# Patient Record
Sex: FEMALE | Race: WHITE | Employment: OTHER | URBAN - METROPOLITAN AREA
[De-identification: names, ages, dates, MRNs, and addresses within clinical notes are randomized per-mention and may not be internally consistent; named-entity substitution may affect disease eponyms.]

---

## 2022-10-04 ENCOUNTER — APPOINTMENT (OUTPATIENT)
Dept: ULTRASOUND IMAGING | Age: 87
DRG: 418 | End: 2022-10-04
Payer: MEDICARE

## 2022-10-04 ENCOUNTER — HOSPITAL ENCOUNTER (INPATIENT)
Age: 87
LOS: 8 days | Discharge: NURSING, REHAB OR LONG-TERM CARE | DRG: 418 | End: 2022-10-12
Attending: INTERNAL MEDICINE | Admitting: INTERNAL MEDICINE
Payer: MEDICARE

## 2022-10-04 DIAGNOSIS — R74.01 TRANSAMINITIS: ICD-10-CM

## 2022-10-04 DIAGNOSIS — K81.0 ACUTE CHOLECYSTITIS: Primary | ICD-10-CM

## 2022-10-04 LAB
A/G RATIO: 1.3 (ref 1.1–2.2)
ALBUMIN SERPL-MCNC: 4 G/DL (ref 3.4–5)
ALP BLD-CCNC: 369 U/L (ref 40–129)
ALT SERPL-CCNC: 892 U/L (ref 10–40)
ANION GAP SERPL CALCULATED.3IONS-SCNC: 11 MMOL/L (ref 3–16)
APTT: 35.3 SEC (ref 23–34.3)
AST SERPL-CCNC: 462 U/L (ref 15–37)
BASOPHILS ABSOLUTE: 0 K/UL (ref 0–0.2)
BASOPHILS RELATIVE PERCENT: 0.4 %
BILIRUB SERPL-MCNC: 5.8 MG/DL (ref 0–1)
BUN BLDV-MCNC: 12 MG/DL (ref 7–20)
CALCIUM SERPL-MCNC: 10 MG/DL (ref 8.3–10.6)
CHLORIDE BLD-SCNC: 97 MMOL/L (ref 99–110)
CO2: 25 MMOL/L (ref 21–32)
CREAT SERPL-MCNC: <0.5 MG/DL (ref 0.6–1.2)
EOSINOPHILS ABSOLUTE: 0 K/UL (ref 0–0.6)
EOSINOPHILS RELATIVE PERCENT: 0.1 %
GFR AFRICAN AMERICAN: >60
GFR NON-AFRICAN AMERICAN: >60
GLUCOSE BLD-MCNC: 161 MG/DL (ref 70–99)
HCT VFR BLD CALC: 47.2 % (ref 36–48)
HEMOGLOBIN: 15.4 G/DL (ref 12–16)
INR BLD: 1.59 (ref 0.87–1.14)
LIPASE: 38 U/L (ref 13–60)
LYMPHOCYTES ABSOLUTE: 0.7 K/UL (ref 1–5.1)
LYMPHOCYTES RELATIVE PERCENT: 6.3 %
MCH RBC QN AUTO: 28.3 PG (ref 26–34)
MCHC RBC AUTO-ENTMCNC: 32.6 G/DL (ref 31–36)
MCV RBC AUTO: 86.8 FL (ref 80–100)
MONOCYTES ABSOLUTE: 0.8 K/UL (ref 0–1.3)
MONOCYTES RELATIVE PERCENT: 7.4 %
NEUTROPHILS ABSOLUTE: 9.8 K/UL (ref 1.7–7.7)
NEUTROPHILS RELATIVE PERCENT: 85.8 %
PDW BLD-RTO: 14.4 % (ref 12.4–15.4)
PLATELET # BLD: 224 K/UL (ref 135–450)
PMV BLD AUTO: 8.5 FL (ref 5–10.5)
POTASSIUM SERPL-SCNC: 4.5 MMOL/L (ref 3.5–5.1)
PROTHROMBIN TIME: 18.9 SEC (ref 11.7–14.5)
RBC # BLD: 5.44 M/UL (ref 4–5.2)
SODIUM BLD-SCNC: 133 MMOL/L (ref 136–145)
TOTAL PROTEIN: 7.2 G/DL (ref 6.4–8.2)
WBC # BLD: 11.4 K/UL (ref 4–11)

## 2022-10-04 PROCEDURE — 76705 ECHO EXAM OF ABDOMEN: CPT

## 2022-10-04 PROCEDURE — 93005 ELECTROCARDIOGRAM TRACING: CPT | Performed by: PHYSICIAN ASSISTANT

## 2022-10-04 PROCEDURE — 1200000000 HC SEMI PRIVATE

## 2022-10-04 PROCEDURE — 85730 THROMBOPLASTIN TIME PARTIAL: CPT

## 2022-10-04 PROCEDURE — 85610 PROTHROMBIN TIME: CPT

## 2022-10-04 PROCEDURE — 99285 EMERGENCY DEPT VISIT HI MDM: CPT

## 2022-10-04 PROCEDURE — 83690 ASSAY OF LIPASE: CPT

## 2022-10-04 PROCEDURE — 80053 COMPREHEN METABOLIC PANEL: CPT

## 2022-10-04 PROCEDURE — 85025 COMPLETE CBC W/AUTO DIFF WBC: CPT

## 2022-10-04 RX ORDER — 0.9 % SODIUM CHLORIDE 0.9 %
1000 INTRAVENOUS SOLUTION INTRAVENOUS ONCE
Status: COMPLETED | OUTPATIENT
Start: 2022-10-04 | End: 2022-10-05

## 2022-10-04 RX ORDER — AMLODIPINE BESYLATE 5 MG/1
10 TABLET ORAL DAILY
Status: CANCELLED | OUTPATIENT
Start: 2022-10-05

## 2022-10-04 RX ORDER — LISINOPRIL 5 MG/1
5 TABLET ORAL DAILY
COMMUNITY
End: 2022-10-05 | Stop reason: ALTCHOICE

## 2022-10-04 RX ORDER — LISINOPRIL 10 MG/1
5 TABLET ORAL DAILY
Status: CANCELLED | OUTPATIENT
Start: 2022-10-05

## 2022-10-04 RX ORDER — MORPHINE SULFATE 4 MG/ML
4 INJECTION, SOLUTION INTRAMUSCULAR; INTRAVENOUS EVERY 30 MIN PRN
Status: DISCONTINUED | OUTPATIENT
Start: 2022-10-04 | End: 2022-10-05

## 2022-10-04 RX ORDER — ONDANSETRON 2 MG/ML
4 INJECTION INTRAMUSCULAR; INTRAVENOUS EVERY 6 HOURS PRN
Status: DISCONTINUED | OUTPATIENT
Start: 2022-10-04 | End: 2022-10-05

## 2022-10-04 RX ORDER — AMLODIPINE BESYLATE 10 MG/1
10 TABLET ORAL DAILY
COMMUNITY
End: 2022-10-05 | Stop reason: ALTCHOICE

## 2022-10-04 ASSESSMENT — PAIN SCALES - GENERAL: PAINLEVEL_OUTOF10: 8

## 2022-10-04 ASSESSMENT — ENCOUNTER SYMPTOMS
ABDOMINAL DISTENTION: 1
SHORTNESS OF BREATH: 0
VOMITING: 0
COUGH: 0
NAUSEA: 1
DIARRHEA: 0
BACK PAIN: 1
RHINORRHEA: 0
ABDOMINAL PAIN: 0

## 2022-10-04 ASSESSMENT — PAIN - FUNCTIONAL ASSESSMENT: PAIN_FUNCTIONAL_ASSESSMENT: 0-10

## 2022-10-05 ENCOUNTER — APPOINTMENT (OUTPATIENT)
Dept: CT IMAGING | Age: 87
DRG: 418 | End: 2022-10-05
Payer: MEDICARE

## 2022-10-05 ENCOUNTER — APPOINTMENT (OUTPATIENT)
Dept: MRI IMAGING | Age: 87
DRG: 418 | End: 2022-10-05
Payer: MEDICARE

## 2022-10-05 LAB
ALBUMIN SERPL-MCNC: 3.2 G/DL (ref 3.4–5)
ALP BLD-CCNC: 315 U/L (ref 40–129)
ALT SERPL-CCNC: 642 U/L (ref 10–40)
ANION GAP SERPL CALCULATED.3IONS-SCNC: 12 MMOL/L (ref 3–16)
APTT: 34.8 SEC (ref 23–34.3)
AST SERPL-CCNC: 299 U/L (ref 15–37)
BACTERIA: ABNORMAL /HPF
BASOPHILS ABSOLUTE: 0 K/UL (ref 0–0.2)
BASOPHILS RELATIVE PERCENT: 0.4 %
BILIRUB SERPL-MCNC: 4.8 MG/DL (ref 0–1)
BILIRUBIN DIRECT: 4.2 MG/DL (ref 0–0.3)
BILIRUBIN URINE: NEGATIVE
BILIRUBIN, INDIRECT: 0.6 MG/DL (ref 0–1)
BLOOD, URINE: NEGATIVE
BUN BLDV-MCNC: 9 MG/DL (ref 7–20)
CALCIUM SERPL-MCNC: 8.9 MG/DL (ref 8.3–10.6)
CHLORIDE BLD-SCNC: 107 MMOL/L (ref 99–110)
CLARITY: CLEAR
CO2: 22 MMOL/L (ref 21–32)
COLOR: YELLOW
CREAT SERPL-MCNC: <0.5 MG/DL (ref 0.6–1.2)
EOSINOPHILS ABSOLUTE: 0.1 K/UL (ref 0–0.6)
EOSINOPHILS RELATIVE PERCENT: 0.9 %
EPITHELIAL CELLS, UA: ABNORMAL /HPF (ref 0–5)
GFR AFRICAN AMERICAN: >60
GFR NON-AFRICAN AMERICAN: >60
GLUCOSE BLD-MCNC: 102 MG/DL (ref 70–99)
GLUCOSE URINE: NEGATIVE MG/DL
HCT VFR BLD CALC: 40.8 % (ref 36–48)
HEMOGLOBIN: 13.6 G/DL (ref 12–16)
INR BLD: 1.57 (ref 0.87–1.14)
KETONES, URINE: NEGATIVE MG/DL
LACTIC ACID: 1.1 MMOL/L (ref 0.4–2)
LEUKOCYTE ESTERASE, URINE: ABNORMAL
LIPASE: 51 U/L (ref 13–60)
LYMPHOCYTES ABSOLUTE: 1.2 K/UL (ref 1–5.1)
LYMPHOCYTES RELATIVE PERCENT: 16.4 %
MAGNESIUM: 1.8 MG/DL (ref 1.8–2.4)
MCH RBC QN AUTO: 29.1 PG (ref 26–34)
MCHC RBC AUTO-ENTMCNC: 33.4 G/DL (ref 31–36)
MCV RBC AUTO: 86.9 FL (ref 80–100)
MICROSCOPIC EXAMINATION: YES
MONOCYTES ABSOLUTE: 0.8 K/UL (ref 0–1.3)
MONOCYTES RELATIVE PERCENT: 11 %
NEUTROPHILS ABSOLUTE: 5.2 K/UL (ref 1.7–7.7)
NEUTROPHILS RELATIVE PERCENT: 71.3 %
NITRITE, URINE: NEGATIVE
PDW BLD-RTO: 14.2 % (ref 12.4–15.4)
PH UA: 6.5 (ref 5–8)
PHOSPHORUS: 2.9 MG/DL (ref 2.5–4.9)
PLATELET # BLD: 191 K/UL (ref 135–450)
PMV BLD AUTO: 8.9 FL (ref 5–10.5)
POTASSIUM SERPL-SCNC: 3.7 MMOL/L (ref 3.5–5.1)
PREALBUMIN: 12.5 MG/DL (ref 20–40)
PRO-BNP: 933 PG/ML (ref 0–449)
PROTEIN UA: NEGATIVE MG/DL
PROTHROMBIN TIME: 18.7 SEC (ref 11.7–14.5)
RBC # BLD: 4.69 M/UL (ref 4–5.2)
RBC UA: ABNORMAL /HPF (ref 0–4)
SODIUM BLD-SCNC: 141 MMOL/L (ref 136–145)
SPECIFIC GRAVITY UA: >=1.03 (ref 1–1.03)
TOTAL PROTEIN: 6 G/DL (ref 6.4–8.2)
URINE REFLEX TO CULTURE: ABNORMAL
URINE TYPE: ABNORMAL
UROBILINOGEN, URINE: 1 E.U./DL
WBC # BLD: 7.2 K/UL (ref 4–11)
WBC UA: ABNORMAL /HPF (ref 0–5)

## 2022-10-05 PROCEDURE — 6370000000 HC RX 637 (ALT 250 FOR IP): Performed by: PHYSICIAN ASSISTANT

## 2022-10-05 PROCEDURE — 85025 COMPLETE CBC W/AUTO DIFF WBC: CPT

## 2022-10-05 PROCEDURE — 83690 ASSAY OF LIPASE: CPT

## 2022-10-05 PROCEDURE — 2580000003 HC RX 258: Performed by: PHYSICIAN ASSISTANT

## 2022-10-05 PROCEDURE — 80048 BASIC METABOLIC PNL TOTAL CA: CPT

## 2022-10-05 PROCEDURE — 6360000004 HC RX CONTRAST MEDICATION: Performed by: PHYSICIAN ASSISTANT

## 2022-10-05 PROCEDURE — 80076 HEPATIC FUNCTION PANEL: CPT

## 2022-10-05 PROCEDURE — A9577 INJ MULTIHANCE: HCPCS | Performed by: SURGERY

## 2022-10-05 PROCEDURE — 84466 ASSAY OF TRANSFERRIN: CPT

## 2022-10-05 PROCEDURE — 2580000003 HC RX 258: Performed by: SURGERY

## 2022-10-05 PROCEDURE — APPNB30 APP NON BILLABLE TIME 0-30 MINS: Performed by: NURSE PRACTITIONER

## 2022-10-05 PROCEDURE — 85610 PROTHROMBIN TIME: CPT

## 2022-10-05 PROCEDURE — 81001 URINALYSIS AUTO W/SCOPE: CPT

## 2022-10-05 PROCEDURE — 85730 THROMBOPLASTIN TIME PARTIAL: CPT

## 2022-10-05 PROCEDURE — 36415 COLL VENOUS BLD VENIPUNCTURE: CPT

## 2022-10-05 PROCEDURE — 83605 ASSAY OF LACTIC ACID: CPT

## 2022-10-05 PROCEDURE — 83735 ASSAY OF MAGNESIUM: CPT

## 2022-10-05 PROCEDURE — 74183 MRI ABD W/O CNTR FLWD CNTR: CPT

## 2022-10-05 PROCEDURE — 99222 1ST HOSP IP/OBS MODERATE 55: CPT | Performed by: SURGERY

## 2022-10-05 PROCEDURE — 6360000002 HC RX W HCPCS: Performed by: PHYSICIAN ASSISTANT

## 2022-10-05 PROCEDURE — 84134 ASSAY OF PREALBUMIN: CPT

## 2022-10-05 PROCEDURE — 1200000000 HC SEMI PRIVATE

## 2022-10-05 PROCEDURE — 6360000002 HC RX W HCPCS: Performed by: SURGERY

## 2022-10-05 PROCEDURE — 74177 CT ABD & PELVIS W/CONTRAST: CPT

## 2022-10-05 PROCEDURE — 84100 ASSAY OF PHOSPHORUS: CPT

## 2022-10-05 PROCEDURE — 6360000004 HC RX CONTRAST MEDICATION: Performed by: SURGERY

## 2022-10-05 PROCEDURE — 83880 ASSAY OF NATRIURETIC PEPTIDE: CPT

## 2022-10-05 RX ORDER — METOPROLOL SUCCINATE 25 MG/1
1 TABLET, EXTENDED RELEASE ORAL DAILY
COMMUNITY
Start: 2022-07-16

## 2022-10-05 RX ORDER — SODIUM CHLORIDE 9 MG/ML
INJECTION, SOLUTION INTRAVENOUS CONTINUOUS
Status: DISCONTINUED | OUTPATIENT
Start: 2022-10-05 | End: 2022-10-08

## 2022-10-05 RX ORDER — SENNA PLUS 8.6 MG/1
1 TABLET ORAL DAILY PRN
Status: DISCONTINUED | OUTPATIENT
Start: 2022-10-05 | End: 2022-10-12 | Stop reason: HOSPADM

## 2022-10-05 RX ORDER — ONDANSETRON 2 MG/ML
4 INJECTION INTRAMUSCULAR; INTRAVENOUS EVERY 6 HOURS PRN
Status: DISCONTINUED | OUTPATIENT
Start: 2022-10-05 | End: 2022-10-12 | Stop reason: HOSPADM

## 2022-10-05 RX ORDER — SODIUM CHLORIDE 0.9 % (FLUSH) 0.9 %
10 SYRINGE (ML) INJECTION EVERY 12 HOURS SCHEDULED
Status: DISCONTINUED | OUTPATIENT
Start: 2022-10-05 | End: 2022-10-12 | Stop reason: HOSPADM

## 2022-10-05 RX ORDER — METOPROLOL SUCCINATE 25 MG/1
25 TABLET, EXTENDED RELEASE ORAL DAILY
Status: DISCONTINUED | OUTPATIENT
Start: 2022-10-05 | End: 2022-10-12 | Stop reason: HOSPADM

## 2022-10-05 RX ORDER — SODIUM CHLORIDE 9 MG/ML
INJECTION, SOLUTION INTRAVENOUS PRN
Status: DISCONTINUED | OUTPATIENT
Start: 2022-10-05 | End: 2022-10-12 | Stop reason: HOSPADM

## 2022-10-05 RX ORDER — LISINOPRIL 20 MG/1
20 TABLET ORAL DAILY
Status: DISCONTINUED | OUTPATIENT
Start: 2022-10-05 | End: 2022-10-09

## 2022-10-05 RX ORDER — MORPHINE SULFATE 4 MG/ML
4 INJECTION, SOLUTION INTRAMUSCULAR; INTRAVENOUS
Status: DISCONTINUED | OUTPATIENT
Start: 2022-10-05 | End: 2022-10-12 | Stop reason: HOSPADM

## 2022-10-05 RX ORDER — HYDRALAZINE HYDROCHLORIDE 20 MG/ML
10 INJECTION INTRAMUSCULAR; INTRAVENOUS ONCE
Status: COMPLETED | OUTPATIENT
Start: 2022-10-05 | End: 2022-10-05

## 2022-10-05 RX ORDER — ATORVASTATIN CALCIUM 10 MG/1
10 TABLET, FILM COATED ORAL DAILY
Status: DISCONTINUED | OUTPATIENT
Start: 2022-10-05 | End: 2022-10-12 | Stop reason: HOSPADM

## 2022-10-05 RX ORDER — AMLODIPINE BESYLATE 5 MG/1
1 TABLET ORAL DAILY
Status: ON HOLD | COMMUNITY
Start: 2022-08-18 | End: 2022-10-12 | Stop reason: HOSPADM

## 2022-10-05 RX ORDER — AMLODIPINE BESYLATE 5 MG/1
5 TABLET ORAL DAILY
Status: DISCONTINUED | OUTPATIENT
Start: 2022-10-05 | End: 2022-10-06

## 2022-10-05 RX ORDER — ATORVASTATIN CALCIUM 10 MG/1
1 TABLET, FILM COATED ORAL NIGHTLY
COMMUNITY
Start: 2022-08-12

## 2022-10-05 RX ORDER — SODIUM CHLORIDE 0.9 % (FLUSH) 0.9 %
10 SYRINGE (ML) INJECTION PRN
Status: DISCONTINUED | OUTPATIENT
Start: 2022-10-05 | End: 2022-10-12 | Stop reason: HOSPADM

## 2022-10-05 RX ORDER — ACETAMINOPHEN 650 MG/1
650 SUPPOSITORY RECTAL EVERY 6 HOURS PRN
Status: DISCONTINUED | OUTPATIENT
Start: 2022-10-05 | End: 2022-10-12 | Stop reason: HOSPADM

## 2022-10-05 RX ORDER — MORPHINE SULFATE 2 MG/ML
2 INJECTION, SOLUTION INTRAMUSCULAR; INTRAVENOUS
Status: DISCONTINUED | OUTPATIENT
Start: 2022-10-05 | End: 2022-10-12 | Stop reason: HOSPADM

## 2022-10-05 RX ORDER — ACETAMINOPHEN 325 MG/1
650 TABLET ORAL EVERY 6 HOURS PRN
Status: DISCONTINUED | OUTPATIENT
Start: 2022-10-05 | End: 2022-10-12 | Stop reason: HOSPADM

## 2022-10-05 RX ORDER — LACTOBACILLUS RHAMNOSUS GG 10B CELL
1 CAPSULE ORAL 2 TIMES DAILY WITH MEALS
Status: DISCONTINUED | OUTPATIENT
Start: 2022-10-05 | End: 2022-10-12 | Stop reason: HOSPADM

## 2022-10-05 RX ORDER — LISINOPRIL 20 MG/1
1 TABLET ORAL DAILY
Status: ON HOLD | COMMUNITY
Start: 2022-07-31 | End: 2022-10-12 | Stop reason: HOSPADM

## 2022-10-05 RX ADMIN — Medication 10 ML: at 09:44

## 2022-10-05 RX ADMIN — GADOBENATE DIMEGLUMINE 15 ML: 529 INJECTION, SOLUTION INTRAVENOUS at 12:25

## 2022-10-05 RX ADMIN — PIPERACILLIN AND TAZOBACTAM 3375 MG: 3; .375 INJECTION, POWDER, FOR SOLUTION INTRAVENOUS at 05:53

## 2022-10-05 RX ADMIN — Medication 1 CAPSULE: at 17:33

## 2022-10-05 RX ADMIN — SODIUM CHLORIDE 1000 ML: 9 INJECTION, SOLUTION INTRAVENOUS at 00:21

## 2022-10-05 RX ADMIN — PIPERACILLIN AND TAZOBACTAM 3375 MG: 3; .375 INJECTION, POWDER, FOR SOLUTION INTRAVENOUS at 00:22

## 2022-10-05 RX ADMIN — SODIUM CHLORIDE, PRESERVATIVE FREE 10 ML: 5 INJECTION INTRAVENOUS at 01:45

## 2022-10-05 RX ADMIN — IOPAMIDOL 75 ML: 755 INJECTION, SOLUTION INTRAVENOUS at 00:12

## 2022-10-05 RX ADMIN — HYDRALAZINE HYDROCHLORIDE 10 MG: 20 INJECTION INTRAMUSCULAR; INTRAVENOUS at 01:44

## 2022-10-05 RX ADMIN — LISINOPRIL 20 MG: 20 TABLET ORAL at 09:40

## 2022-10-05 RX ADMIN — SODIUM CHLORIDE: 9 INJECTION, SOLUTION INTRAVENOUS at 01:41

## 2022-10-05 RX ADMIN — PIPERACILLIN AND TAZOBACTAM 3375 MG: 3; .375 INJECTION, POWDER, FOR SOLUTION INTRAVENOUS at 23:42

## 2022-10-05 RX ADMIN — METOPROLOL SUCCINATE 25 MG: 25 TABLET, EXTENDED RELEASE ORAL at 09:40

## 2022-10-05 RX ADMIN — SODIUM CHLORIDE: 9 INJECTION, SOLUTION INTRAVENOUS at 16:17

## 2022-10-05 RX ADMIN — MORPHINE SULFATE 2 MG: 2 INJECTION, SOLUTION INTRAMUSCULAR; INTRAVENOUS at 01:45

## 2022-10-05 RX ADMIN — ATORVASTATIN CALCIUM 10 MG: 10 TABLET, FILM COATED ORAL at 09:41

## 2022-10-05 RX ADMIN — AMLODIPINE BESYLATE 5 MG: 5 TABLET ORAL at 09:41

## 2022-10-05 RX ADMIN — PIPERACILLIN AND TAZOBACTAM 3375 MG: 3; .375 INJECTION, POWDER, FOR SOLUTION INTRAVENOUS at 16:19

## 2022-10-05 ASSESSMENT — PAIN SCALES - GENERAL
PAINLEVEL_OUTOF10: 0
PAINLEVEL_OUTOF10: 0
PAINLEVEL_OUTOF10: 5
PAINLEVEL_OUTOF10: 0

## 2022-10-05 ASSESSMENT — PAIN - FUNCTIONAL ASSESSMENT
PAIN_FUNCTIONAL_ASSESSMENT_SITE2: ACTIVITIES ARE NOT PREVENTED
PAIN_FUNCTIONAL_ASSESSMENT: PREVENTS OR INTERFERES SOME ACTIVE ACTIVITIES AND ADLS

## 2022-10-05 ASSESSMENT — PAIN DESCRIPTION - LOCATION
LOCATION: BACK
LOCATION_2: HEAD

## 2022-10-05 ASSESSMENT — PAIN DESCRIPTION - FREQUENCY: FREQUENCY: CONTINUOUS

## 2022-10-05 ASSESSMENT — PAIN DESCRIPTION - INTENSITY: RATING_2: 3

## 2022-10-05 ASSESSMENT — PAIN DESCRIPTION - DESCRIPTORS
DESCRIPTORS_2: ACHING
DESCRIPTORS: ACHING

## 2022-10-05 ASSESSMENT — PAIN DESCRIPTION - ORIENTATION: ORIENTATION: UPPER

## 2022-10-05 ASSESSMENT — PAIN DESCRIPTION - PAIN TYPE: TYPE: ACUTE PAIN

## 2022-10-05 ASSESSMENT — PAIN DESCRIPTION - ONSET: ONSET: ON-GOING

## 2022-10-05 NOTE — PLAN OF CARE
Problem: Discharge Planning  Goal: Discharge to home or other facility with appropriate resources  Outcome: Progressing  Flowsheets (Taken 10/5/2022 0153)  Discharge to home or other facility with appropriate resources: Identify barriers to discharge with patient and caregiver     Problem: Pain  Goal: Verbalizes/displays adequate comfort level or baseline comfort level  Outcome: Progressing     Problem: Safety - Adult  Goal: Free from fall injury  Outcome: Progressing     Problem: ABCDS Injury Assessment  Goal: Absence of physical injury  Outcome: Progressing

## 2022-10-05 NOTE — PROGRESS NOTES
Pt admitted to room 3328 from ED. VSS. Pt A&Ox3, disoriented to time but easily reoriented. POC updated with pt, all questions answered. Oriented pt to room and call light. Call light and bedside table within reach. Instructed to call out with any needs, v/u. Will monitor.

## 2022-10-05 NOTE — PROGRESS NOTES
Hospitalist Progress Note      PCP: No primary care provider on file. Date of Admission: 10/4/2022    Chief Complaint: Abdominal pain    Hospital Course: Presented with abdominal pain. Concerns noted for cholelithiasis and cholecystitis. GI and general surgery consulted. MRCP planned. Patient's abdominal pain is controlled today. Subjective: No chest pain, no shortness of breath, no nausea, no vomiting. Abdominal pain was severe on arrival, currently does not have any abdominal pain for      Medications:  Reviewed    Infusion Medications    sodium chloride      sodium chloride 75 mL/hr at 10/05/22 0141     Scheduled Medications    [Held by provider] apixaban  5 mg Oral BID    sodium chloride flush  10 mL IntraVENous 2 times per day    lactobacillus  1 capsule Oral BID WC    metoprolol succinate  25 mg Oral Daily    lisinopril  20 mg Oral Daily    atorvastatin  10 mg Oral Daily    amLODIPine  5 mg Oral Daily    piperacillin-tazobactam  3,375 mg IntraVENous Q8H     PRN Meds: sodium chloride flush, sodium chloride, acetaminophen **OR** acetaminophen, senna, ondansetron, morphine **OR** morphine      Intake/Output Summary (Last 24 hours) at 10/5/2022 1148  Last data filed at 10/5/2022 0144  Gross per 24 hour   Intake 10 ml   Output --   Net 10 ml       Physical Exam Performed:    BP (!) 179/74   Pulse 86   Temp 97.5 °F (36.4 °C) (Oral)   Resp 16   Ht 5' 7\" (1.702 m)   Wt 148 lb 2.4 oz (67.2 kg)   SpO2 92%   BMI 23.20 kg/m²     General appearance: No apparent distress, appears stated age and cooperative. HEENT: Pupils equal, round, and reactive to light. Conjunctivae/corneas clear. Neck: Supple, with full range of motion. No jugular venous distention. Trachea midline. Respiratory:  Normal respiratory effort. Clear to auscultation, bilaterally without Rales/Wheezes/Rhonchi. Cardiovascular: Regular rate and rhythm with normal S1/S2 without murmurs, rubs or gallops.   Abdomen: Soft, non-tender, non-distended with normal bowel sounds. Musculoskeletal: No clubbing, cyanosis or edema bilaterally. Full range of motion without deformity. Skin: Skin color, texture, turgor normal.  No rashes or lesions. Neurologic:  Neurovascularly intact without any focal sensory/motor deficits. Cranial nerves: II-XII intact, grossly non-focal.  Psychiatric: Alert and oriented, thought content appropriate, normal insight  Capillary Refill: Brisk, 3 seconds, normal   Peripheral Pulses: +2 palpable, equal bilaterally       Labs:   Recent Labs     10/04/22  2009 10/05/22  0607   WBC 11.4* 7.2   HGB 15.4 13.6   HCT 47.2 40.8    191     Recent Labs     10/04/22  2009 10/05/22  0607   * 141   K 4.5 3.7   CL 97* 107   CO2 25 22   BUN 12 9   CREATININE <0.5* <0.5*   CALCIUM 10.0 8.9   PHOS  --  2.9     Recent Labs     10/04/22  2009 10/05/22  0607   * 299*   * 642*   BILIDIR  --  4.2*   BILITOT 5.8* 4.8*   ALKPHOS 369* 315*     Recent Labs     10/04/22  2009 10/05/22  0607   INR 1.59* 1.57*     No results for input(s): Melinda Redmond in the last 72 hours. Urinalysis:      Lab Results   Component Value Date/Time    NITRU Negative 10/05/2022 02:00 AM    WBCUA 6-9 10/05/2022 02:00 AM    BACTERIA 1+ 10/05/2022 02:00 AM    RBCUA 0-2 10/05/2022 02:00 AM    BLOODU Negative 10/05/2022 02:00 AM    SPECGRAV >=1.030 10/05/2022 02:00 AM    GLUCOSEU Negative 10/05/2022 02:00 AM       Radiology:  CT ABDOMEN PELVIS W IV CONTRAST Additional Contrast? None   Final Result   Cholelithiasis. Gallbladder wall thickening, pericholecystic fat stranding   and trace fluid suggest acute cholecystitis. Mild to moderate common bile duct dilatation extending to the ampulla. Choledocholithiasis must be considered. Follow-up MRCP would be helpful in   further evaluation. Sigmoid colon diverticulosis. No evidence of diverticulitis. 2.5 cm left ovarian cyst.  Follow-up recommendation as below. RECOMMENDATIONS:   Managing Incidental Adnexal Cystic Mass by CT or MR      Late postmenopausal      < or equal to 3 cm: no follow up      >3 cm: US promptly      Reference:      [place] al. Managing Incidental Findings on Abdominal CT: White Paper of   the ACR Incidental Findings Committee. J Am Lo Radiol 2010;7:754-773         US GALLBLADDER RUQ   Final Result   1. Cholelithiasis is identified, with minimal pericholecystic fluid. There   is also a focal area of masslike thickening and increased echogenicity as   well as posterior q.6 shadowing in a non dependent location involving the   fundus of the gallbladder. A gallbladder mass cannot be excluded. Recommend   further evaluation with dedicated CT imaging of the abdomen and pelvis with   contrast.   2. Dilation of the common bile duct. This can be assessed on CT imaging as   well. 3. There are cysts associated with the right kidney, as well as questionable   hydronephrosis. This can be assessed on CT imaging. MRI ABDOMEN W WO CONTRAST MRCP    (Results Pending)           Assessment/Plan:    Active Hospital Problems    Diagnosis     Acute cholecystitis [K81.0]      Priority: Medium     PLAN:    Acute cholecystitis  CT scan of the abdomen showed CBD dilatation. Elevated transaminases  GI and general surgery were consulted  MRCP ordered    Paroxysmal atrial fibrillation  Heart rate is controlled. Continue metoprolol. Eliquis on hold in anticipation of the procedure    Chronic diastolic congestive heart failure  Clinically euvolemic patient's with no acute symptoms. Continue same management. Hypertension  Controlled blood pressure with current management. No need to make any changes. Discussed with patient and family. Questions answered.     DVT Prophylaxis: Eliquis on hold  Diet: Diet NPO Exceptions are: Sips of Water with Meds  Code Status: Full Code  PT/OT Eval Status: Consider when plans are clarified    Dispo -inpatient stay pending GI and general surgery recommendation      Margareth Mortensen MD

## 2022-10-05 NOTE — CARE COORDINATION
SW reviewed pt's chart for discharge needs. Patient is a low risk for readmission at 11%. Patient is active with [facility] for PCP follow up and has active insurance. No needs have been identified at this time. SW will continue to follow.     Electronically signed by ROSE Cooper LSW on 10/5/2022 at 5:04 PM

## 2022-10-05 NOTE — ED NOTES
Patient transported to 3A by ED tech in stable condition with NS infusing at this time.       Jorje Holstein, RN  10/05/22 5256

## 2022-10-05 NOTE — H&P
[facility] Medicine History & Physical      Patient Name: Mellisa Lu    : 3/16/1931    PCP: No primary care provider on file. Date of Service:  Patient seen and examined on 10/4/2022     Chief Complaint:  Back pain, abdominal pain    History Of Present Illness:    Mellisa Lu is a 80 y.o. female who presented to ED for evaluation of intermittent, sharp, upper abdominal pain radiating to her back which has been ongoing for the last 3 days and has waxed and waned in severity. Patient was seen by PCP today. Labs, CT chest, and EKG performed. Chest CT concerning for edema around gallbladder, and patient was sent to ED for further evaluation and management. She reports assocated nausea and decreased appetite. She denies vomiting, diarrhea, constipation. She denies fever, chest pain, shortness of breath, cough, urinary symptoms. Past Medical History:    Patient has a past medical history of Atrial fibrillation ([address] and Hypertension. Past Surgical History:    Patient denies any significant past surgical history    Medications Prior to Admission:      Prior to Admission medications    Medication Sig Start Date End Date Taking? Authorizing Provider   Apixaban (ELIQUIS PO) Take 5 mg by mouth in the morning and at bedtime   Yes Historical Provider, MD   lisinopril (PRINIVIL;ZESTRIL) 5 MG tablet Take 5 mg by mouth daily   Yes Historical Provider, MD   amLODIPine (NORVASC) 10 MG tablet Take 10 mg by mouth daily   Yes Historical Provider, MD       Allergies:  Patient has no known allergies. Social History:      TOBACCO:   reports that she has never smoked. She does not have any smokeless tobacco history on file. ETOH:   reports that she does not currently use alcohol. DRUGS:  reports no history of drug use. Family History:      Reviewed in detail positive as follows:    History reviewed. No pertinent family history. REVIEW OF SYSTEMS:   Pertinent positives as noted in the HPI.  All other systems reviewed and negative. PHYSICAL EXAM PERFORMED:    BP (!) 183/70   Pulse 60   Temp 98.4 °F (36.9 °C) (Oral)   Resp 18   SpO2 96%     General appearance:  Awake, alert, no apparent distress  HEENT:  Normocephalic, atraumatic without obvious deformity. PERRL. EOM intact. Conjunctivae/corneas clear. Neck: Supple, with full range of motion. No JVD. Trachea midline. Respiratory:  Clear to auscultation bilaterally without rales, wheezes, or rhonchi. Normal respiratory effort. Cardiovascular:  Irregularly irregular rhythm. Normal rate. No murmurs, rubs, or gallops  Abdomen: RUQ, epigastric tenderness. Soft, ND, without rebound or guarding. Normal bowel sounds. Back: No reproducible back tenderness  Extremities:  No clubbing, cyanosis, or edema bilaterally. Full range of motion without deformity. +2 palpable pulses, equal bilaterally. Capillary refill brisk,< 3 seconds   Skin: No rashes or lesions. Warm/dry. Neurologic:  Neurovascularly intact without any focal sensory/motor deficits. Cranial nerves: II-XII intact, grossly non-focal. Alert and oriented x 3. Normal speech. Psychiatric:  Thought content appropriate, normal insight. Labs:   CBC   Recent Labs     10/04/22  2009   WBC 11.4*   HGB 15.4   HCT 47.2           RENAL  Recent Labs     10/04/22  2009   *   K 4.5   CL 97*   CO2 25   BUN 12   CREATININE <0.5*       LFTS  Recent Labs     10/04/22  2009   *   *   BILITOT 5.8*   ALKPHOS 369*       COAG  Recent Labs     10/04/22  2009   INR 1.59*       CARDIAC ENZYMES  No results for input(s): TROPONINI in the last 72 hours. LIPIDS  No results found for: CHOL, TRIG, HDL, LDLCALC      Radiology:     CT ABDOMEN PELVIS W IV CONTRAST Additional Contrast? None   Final Result   Cholelithiasis. Gallbladder wall thickening, pericholecystic fat stranding   and trace fluid suggest acute cholecystitis.       Mild to moderate common bile duct dilatation extending to the ampulla. Choledocholithiasis must be considered. Follow-up MRCP would be helpful in   further evaluation. Sigmoid colon diverticulosis. No evidence of diverticulitis. 2.5 cm left ovarian cyst.  Follow-up recommendation as below. RECOMMENDATIONS:   Managing Incidental Adnexal Cystic Mass by CT or MR      Late postmenopausal      < or equal to 3 cm: no follow up      >3 cm: US promptly      Reference:      Zeus persaud. Managing Incidental Findings on Abdominal CT: White Paper of   the ACR Incidental Findings Committee. J Am Lo Radiol 2010;7:754-773         US GALLBLADDER RUQ   Final Result   1. Cholelithiasis is identified, with minimal pericholecystic fluid. There   is also a focal area of masslike thickening and increased echogenicity as   well as posterior q.6 shadowing in a non dependent location involving the   fundus of the gallbladder. A gallbladder mass cannot be excluded. Recommend   further evaluation with dedicated CT imaging of the abdomen and pelvis with   contrast.   2. Dilation of the common bile duct. This can be assessed on CT imaging as   well. 3. There are cysts associated with the right kidney, as well as questionable   hydronephrosis. This can be assessed on CT imaging. ASSESSMENT/PLAN:    Acute cholecystitis/Dilation of CBD/Transaminitis  Concern for choledocholithiasis  Zosyn initiated in ED.  Will continue  GI and general surgery consulted in ED    Paroxysmal A fib  Continue home metoprolol and eliquis     HTN  Continue home norvasc, metoprolol and lisinopril     Chronic diastolic CHF  Appears compensated  Last ECHO on 8/80/88 noted \"systolic function was normal\"  Check ProBNP   Continue home BB, ACEI  Daily weights, I/Os     Atheroma of aorta, large  Continue home statin      DVT prophylaxis: Eliquis  Probiotic if on abx: Yes    Diet: Diet NPO Exceptions are: Sips of Water with Meds  Code Status: Full Code    Consults:  IP CONSULT TO GENERAL SURGERY  IP CONSULT TO GI    Disposition: Admit to Inpatient   ELOS: Greater than two midnights due to medical therapy     Jarad Dow PA-C    Thank you for the opportunity to be involved in this patient's care. If you have any questions or concerns please feel free to contact me at [phone].

## 2022-10-05 NOTE — CONSULTS
Gastroenterology Consult Note        Patient: Cori Bonilla  : 3/16/1931  Acct#:      Date:  10/5/2022    Subjective:       History of Present Illness  Patient is a 80 y.o.  female admitted with Acute cholecystitis [K81.0]  Transaminitis [R74.01] who is seen in consult for elevated liver enzymes, cholecystitis. History of A. fib on Eliquis. Last took this Tuesday morning. History of CHF. History of upper abdominal pain attributed to gallstones several months ago and was managed nonoperatively. Patient began having pain in the mid upper back on . It would wax and wane. Maybe had some abdominal discomfort but mainly felt pain in the back. Some mild nausea but no vomiting. Came to the ER. Noted to have elevated liver enzymes. Imaging consistent with gallstones, cholecystitis, biliary dilation. She was given a dose of pain medicine in the ER and pain has since resolved. Past Medical History:   Diagnosis Date    Atrial fibrillation ([facility] [address]     Diastolic CHF ([facility] [address]     Hypertension       History reviewed. No pertinent surgical history. Past Endoscopic History    Admission Meds  No current facility-administered medications on file prior to encounter. Current Outpatient Medications on File Prior to Encounter   Medication Sig Dispense Refill    Apixaban (ELIQUIS PO) Take 5 mg by mouth in the morning and at bedtime      amLODIPine (NORVASC) 5 MG tablet Take 1 tablet by mouth daily      atorvastatin (LIPITOR) 10 MG tablet Take 1 tablet by mouth at bedtime      lisinopril (PRINIVIL;ZESTRIL) 20 MG tablet Take 1 tablet by mouth daily      metoprolol succinate (TOPROL XL) 25 MG extended release tablet Take 1 tablet by mouth daily              Allergies  No Known Allergies   Social   Social History     Tobacco Use    Smoking status: Never    Smokeless tobacco: Not on file   Substance Use Topics    Alcohol use: Not Currently        History reviewed. No pertinent family history. input(s): OCCULTBLD in the last 72 hours. Imaging Studies:                            Ultrasound 10/4/22     Impression   1. Cholelithiasis is identified, with minimal pericholecystic fluid. There   is also a focal area of masslike thickening and increased echogenicity as   well as posterior q.6 shadowing in a non dependent location involving the   fundus of the gallbladder. A gallbladder mass cannot be excluded. Recommend   further evaluation with dedicated CT imaging of the abdomen and pelvis with   contrast.   2. Dilation of the common bile duct. This can be assessed on CT imaging as   well. 3. There are cysts associated with the right kidney, as well as questionable   hydronephrosis. This can be assessed on CT imaging. CT-scan of abdomen and pelvis w iv contrast 10/5/22:  Impression   Cholelithiasis. Gallbladder wall thickening, pericholecystic fat stranding   and trace fluid suggest acute cholecystitis. Mild to moderate common bile duct dilatation extending to the ampulla. Choledocholithiasis must be considered. Follow-up MRCP would be helpful in   further evaluation. Sigmoid colon diverticulosis. No evidence of diverticulitis. 2.5 cm left ovarian cyst.  Follow-up recommendation as below. MRI/MRCP 10/5/22  Impression:     Findings suggestive of acute cholecystitis. No suspicious gallbladder mass   seen as suggested on prior ultrasound. Multiple gallstones are seen. There is a small filling defect seen in the distal common duct, either small   amount of sludge or tiny stone. Assessment:     Acute cholecystitis with choledocholithiasis  -sudden onset of mid upper back pain. Liver enzymes elevated. Initial ultrasound with gallstones and masslike thickening of the gallbladder with biliary dilation. Follow-up CT was more suggestive of acute cholecystitis. Was noted to have mild to moderate CBD dilation. Pain now resolved. Transaminases, bili and alk phos all improved today. MRCP with small stone in distal CBD. Recommendations:   -NPO midnight  -On Zosyn  -Hold Eliquis  -plan ERCP tomorrow.    -Surgery following for cholecystectomy. Discussed with ROSALINO Alcaraz      I have personally performed a face to face diagnostic evaluation on this patient. I have interviewed and examined the patient and I agree with the findings and recommended plan of care. In summary, my findings and plan are the followin-year-old  female with acute onset mid upper back pain. Work-up in the ER revealed elevated liver enzymes. She had a CT, right upper quadrant ultrasound and MRI. MRI confirmed acute cholecystitis with small CBD stone. At the time I saw the patient in the afternoon, she is symptom-free. Vital signs are stable. Abdominal exam is benign  Impressions:  1. Elevated liver enzymes from choledocholithiasis  2. Acute cholecystitis  3. A. fib on Eliquis, Eliquis has been held  Plans:   Keep n.p.o. after midnight, ERCP with Dr. Aydee Mendes tomorrow, continue Zosyn, laparoscopic cholecystectomy (per general surgery). I discussed plans with patient and her daughter.       Merced Peña MD, MSc  [facility]  10/05/22

## 2022-10-05 NOTE — CONSULTS
[facility]     Consult                                                     Patient Name: Mellisa Lu  MRN: [id number]  YOB: 1931  Admission date: 10/4/2022  7:36 PM   Date of evaluation: 10/5/2022  Primary Care Physician: No primary care provider on file. Reason for consult: Abdominal pain  History of Present Illness:    Ms. Vasiliy Hoyos is a 80 y.o. female who presents with several day history of primarily upper abdominal and back pain between the shoulder blades at night. Lasted for less than a day and then resolved until recurred yesterday prompting emergency department evaluation. Admits to jaundice dark urine but denies fevers chills chest pain dyspnea dysuria change in appetite weight bowel movements or other complaints otherwise. Similar pain several months ago and evaluated at [facility] but deferred surgery as symptoms improved rapidly. Never had abdominal surgery. Medical conditions include atrial fibrillation for which she takes Eliquis with last dose yesterday. Additional medical conditions include hypertension and CHF. Patient feels significantly better since admission. On work-up was found to have jaundice cholecystitis and MRCP showing choledocholithiasis    Past Medical History:        Diagnosis Date    Atrial fibrillation ([facility] [address]     Diastolic CHF ([facility] [address]     Hypertension        Past Surgical History:    History reviewed. No pertinent surgical history.     Scheduled Meds:   [Held by provider] apixaban  5 mg Oral BID    sodium chloride flush  10 mL IntraVENous 2 times per day    lactobacillus  1 capsule Oral BID WC    metoprolol succinate  25 mg Oral Daily    lisinopril  20 mg Oral Daily    atorvastatin  10 mg Oral Daily    amLODIPine  5 mg Oral Daily    piperacillin-tazobactam  3,375 mg IntraVENous Q8H     Continuous Infusions:   sodium chloride      sodium chloride 75 mL/hr at 10/05/22 1617     PRN Meds:.sodium chloride flush, sodium chloride, acetaminophen **OR** acetaminophen, senna, ondansetron, morphine **OR** morphine    Prior to Admission medications    Medication Sig Start Date End Date Taking? Authorizing Provider   Apixaban (ELIQUIS PO) Take 5 mg by mouth in the morning and at bedtime   Yes Historical Provider, MD   amLODIPine (NORVASC) 5 MG tablet Take 1 tablet by mouth daily 8/18/22   Historical Provider, MD   atorvastatin (LIPITOR) 10 MG tablet Take 1 tablet by mouth at bedtime 8/12/22   Historical Provider, MD   lisinopril (PRINIVIL;ZESTRIL) 20 MG tablet Take 1 tablet by mouth daily 7/31/22   Historical Provider, MD   metoprolol succinate (TOPROL XL) 25 MG extended release tablet Take 1 tablet by mouth daily 7/16/22   Historical Provider, MD        Allergies:  Patient has no known allergies. Social History:   Social History     Socioeconomic History    Marital status: Unknown     Spouse name: None    Number of children: None    Years of education: None    Highest education level: None   Tobacco Use    Smoking status: Never   Substance and Sexual Activity    Alcohol use: Not Currently    Drug use: Never       Family History:    History reviewed. No pertinent family history.     Review of Systems:  CONSTITUTIONAL:  Negative except as above  HEENT:  negative  RESPIRATORY:  negative  CARDIOVASCULAR:  negative  GASTROINTESTINAL:  negative except as above   GENITOURINARY:  negative  HEMATOLOGIC/LYMPHATIC:  negative  NEUROLOGICAL:  Negative      Vital Signs:  Patient Vitals for the past 24 hrs:   BP Temp Temp src Pulse Resp SpO2 Height Weight   10/05/22 1222 -- -- -- 72 -- -- -- --   10/05/22 1205 (!) 184/75 97.6 °F (36.4 °C) Oral 70 16 93 % -- --   10/05/22 0733 -- -- -- 86 -- -- -- --   10/05/22 0729 (!) 179/74 97.5 °F (36.4 °C) Oral 85 16 92 % -- --   10/05/22 0414 (!) 155/66 97.8 °F (36.6 °C) Oral 69 14 92 % -- --   10/05/22 0233 (!) 145/53 -- -- 75 14 -- -- --   10/05/22 0140 -- -- -- -- -- -- 5' 7\" (1.702 m) --   10/05/22 0133 (!) 212/79 97.6 °F (36.4 °C) Oral 66 14 93 % -- 148 lb 2.4 oz (67.2 kg)   10/05/22 0023 -- -- -- 60 18 96 % -- --   10/05/22 0021 -- -- -- 75 17 97 % -- --   10/05/22 0020 (!) 183/70 -- -- -- 24 -- -- --   10/04/22 2300 (!) 186/77 -- -- -- -- -- -- --   10/04/22 2215 (!) 192/68 -- -- -- -- -- -- --   10/04/22 2207 -- -- -- -- -- 97 % -- --   10/04/22 2206 -- -- -- -- -- 95 % -- --   10/04/22 2205 (!) 181/64 -- -- -- -- -- -- --   10/04/22 2002 -- 98.4 °F (36.9 °C) Oral -- -- -- -- --   10/04/22 1928 (!) 148/70 (!) 96.2 °F (35.7 °C) Infrared 69 18 94 % -- --      TEMPERATURE HISTORY 24H: Temp (24hrs), Av.5 °F (36.4 °C), Min:96.2 °F (35.7 °C), Max:98.4 °F (36.9 °C)    BLOOD PRESSURE HISTORY: Systolic (50XAX), VZX:943 , Min:145 , ZIK:536    Diastolic (79KZK), PHH:16, Min:53, Max:79    Admission Weight: 148 lb 2.4 oz (67.2 kg)       Intake/Output Summary (Last 24 hours) at 10/5/2022 1638  Last data filed at 10/5/2022 0144  Gross per 24 hour   Intake 10 ml   Output --   Net 10 ml     Drain/tube Output:         Physical Exam:  Constitutional:  well-developed, well-nourished,   Neurologic: awake, Orientation:  Oriented to person, place, time, follows commands, clear speech, cranial nerves grossly intact  Psychiatric: normal affect, no hallucinations  Eyes:  sclera clear, no visible discharge  Head, ears, nose, mouth, throat: normocephalic, without obvious abnormality, supple, symmetrical, trachea midline, no JVD, mucous membranes moist  Cardiovascular: regular rate and rhythm   Respiratory: clear to auscultation  GI: soft, non-distended, non-tender  Lymph: no palpable lymphadenopathy  Skin: no bruising or bleeding, no redness, warmth, or swelling, and jaundice noted    Labs:    CBC:    Recent Labs     10/04/22  2009 10/05/22  0607   WBC 11.4* 7.2   HGB 15.4 13.6   HCT 47.2 40.8    191     BMP:    Recent Labs     10/04/22  2009 10/05/22  0607   * 141   K 4.5 3.7   CL 97* 107   CO2 25 22   BUN 12 9   CREATININE <0.5* <0.5*   GLUCOSE 161* 102*     Hepatic:   Recent Labs     10/04/22  2009 10/05/22  0607   * 299*   * 642*   BILITOT 5.8* 4.8*   ALKPHOS 369* 315*     Amylase: No results for input(s): AMYLASE in the last 72 hours. Lipase:   Recent Labs     10/04/22  2009 10/05/22  0607   LIPASE 38.0 51.0     Mag:      Recent Labs     10/05/22  0607   MG 1.80      Phos:     Recent Labs     10/05/22  0607   PHOS 2.9      Coags:   Recent Labs     10/04/22  2009 10/05/22  0607   INR 1.59* 1.57*   APTT 35.3* 34.8*       Imaging:  I have personally reviewed the following films:  CT ABDOMEN PELVIS W IV CONTRAST Additional Contrast? None    Result Date: 10/5/2022  EXAMINATION: CT OF THE ABDOMEN AND PELVIS WITH CONTRAST 10/5/2022 12:02 am TECHNIQUE: CT of the abdomen and pelvis was performed with the administration of intravenous contrast. Multiplanar reformatted images are provided for review. Automated exposure control, iterative reconstruction, and/or weight based adjustment of the mA/kV was utilized to reduce the radiation dose to as low as reasonably achievable. COMPARISON: Right upper quadrant ultrasound 10/04/2022 HISTORY: ORDERING SYSTEM PROVIDED HISTORY: abdomina pain, abnormal ultrasound TECHNOLOGIST PROVIDED HISTORY: Reason for exam:->abdomina pain, abnormal ultrasound Additional Contrast?->None Decision Support Exception - unselect if not a suspected or confirmed emergency medical condition->Emergency Medical Condition (MA) Reason for Exam: abdomina pain, abnormal ultrasound Relevant Medical/Surgical History: Abnormal CT (Abd pain and had a ct scan today showed gallbladder issues. Had a ekg, blood work, chest x ray) FINDINGS: Lower Chest: Mild chronic atelectasis in the middle. Lung bases are otherwise clear. There is a lower lobe calcified granuloma. The heart size is normal.  There is three-vessel calcific coronary artery disease. Organs: There are faint gallstones.   The gallbladder wall is diffusely thickened and there is pericholecystic fat stranding and trace fluid. There is mild intrahepatic bile dilatation and mild to moderate common bile duct dilatation extending to the ampulla. No definite evidence of choledocholithiasis although the gallstones noted are only faintly radiopaque. The liver, spleen, pancreas, adrenal glands and kidneys are normal. Bilateral renal calcifications likely represent renal artery calcifications. There are a few bilateral renal cortical and parapelvic cysts. No hydronephrosis. GI/Bowel: The appendix is normal.  Sigmoid colon diverticulosis with no evidence of diverticulitis. No bowel obstruction. Pelvis: Urinary bladder was not well distended but appeared grossly normal. There are calcified uterine fibroids. There is a 2.5 cm left ovarian cyst. Peritoneum/Retroperitoneum: There is no adenopathy, free air or free fluid. Calcific aorto iliac atherosclerotic disease with no abdominal aortic aneurysm. Bones/Soft Tissues: No acute bone finding. Cholelithiasis. Gallbladder wall thickening, pericholecystic fat stranding and trace fluid suggest acute cholecystitis. Mild to moderate common bile duct dilatation extending to the ampulla. Choledocholithiasis must be considered. Follow-up MRCP would be helpful in further evaluation. Sigmoid colon diverticulosis. No evidence of diverticulitis. 2.5 cm left ovarian cyst.  Follow-up recommendation as below. RECOMMENDATIONS: Managing Incidental Adnexal Cystic Mass by CT or MR Late postmenopausal < or equal to 3 cm: no follow up >3 cm: US promptly Reference: Chris Montelongo al. Managing Incidental Findings on Abdominal CT: White Paper of the ACR Incidental Findings Committee. J Am Lo Radiol 2010;7:754-773     US GALLBLADDER RUQ    Result Date: 10/4/2022  EXAMINATION: RIGHT UPPER QUADRANT ULTRASOUND 10/4/2022 9:36 pm COMPARISON: None.  HISTORY: ORDERING SYSTEM PROVIDED HISTORY: RUQ PAIN TECHNOLOGIST PROVIDED HISTORY: Reason for exam:->RUQ PAIN FINDINGS: LIVER:  The liver demonstrates normal echogenicity without evidence of intrahepatic biliary ductal dilatation. BILIARY SYSTEM:  Stones are noted within the gallbladder. Negative sonographic Portillo's sign per the ultrasound technologist.  Gallbladder wall thickening is seen. In the region of the gallbladder fundus, there is non dependent echogenic masslike structure seen with posterior acoustic shadowing. Common bile duct is within normal limits measuring 11.2 mm. RIGHT KIDNEY: There are 2 separate cystic structures associated with the right kidney though no definite solid mass. There is also questionable hydronephrosis. Renal length as measured is 8.9 cm, though this may be technique related. PANCREAS:  Visualized portions of the pancreas are unremarkable. OTHER: No evidence of right upper quadrant ascites. 1. Cholelithiasis is identified, with minimal pericholecystic fluid. There is also a focal area of masslike thickening and increased echogenicity as well as posterior q.6 shadowing in a non dependent location involving the fundus of the gallbladder. A gallbladder mass cannot be excluded. Recommend further evaluation with dedicated CT imaging of the abdomen and pelvis with contrast. 2. Dilation of the common bile duct. This can be assessed on CT imaging as well. 3. There are cysts associated with the right kidney, as well as questionable hydronephrosis. This can be assessed on CT imaging. MRI ABDOMEN W WO CONTRAST MRCP    Result Date: 10/5/2022  EXAMINATION: MRI OF THE ABDOMEN WITH AND WITHOUT CONTRAST AND MRCP 10/5/2022 11:54 am TECHNIQUE: Multiplanar multisequence MRI of the abdomen was performed with and without the administration of intravenous contrast.  After initial T2 axial and coronal images, thick slab, thin slab and 3D coronal MRCP sequences were obtained without the administration of intravenous contrast.  MIP images are provided for review. COMPARISON: Recent CT. Recent ultrasound HISTORY: ORDERING SYSTEM PROVIDED HISTORY: obstructive jaundice. possible GB cancer per prior imaging. TECHNOLOGIST PROVIDED HISTORY: Reason for exam:->obstructive jaundice. possible GB cancer per prior imaging. Reason for Exam: possible GB caner per prior imaging FINDINGS: Motion artifact degrades the study. This decreases sensitivity and specificity trace pleural fluid is seen. Gallbladder: Gallbladder is contracted. Multiple gallstones are seen. . There is trace pericholecystic fluid. There is gallbladder wall thickening. A definite gallbladder mass is not seen as suggested on prior ultrasound images Bile Ducts: Extrahepatic common duct measures 10 mm. Small filling defect is seen in distal common duct measuring 3 mm., On coronal T2 weighted images. This is however not seen on MIP images. Pancreatic Duct: No pancreatic ductal dilatation Right adrenal gland is normal.  Left adrenal gland is normal. No hydronephrosis on right. No hydronephrosis on left T2 hyperintense foci seen in the right and left kidney, measuring 1.8 cm in size or less, likely cyst. No retroperitoneal adenopathy. Atherosclerotic change is seen in aorta No significant small or large bowel distention noted. Scattered colonic diverticula are seen Other:     Findings suggestive of acute cholecystitis. No suspicious gallbladder mass seen as suggested on prior ultrasound. Multiple gallstones are seen. There is a small filling defect seen in the distal common duct, either small amount of sludge or tiny stone. Cultures:  Relevant cultures documented under results section     Assessment:  Patient Active Problem List   Diagnosis    Acute cholecystitis     Cholecystitis  Choledocholithiasis  Atrial fibrillation  CHF  Medical coagulopathy, Eliquis    Plan:  1. Discussed with gastroenterology, anticipate ERCP tomorrow for clearance of choledocholithiasis  2.   Additionally the patient has a history, exam, and imaging consistent with cholecystitis. We discussed the risks, benefits and alternatives of a laparoscopic cholecystectomy with cholangiogram. Specific risks discussed include bleeding, infection, injury to surrounding structures, possible requirement of additional procedures, and conversion to open, as well as the perioperative risks associated with general anesthesia. Benefits include resolution of symptoms and prevention of future complications related to cholelithiasis. Alternatives include observation with medical management. Details of the procedure were discussed and all questions answered. The patient understands, agrees, and wishes to proceed. Will continue to daily evaluate but anticipate cholecystectomy after ERCP recovery later this admission if stable  2. NPO after midnight  3. IVF  4. Antibiotics  5. Pain medication and antiemetics as needed  6. Activity as able  7. Hold PO anticoagulation  8. Defer management of remainder of medical comorbidities to primary and consulting teams    This plan was discussed at length with the patient. She was understanding and in agreement with the plan  Thank you for the consult and allowing me to participate in the care of this patient. I look forward to following her this admission    Ernesto Pulido MD, FACS  10/5/2022  4:38 PM

## 2022-10-05 NOTE — ED PROVIDER NOTES
[address] [facility] PRIMARY CARE  [address] [facility] [address]  Dept: [phone]  Dept Fax: [phone]  Loc: [phone]     2020     Cristiano Balderrama (:  1985) is a 29 y.o. female, here for evaluation of the following medical concerns:    Chief Complaint   Patient presents with    Annual Exam       HPI   Routine annual exam.  She and her partner are hoping to have another baby. They are working with a fertility specialist.  Her last baby was quite large. She states she is working on Kegel exercises to help her pelvic floor. She is not currently breast-feeding. She is having to lose some weight before getting pregnant. They are currently giving her medications as if she had PCOS. Denies problems with blood pressure or glucose control while pregnant. The pregnancy was otherwise uneventful. Unfortunately her mother passed away before the baby was born. Review of Systems   Constitutional: Negative for fatigue and fever. HENT: Negative for rhinorrhea, sore throat and trouble swallowing. Eyes: Negative for visual disturbance. Respiratory: Negative for cough and shortness of breath. Cardiovascular: Negative for chest pain and palpitations. Gastrointestinal: Negative for abdominal pain, diarrhea and nausea. Genitourinary: Negative for decreased urine volume, dysuria and frequency. Stress incontinence   Musculoskeletal: Negative for arthralgias and myalgias. Skin: Negative for rash. Allergic/Immunologic: Negative for immunocompromised state. Neurological: Negative for dizziness, numbness and headaches. Hematological: Does not bruise/bleed easily. Psychiatric/Behavioral: Negative for dysphoric mood and sleep disturbance. The patient is not nervous/anxious. Prior to Visit Medications    Medication Sig Taking?  Authorizing Provider   Prenatal Vit-Fe Fumarate-FA (PRENATAL VITAMIN) 27-0.8 MG TABS Takes \"nature [address] [facility]        Pt Name: Mauro Pal  MRN: [id number]  [place] 3/16/1931  Date of evaluation: 10/4/2022  Provider: Sofia Dye PA-C  PCP: No primary care provider on file. Note Started: 9:05 PM EDT       NICKY. I have evaluated this patient. My supervising physician was available for consultation. CHIEF COMPLAINT       Chief Complaint   Patient presents with    Abnormal CT     Abd pain and had a ct scan today showed gallbladder issues. Had a ekg, blood work, chest x ray       HISTORY OF PRESENT ILLNESS   (Location, Timing/Onset, Context/Setting, Quality, Duration, Modifying Factors, Severity, Associated Signs and Symptoms)  Note limiting factors. Chief Complaint: Back pain, abdominal pain    Mauro Pal is a 80 y.o. female who presents to the emergency department today for evaluation for back pain, and abdominal pain. Patient states that she has been experiencing intermittent pain across her back, as well as occasionally to her upper abdomen, and she states that this is actually been ongoing since Sunday. The patient states that the pain seems to wax and wane on its own. Patient states that she went to her primary care physician's office today, she did have lab work, EKG performed. She did have a CT of her chest obtained as there was concern for possible chest or back etiology, which did show some edema around the gallbladder, and the patient was sent to the ED for further care and evaluation. Patient is complaining of pain throughout her upper abdomen and into her back, she is rating her pain as an 8/10. The patient states that her pain is sharp. She states that her pain is \"just on the inside\". As she does not feel that it is reproducible to her back. Patient has noted some occasional nausea and decreased appetite but is not any vomiting. No diarrhea. She has no chest pain. She is no shortness of breath.   She has no fever or chills. She has no cough or congestion. She has no urinary symptoms. She has a history of atrial fibrillation and is anticoagulated on Eliquis. Patient denies any past surgical history to her abdomen. She denies any fall or injury, no other complaints. Nursing Notes were all reviewed and agreed with or any disagreements were addressed in the HPI. REVIEW OF SYSTEMS    (2-9 systems for level 4, 10 or more for level 5)     Review of Systems   Constitutional:  Positive for appetite change. Negative for activity change, chills and fever. HENT:  Negative for congestion and rhinorrhea. Respiratory:  Negative for cough and shortness of breath. Cardiovascular:  Negative for chest pain. Gastrointestinal:  Positive for abdominal distention and nausea. Negative for abdominal pain, diarrhea and vomiting. Genitourinary:  Negative for difficulty urinating, dysuria and hematuria. Musculoskeletal:  Positive for back pain. Positives and Pertinent negatives as per HPI. Except as noted above in the ROS, all other systems were reviewed and negative. PAST MEDICAL HISTORY     Past Medical History:   Diagnosis Date    Atrial fibrillation ([facility] [address]     Hypertension          SURGICAL HISTORY   History reviewed. No pertinent surgical history. CURRENTMEDICATIONS       Previous Medications    AMLODIPINE (NORVASC) 10 MG TABLET    Take 10 mg by mouth daily    APIXABAN (ELIQUIS PO)    Take 5 mg by mouth in the morning and at bedtime    LISINOPRIL (PRINIVIL;ZESTRIL) 5 MG TABLET    Take 5 mg by mouth daily         ALLERGIES     Patient has no known allergies. FAMILYHISTORY     History reviewed. No pertinent family history.        SOCIAL HISTORY       Social History     Tobacco Use    Smoking status: Never   Substance Use Topics    Alcohol use: Not Currently    Drug use: Never       SCREENINGS    Neil Coma Scale  Eye Opening: Spontaneous  Best Verbal Response: Oriented  Best Motor Response: made\"  Yaima Duval MD        Social History     Tobacco Use    Smoking status: Never Smoker    Smokeless tobacco: Never Used   Substance Use Topics    Alcohol use: Yes     Frequency: Monthly or less     Drinks per session: 1 or 2     Binge frequency: Less than monthly    Drug use: Never        Vitals:    08/27/20 1058   BP: 126/88   Site: Right Upper Arm   Position: Sitting   Cuff Size: Large Adult   Pulse: 86  Comment: Regular   Resp: 16   Temp: 97.8 °F (36.6 °C)   TempSrc: Oral   SpO2: 98%  Comment: Room Air   Weight: 254 lb (115.2 kg)   Height: 5' 9\" (1.753 m)     Estimated body mass index is 37.51 kg/m² as calculated from the following:    Height as of this encounter: 5' 9\" (1.753 m). Weight as of this encounter: 254 lb (115.2 kg). Physical Exam  Vitals signs and nursing note reviewed. Constitutional:       General: She is not in acute distress. Appearance: She is well-developed. HENT:      Head: Normocephalic and atraumatic. Right Ear: External ear normal.      Left Ear: External ear normal.      Nose: Nose normal.   Eyes:      General: No scleral icterus. Pupils: Pupils are equal, round, and reactive to light. Neck:      Thyroid: No thyromegaly. Cardiovascular:      Rate and Rhythm: Normal rate and regular rhythm. Heart sounds: No murmur. Pulmonary:      Effort: No respiratory distress. Breath sounds: Normal breath sounds. No wheezing or rales. Abdominal:      General: Bowel sounds are normal. There is no distension. Palpations: Abdomen is soft. Tenderness: There is no abdominal tenderness. Musculoskeletal: Normal range of motion. Lymphadenopathy:      Cervical: No cervical adenopathy. Skin:     General: Skin is warm and dry. Neurological:      Mental Status: She is alert and oriented to person, place, and time. Cranial Nerves: No cranial nerve deficit. Sensory: No sensory deficit.       Coordination: Coordination normal. Obeys commands  Neil Coma Scale Score: 15        PHYSICAL EXAM    (up to 7 for level 4, 8 or more for level 5)     ED Triage Vitals [10/04/22 1928]   BP Temp Temp Source Heart Rate Resp SpO2 Height Weight   (!) 148/70 (!) 96.2 °F (35.7 °C) Infrared 69 18 94 % -- --       Physical Exam  Vitals and nursing note reviewed. Constitutional:       Appearance: She is well-developed. She is not diaphoretic. HENT:      Head: Normocephalic and atraumatic. Right Ear: External ear normal.      Left Ear: External ear normal.      Nose: Nose normal.   Eyes:      General:         Right eye: No discharge. Left eye: No discharge. Neck:      Trachea: No tracheal deviation. Cardiovascular:      Rate and Rhythm: Normal rate. Rhythm irregular. Pulmonary:      Effort: Pulmonary effort is normal. No respiratory distress. Breath sounds: Normal breath sounds. No wheezing or rales. Abdominal:      General: Bowel sounds are normal. There is no distension. Palpations: Abdomen is soft. Tenderness: There is abdominal tenderness in the right upper quadrant and epigastric area. There is no guarding or rebound. Comments: Mild tenderness across the upper abdomen. No rebound or guarding. No peritoneal signs. Musculoskeletal:         General: Normal range of motion. Cervical back: Normal range of motion and neck supple. Comments: There is no reproducible tenderness noted over the back. Skin:     General: Skin is warm and dry. Neurological:      Mental Status: She is alert and oriented to person, place, and time.    Psychiatric:         Behavior: Behavior normal.       DIAGNOSTIC RESULTS   LABS:    Labs Reviewed   CBC WITH AUTO DIFFERENTIAL - Abnormal; Notable for the following components:       Result Value    WBC 11.4 (*)     RBC 5.44 (*)     Neutrophils Absolute 9.8 (*)     Lymphocytes Absolute 0.7 (*)     All other components within normal limits   COMPREHENSIVE METABOLIC PANEL - Psychiatric:         Behavior: Behavior normal.         ASSESSMENT/PLAN:  1. Routine check-up  Medication list updated. Gets labs w employer for lipids, glucose. Return in about 1 year (around 8/27/2021). Age and gender appropriate preventive health care needs were discussed with patient and addressed as needed. Abnormal; Notable for the following components:    Sodium 133 (*)     Chloride 97 (*)     Glucose 161 (*)     Creatinine <0.5 (*)     Total Bilirubin 5.8 (*)     Alkaline Phosphatase 369 (*)      (*)      (*)     All other components within normal limits   PROTIME-INR - Abnormal; Notable for the following components:    Protime 18.9 (*)     INR 1.59 (*)     All other components within normal limits   APTT - Abnormal; Notable for the following components:    aPTT 35.3 (*)     All other components within normal limits   LIPASE   URINALYSIS WITH REFLEX TO CULTURE   BASIC METABOLIC PANEL   CBC WITH AUTO DIFFERENTIAL   HEPATIC FUNCTION PANEL   PROTIME-INR   LACTIC ACID   LIPASE   MAGNESIUM   PHOSPHORUS   PREALBUMIN   APTT   TRANSFERRIN       When ordered only abnormal lab results are displayed. All other labs were within normal range or not returned as of this dictation. EKG: When ordered, EKG's are interpreted by the Emergency Department Physician in the absence of a cardiologist.  Please see their note for interpretation of EKG. RADIOLOGY:   Non-plain film images such as CT, Ultrasound and MRI are read by the radiologist. Plain radiographic images are visualized and preliminarily interpreted by the ED Provider with the below findings:        Interpretation per the Radiologist below, if available at the time of this note:    [address]   Final Result   1. Cholelithiasis is identified, with minimal pericholecystic fluid. There   is also a focal area of masslike thickening and increased echogenicity as   well as posterior q.6 shadowing in a non dependent location involving the   fundus of the gallbladder. A gallbladder mass cannot be excluded. Recommend   further evaluation with dedicated CT imaging of the abdomen and pelvis with   contrast.   2. Dilation of the common bile duct. This can be assessed on CT imaging as   well.    3. There are cysts associated with the right kidney, as well as questionable   hydronephrosis. This can be assessed on CT imaging. CT ABDOMEN PELVIS W IV CONTRAST Additional Contrast? None    (Results Pending)     No results found. PROCEDURES   Unless otherwise noted below, none     Procedures    CRITICAL CARE TIME       CONSULTS:  IP CONSULT TO GENERAL SURGERY  IP CONSULT TO GI      EMERGENCY DEPARTMENT COURSE and DIFFERENTIAL DIAGNOSIS/MDM:   Vitals:    Vitals:    10/04/22 2300 10/05/22 0020 10/05/22 0021 10/05/22 0023   BP: (!) 186/77 (!) 183/70     Pulse:   75 60   Resp:  24 17 18   Temp:       TempSrc:       SpO2:   97% 96%       Patient was given the following medications:  Medications   morphine injection 4 mg (has no administration in time range)   ondansetron (ZOFRAN) injection 4 mg (has no administration in time range)   0.9 % sodium chloride bolus (1,000 mLs IntraVENous New Bag 10/5/22 0021)   piperacillin-tazobactam (ZOSYN) 3,375 mg in dextrose 5 % 50 mL IVPB (mini-bag) (3,375 mg IntraVENous New Bag 10/5/22 0022)   iopamidol (ISOVUE-370) 76 % injection 75 mL (75 mLs IntraVENous Given 10/5/22 0012)         Is this patient to be included in the SEP-1 Core Measure due to severe sepsis or septic shock? No   Exclusion criteria - the patient is NOT to be included for SEP-1 Core Measure due to:  May have criteria for sepsis, but does not meet criteria for severe sepsis or septic shock    Briefly, this is a 80-year-old female who presents to the emergency department today for evaluation for, and abdominal pain. Patient has been experiencing her symptoms intermittently since Sunday back pain. Patient did have an outpatient CT scan performed today which did show some abnormalities of the gallbladder. On examination, she does have mild tenderness noted to her upper abdomen no rebound or guarding. No reproducible tenderness over the back. EKG will be documented by my attending, please see their note for further details.   CBC shows leukocytosis of 11.4 no anemia. CMP shows an AST of 462 ALT of 892, alk phos 369, bilirubin of 5.8 lipase is normal    Ultrasound shows a cholelithiasis with minimal pericholecystic fluid focal area of masslike thickening and increased echogenicity gallbladder mass is questionable. She has dilatation of the common bile duct. CT abdomen pelvis with contrast is pending, I did discuss the case with Dr. Pravin Alvares, general surgery will add on Zosyn, did discuss the case with GI, they will evaluate patient in the a.m. Patient declined wanting any medications in the ED for pain. She will need to be admitted for further care and evaluation, hospitalist has been paged for admission, patient is updated, agreeable plan, stable for admission    FINAL IMPRESSION      1. Acute cholecystitis    2. Transaminitis          DISPOSITION/PLAN   DISPOSITION Admitted 10/04/2022 11:59:33 PM      PATIENT REFERRED TO:  No follow-up provider specified.     DISCHARGE MEDICATIONS:  New Prescriptions    No medications on file       DISCONTINUED MEDICATIONS:  Discontinued Medications    No medications on file              (Please note that portions of this note were completed with a voice recognition program.  Efforts were made to edit the dictations but occasionally words are mis-transcribed.)    Wei Lui PA-C (electronically signed)            Wei Lui PA-C  10/05/22 0112

## 2022-10-06 ENCOUNTER — APPOINTMENT (OUTPATIENT)
Dept: GENERAL RADIOLOGY | Age: 87
DRG: 418 | End: 2022-10-06
Payer: MEDICARE

## 2022-10-06 ENCOUNTER — ANESTHESIA EVENT (OUTPATIENT)
Dept: OPERATING ROOM | Age: 87
DRG: 418 | End: 2022-10-06
Payer: MEDICARE

## 2022-10-06 ENCOUNTER — ANESTHESIA EVENT (OUTPATIENT)
Dept: ENDOSCOPY | Age: 87
DRG: 418 | End: 2022-10-06
Payer: MEDICARE

## 2022-10-06 ENCOUNTER — ANESTHESIA (OUTPATIENT)
Dept: ENDOSCOPY | Age: 87
DRG: 418 | End: 2022-10-06
Payer: MEDICARE

## 2022-10-06 LAB
ALBUMIN SERPL-MCNC: 2.9 G/DL (ref 3.4–5)
ALP BLD-CCNC: 288 U/L (ref 40–129)
ALT SERPL-CCNC: 448 U/L (ref 10–40)
ANION GAP SERPL CALCULATED.3IONS-SCNC: 10 MMOL/L (ref 3–16)
AST SERPL-CCNC: 193 U/L (ref 15–37)
BASOPHILS ABSOLUTE: 0 K/UL (ref 0–0.2)
BASOPHILS RELATIVE PERCENT: 0.7 %
BILIRUB SERPL-MCNC: 3.8 MG/DL (ref 0–1)
BILIRUBIN DIRECT: 3.3 MG/DL (ref 0–0.3)
BILIRUBIN, INDIRECT: 0.5 MG/DL (ref 0–1)
BUN BLDV-MCNC: 10 MG/DL (ref 7–20)
CALCIUM SERPL-MCNC: 8.8 MG/DL (ref 8.3–10.6)
CHLORIDE BLD-SCNC: 109 MMOL/L (ref 99–110)
CO2: 20 MMOL/L (ref 21–32)
CREAT SERPL-MCNC: <0.5 MG/DL (ref 0.6–1.2)
EKG ATRIAL RATE: 59 BPM
EKG DIAGNOSIS: NORMAL
EKG Q-T INTERVAL: 476 MS
EKG QRS DURATION: 102 MS
EKG QTC CALCULATION (BAZETT): 471 MS
EKG R AXIS: -9 DEGREES
EKG T AXIS: 53 DEGREES
EKG VENTRICULAR RATE: 59 BPM
EOSINOPHILS ABSOLUTE: 0.1 K/UL (ref 0–0.6)
EOSINOPHILS RELATIVE PERCENT: 2.1 %
GFR AFRICAN AMERICAN: >60
GFR NON-AFRICAN AMERICAN: >60
GLUCOSE BLD-MCNC: 85 MG/DL (ref 70–99)
HCT VFR BLD CALC: 38.8 % (ref 36–48)
HEMOGLOBIN: 12.7 G/DL (ref 12–16)
INR BLD: 1.31 (ref 0.87–1.14)
LYMPHOCYTES ABSOLUTE: 1.2 K/UL (ref 1–5.1)
LYMPHOCYTES RELATIVE PERCENT: 18.5 %
MCH RBC QN AUTO: 28.5 PG (ref 26–34)
MCHC RBC AUTO-ENTMCNC: 32.8 G/DL (ref 31–36)
MCV RBC AUTO: 87 FL (ref 80–100)
MONOCYTES ABSOLUTE: 0.7 K/UL (ref 0–1.3)
MONOCYTES RELATIVE PERCENT: 11.2 %
NEUTROPHILS ABSOLUTE: 4.2 K/UL (ref 1.7–7.7)
NEUTROPHILS RELATIVE PERCENT: 67.5 %
PDW BLD-RTO: 15 % (ref 12.4–15.4)
PLATELET # BLD: 189 K/UL (ref 135–450)
PMV BLD AUTO: 8.8 FL (ref 5–10.5)
POTASSIUM SERPL-SCNC: 3.4 MMOL/L (ref 3.5–5.1)
PROTHROMBIN TIME: 16.2 SEC (ref 11.7–14.5)
RBC # BLD: 4.46 M/UL (ref 4–5.2)
SODIUM BLD-SCNC: 139 MMOL/L (ref 136–145)
TOTAL PROTEIN: 5.3 G/DL (ref 6.4–8.2)
TRANSFERRIN: 170 MG/DL (ref 200–360)
WBC # BLD: 6.3 K/UL (ref 4–11)

## 2022-10-06 PROCEDURE — 99232 SBSQ HOSP IP/OBS MODERATE 35: CPT | Performed by: SURGERY

## 2022-10-06 PROCEDURE — APPSS15 APP SPLIT SHARED TIME 0-15 MINUTES: Performed by: NURSE PRACTITIONER

## 2022-10-06 PROCEDURE — 0F998ZZ DRAINAGE OF COMMON BILE DUCT, VIA NATURAL OR ARTIFICIAL OPENING ENDOSCOPIC: ICD-10-PCS | Performed by: RADIOLOGY

## 2022-10-06 PROCEDURE — 6360000002 HC RX W HCPCS: Performed by: NURSE ANESTHETIST, CERTIFIED REGISTERED

## 2022-10-06 PROCEDURE — 2580000003 HC RX 258: Performed by: INTERNAL MEDICINE

## 2022-10-06 PROCEDURE — 6360000004 HC RX CONTRAST MEDICATION: Performed by: INTERNAL MEDICINE

## 2022-10-06 PROCEDURE — 3700000000 HC ANESTHESIA ATTENDED CARE: Performed by: INTERNAL MEDICINE

## 2022-10-06 PROCEDURE — C1769 GUIDE WIRE: HCPCS | Performed by: INTERNAL MEDICINE

## 2022-10-06 PROCEDURE — 7100000001 HC PACU RECOVERY - ADDTL 15 MIN: Performed by: INTERNAL MEDICINE

## 2022-10-06 PROCEDURE — 3609015200 HC ERCP REMOVE CALCULI/DEBRIS BILIARY/PANCREAS DUCT: Performed by: INTERNAL MEDICINE

## 2022-10-06 PROCEDURE — 6370000000 HC RX 637 (ALT 250 FOR IP): Performed by: INTERNAL MEDICINE

## 2022-10-06 PROCEDURE — 6360000002 HC RX W HCPCS: Performed by: INTERNAL MEDICINE

## 2022-10-06 PROCEDURE — 2709999900 HC NON-CHARGEABLE SUPPLY: Performed by: INTERNAL MEDICINE

## 2022-10-06 PROCEDURE — 7100000000 HC PACU RECOVERY - FIRST 15 MIN: Performed by: INTERNAL MEDICINE

## 2022-10-06 PROCEDURE — 36415 COLL VENOUS BLD VENIPUNCTURE: CPT

## 2022-10-06 PROCEDURE — 74330 X-RAY BILE/PANC ENDOSCOPY: CPT

## 2022-10-06 PROCEDURE — 3700000001 HC ADD 15 MINUTES (ANESTHESIA): Performed by: INTERNAL MEDICINE

## 2022-10-06 PROCEDURE — 2500000003 HC RX 250 WO HCPCS: Performed by: NURSE ANESTHETIST, CERTIFIED REGISTERED

## 2022-10-06 PROCEDURE — APPNB30 APP NON BILLABLE TIME 0-30 MINS: Performed by: NURSE PRACTITIONER

## 2022-10-06 PROCEDURE — 6360000002 HC RX W HCPCS: Performed by: SURGERY

## 2022-10-06 PROCEDURE — 85025 COMPLETE CBC W/AUTO DIFF WBC: CPT

## 2022-10-06 PROCEDURE — 80048 BASIC METABOLIC PNL TOTAL CA: CPT

## 2022-10-06 PROCEDURE — 93010 ELECTROCARDIOGRAM REPORT: CPT | Performed by: INTERNAL MEDICINE

## 2022-10-06 PROCEDURE — 80076 HEPATIC FUNCTION PANEL: CPT

## 2022-10-06 PROCEDURE — BF101ZZ FLUOROSCOPY OF BILE DUCTS USING LOW OSMOLAR CONTRAST: ICD-10-PCS | Performed by: RADIOLOGY

## 2022-10-06 PROCEDURE — 2580000003 HC RX 258: Performed by: NURSE ANESTHETIST, CERTIFIED REGISTERED

## 2022-10-06 PROCEDURE — 6370000000 HC RX 637 (ALT 250 FOR IP): Performed by: PHYSICIAN ASSISTANT

## 2022-10-06 PROCEDURE — 85610 PROTHROMBIN TIME: CPT

## 2022-10-06 PROCEDURE — 1200000000 HC SEMI PRIVATE

## 2022-10-06 PROCEDURE — 2580000003 HC RX 258: Performed by: SURGERY

## 2022-10-06 RX ORDER — ROCURONIUM BROMIDE 10 MG/ML
INJECTION, SOLUTION INTRAVENOUS PRN
Status: DISCONTINUED | OUTPATIENT
Start: 2022-10-06 | End: 2022-10-06 | Stop reason: SDUPTHER

## 2022-10-06 RX ORDER — AMLODIPINE BESYLATE 5 MG/1
5 TABLET ORAL ONCE
Status: COMPLETED | OUTPATIENT
Start: 2022-10-06 | End: 2022-10-06

## 2022-10-06 RX ORDER — PROPOFOL 10 MG/ML
INJECTION, EMULSION INTRAVENOUS PRN
Status: DISCONTINUED | OUTPATIENT
Start: 2022-10-06 | End: 2022-10-06 | Stop reason: SDUPTHER

## 2022-10-06 RX ORDER — POTASSIUM CHLORIDE 20 MEQ/1
20 TABLET, EXTENDED RELEASE ORAL ONCE
Status: COMPLETED | OUTPATIENT
Start: 2022-10-06 | End: 2022-10-06

## 2022-10-06 RX ORDER — ONDANSETRON 2 MG/ML
INJECTION INTRAMUSCULAR; INTRAVENOUS PRN
Status: DISCONTINUED | OUTPATIENT
Start: 2022-10-06 | End: 2022-10-06 | Stop reason: SDUPTHER

## 2022-10-06 RX ORDER — POTASSIUM CHLORIDE 7.45 MG/ML
10 INJECTION INTRAVENOUS
Status: DISCONTINUED | OUTPATIENT
Start: 2022-10-06 | End: 2022-10-06

## 2022-10-06 RX ORDER — POTASSIUM CHLORIDE 20 MEQ/1
20 TABLET, EXTENDED RELEASE ORAL ONCE
Status: DISCONTINUED | OUTPATIENT
Start: 2022-10-06 | End: 2022-10-06 | Stop reason: CLARIF

## 2022-10-06 RX ORDER — AMLODIPINE BESYLATE 5 MG/1
10 TABLET ORAL DAILY
Status: DISCONTINUED | OUTPATIENT
Start: 2022-10-07 | End: 2022-10-12 | Stop reason: HOSPADM

## 2022-10-06 RX ORDER — FENTANYL CITRATE 50 UG/ML
INJECTION, SOLUTION INTRAMUSCULAR; INTRAVENOUS PRN
Status: DISCONTINUED | OUTPATIENT
Start: 2022-10-06 | End: 2022-10-06 | Stop reason: SDUPTHER

## 2022-10-06 RX ORDER — DEXAMETHASONE SODIUM PHOSPHATE 4 MG/ML
INJECTION, SOLUTION INTRA-ARTICULAR; INTRALESIONAL; INTRAMUSCULAR; INTRAVENOUS; SOFT TISSUE PRN
Status: DISCONTINUED | OUTPATIENT
Start: 2022-10-06 | End: 2022-10-06 | Stop reason: SDUPTHER

## 2022-10-06 RX ORDER — SODIUM CHLORIDE 9 MG/ML
INJECTION, SOLUTION INTRAVENOUS CONTINUOUS PRN
Status: DISCONTINUED | OUTPATIENT
Start: 2022-10-06 | End: 2022-10-06 | Stop reason: SDUPTHER

## 2022-10-06 RX ADMIN — AMLODIPINE BESYLATE 5 MG: 5 TABLET ORAL at 09:08

## 2022-10-06 RX ADMIN — PIPERACILLIN AND TAZOBACTAM 3375 MG: 3; .375 INJECTION, POWDER, FOR SOLUTION INTRAVENOUS at 15:07

## 2022-10-06 RX ADMIN — SODIUM CHLORIDE: 9 INJECTION, SOLUTION INTRAVENOUS at 13:11

## 2022-10-06 RX ADMIN — LISINOPRIL 20 MG: 20 TABLET ORAL at 09:08

## 2022-10-06 RX ADMIN — PIPERACILLIN AND TAZOBACTAM 3375 MG: 3; .375 INJECTION, POWDER, FOR SOLUTION INTRAVENOUS at 22:20

## 2022-10-06 RX ADMIN — SODIUM CHLORIDE: 9 INJECTION, SOLUTION INTRAVENOUS at 22:16

## 2022-10-06 RX ADMIN — PHENYLEPHRINE HYDROCHLORIDE 50 MCG: 10 INJECTION INTRAVENOUS at 13:21

## 2022-10-06 RX ADMIN — ATORVASTATIN CALCIUM 10 MG: 10 TABLET, FILM COATED ORAL at 09:08

## 2022-10-06 RX ADMIN — PROPOFOL 100 MG: 10 INJECTION, EMULSION INTRAVENOUS at 13:21

## 2022-10-06 RX ADMIN — PIPERACILLIN AND TAZOBACTAM 3375 MG: 3; .375 INJECTION, POWDER, FOR SOLUTION INTRAVENOUS at 05:35

## 2022-10-06 RX ADMIN — METOPROLOL SUCCINATE 25 MG: 25 TABLET, EXTENDED RELEASE ORAL at 09:08

## 2022-10-06 RX ADMIN — ACETAMINOPHEN 650 MG: 325 TABLET ORAL at 05:37

## 2022-10-06 RX ADMIN — ONDANSETRON 4 MG: 2 INJECTION INTRAMUSCULAR; INTRAVENOUS at 13:25

## 2022-10-06 RX ADMIN — FENTANYL CITRATE 25 MCG: 50 INJECTION, SOLUTION INTRAMUSCULAR; INTRAVENOUS at 13:17

## 2022-10-06 RX ADMIN — DEXAMETHASONE SODIUM PHOSPHATE 4 MG: 4 INJECTION, SOLUTION INTRAMUSCULAR; INTRAVENOUS at 13:25

## 2022-10-06 RX ADMIN — ROCURONIUM BROMIDE 50 MG: 10 INJECTION, SOLUTION INTRAVENOUS at 13:21

## 2022-10-06 RX ADMIN — SUGAMMADEX 200 MG: 100 INJECTION, SOLUTION INTRAVENOUS at 13:41

## 2022-10-06 RX ADMIN — Medication 1 CAPSULE: at 17:36

## 2022-10-06 RX ADMIN — AMLODIPINE BESYLATE 5 MG: 5 TABLET ORAL at 17:36

## 2022-10-06 RX ADMIN — POTASSIUM CHLORIDE 20 MEQ: 1500 TABLET, EXTENDED RELEASE ORAL at 10:34

## 2022-10-06 ASSESSMENT — PAIN SCALES - GENERAL
PAINLEVEL_OUTOF10: 0
PAINLEVEL_OUTOF10: 2
PAINLEVEL_OUTOF10: 0

## 2022-10-06 ASSESSMENT — PAIN DESCRIPTION - LOCATION: LOCATION: HEAD

## 2022-10-06 ASSESSMENT — ENCOUNTER SYMPTOMS: SHORTNESS OF BREATH: 0

## 2022-10-06 ASSESSMENT — PAIN - FUNCTIONAL ASSESSMENT: PAIN_FUNCTIONAL_ASSESSMENT: 0-10

## 2022-10-06 NOTE — PROGRESS NOTES
Pt awake in bed. VSS, assessment complete and medications given. Pt denies any needs. Daughter at bedside. Call light in reach and bed alarm engaged.

## 2022-10-06 NOTE — PROGRESS NOTES
Hospitalist Progress Note      PCP: No primary care provider on file. Date of Admission: 10/4/2022    Chief Complaint: Abdominal pain    Hospital Course: Presented with abdominal pain. Concerns noted for cholelithiasis and cholecystitis. GI and general surgery consulted. MRCP performed yesterday. ERCP indicated and will be performed later today    Subjective: No chest pain, no shortness of breath, no nausea, no vomiting. Abdominal pain was severe on arrival, currently does not have any abdominal pain. No new issues overnight      Medications:  Reviewed    Infusion Medications    sodium chloride      sodium chloride 75 mL/hr at 10/05/22 1617     Scheduled Medications    [Held by provider] apixaban  5 mg Oral BID    sodium chloride flush  10 mL IntraVENous 2 times per day    lactobacillus  1 capsule Oral BID WC    metoprolol succinate  25 mg Oral Daily    lisinopril  20 mg Oral Daily    atorvastatin  10 mg Oral Daily    amLODIPine  5 mg Oral Daily    piperacillin-tazobactam  3,375 mg IntraVENous Q8H     PRN Meds: sodium chloride flush, sodium chloride, acetaminophen **OR** acetaminophen, senna, ondansetron, morphine **OR** morphine      Intake/Output Summary (Last 24 hours) at 10/6/2022 1159  Last data filed at 10/6/2022 1105  Gross per 24 hour   Intake 0 ml   Output 550 ml   Net -550 ml         Physical Exam Performed:    BP (!) 189/54   Pulse 71   Temp 98.5 °F (36.9 °C) (Temporal)   Resp 18   Ht 5' 7\" (1.702 m)   Wt 148 lb 5.9 oz (67.3 kg)   LMP 09/01/1962 (Within Years)   SpO2 96%   BMI 23.24 kg/m²     General appearance: No apparent distress, appears stated age and cooperative. HEENT: Pupils equal, round, and reactive to light. Conjunctivae/corneas clear. Neck: Supple, with full range of motion. No jugular venous distention. Trachea midline. Respiratory:  Normal respiratory effort. Clear to auscultation, bilaterally without Rales/Wheezes/Rhonchi.   Cardiovascular: Regular rate and rhythm with normal S1/S2 without murmurs, rubs or gallops. Abdomen: Soft, non-tender, non-distended with normal bowel sounds. Musculoskeletal: No clubbing, cyanosis or edema bilaterally. Full range of motion without deformity. Skin: Skin color, texture, turgor normal.  No rashes or lesions. Neurologic:  Neurovascularly intact without any focal sensory/motor deficits. Cranial nerves: II-XII intact, grossly non-focal.  Psychiatric: Alert and oriented, thought content appropriate, normal insight  Capillary Refill: Brisk, 3 seconds, normal   Peripheral Pulses: +2 palpable, equal bilaterally     I examined the patient today (10/06/22). Physical exam is not significantly different compared to yesterday (10/05). Labs:   Recent Labs     10/04/22  2009 10/05/22  0607 10/06/22  0448   WBC 11.4* 7.2 6.3   HGB 15.4 13.6 12.7   HCT 47.2 40.8 38.8    191 189       Recent Labs     10/04/22  2009 10/05/22  0607 10/06/22  0448   * 141 139   K 4.5 3.7 3.4*   CL 97* 107 109   CO2 25 22 20*   BUN 12 9 10   CREATININE <0.5* <0.5* <0.5*   CALCIUM 10.0 8.9 8.8   PHOS  --  2.9  --        Recent Labs     10/04/22  2009 10/05/22  0607 10/06/22  0448   * 299* 193*   * 642* 448*   BILIDIR  --  4.2* 3.3*   BILITOT 5.8* 4.8* 3.8*   ALKPHOS 369* 315* 288*       Recent Labs     10/04/22  2009 10/05/22  0607 10/06/22  0448   INR 1.59* 1.57* 1.31*       No results for input(s): Kelle Weeks in the last 72 hours. Urinalysis:      Lab Results   Component Value Date/Time    NITRU Negative 10/05/2022 02:00 AM    WBCUA 6-9 10/05/2022 02:00 AM    BACTERIA 1+ 10/05/2022 02:00 AM    RBCUA 0-2 10/05/2022 02:00 AM    BLOODU Negative 10/05/2022 02:00 AM    SPECGRAV >=1.030 10/05/2022 02:00 AM    GLUCOSEU Negative 10/05/2022 02:00 AM       Radiology:  MRI ABDOMEN W WO CONTRAST MRCP   Final Result   Findings suggestive of acute cholecystitis. No suspicious gallbladder mass   seen as suggested on prior ultrasound. Multiple gallstones are seen. There is a small filling defect seen in the distal common duct, either small   amount of sludge or tiny stone. CT ABDOMEN PELVIS W IV CONTRAST Additional Contrast? None   Final Result   Cholelithiasis. Gallbladder wall thickening, pericholecystic fat stranding   and trace fluid suggest acute cholecystitis. Mild to moderate common bile duct dilatation extending to the ampulla. Choledocholithiasis must be considered. Follow-up MRCP would be helpful in   further evaluation. Sigmoid colon diverticulosis. No evidence of diverticulitis. 2.5 cm left ovarian cyst.  Follow-up recommendation as below. RECOMMENDATIONS:   Managing Incidental Adnexal Cystic Mass by CT or MR      Late postmenopausal      < or equal to 3 cm: no follow up      >3 cm: US promptly      Reference:      Angelica Alcazar al. Managing Incidental Findings on Abdominal CT: White Paper of   the ACR Incidental Findings Committee. J Am Lo Radiol 2010;7:754-773         US GALLBLADDER RUQ   Final Result   1. Cholelithiasis is identified, with minimal pericholecystic fluid. There   is also a focal area of masslike thickening and increased echogenicity as   well as posterior q.6 shadowing in a non dependent location involving the   fundus of the gallbladder. A gallbladder mass cannot be excluded. Recommend   further evaluation with dedicated CT imaging of the abdomen and pelvis with   contrast.   2. Dilation of the common bile duct. This can be assessed on CT imaging as   well. 3. There are cysts associated with the right kidney, as well as questionable   hydronephrosis. This can be assessed on CT imaging. Assessment/Plan:    Active Hospital Problems    Diagnosis     Acute cholecystitis [K81.0]      Priority: Medium     PLAN:    Acute cholecystitis  CT scan of the abdomen showed CBD dilatation.   Elevated transaminases but lower today  GI and general surgery were consulted  MRCP shows an area suspicious for obstructing common bile duct stone. ERCP planned for today which hopefully will be soon    Paroxysmal atrial fibrillation  Heart rate is controlled. Continue metoprolol. Eliquis on hold in anticipation of the procedure. No changes overnight    Chronic diastolic congestive heart failure  Clinically euvolemic patient's with no acute symptoms. Continue same management. Remains stable. No symptoms    Hypertension  Controlled blood pressure with current management. No need to make any changes. Overall stable. Discussed with patient and family. Questions answered. DVT Prophylaxis: Eliquis on hold  Diet: Diet NPO Exceptions are: Sips of Water with Meds  Diet NPO Exceptions are: Sips of Water with Meds  Code Status: Full Code  PT/OT Eval Status: Consider when plans are clarified    Dispo -inpatient stay pending GI and general surgery recommendations. ERCP today.   Disposition will mainly depend on the outcome of ERCP      Isa Maurer MD

## 2022-10-06 NOTE — PROGRESS NOTES
Pt returned to room from procedure. Pt drowsy but awakens easily. Report received from PACU nurse, family at bedside. Will continue to monitor.

## 2022-10-06 NOTE — PROGRESS NOTES
Pt arrived from endo to PACU bay 4. Report received from endo staff. Pt arouses to voice. Pt on 4 L simple mask, NSR, VSS. Will continue to monitor.

## 2022-10-06 NOTE — PROGRESS NOTES
Patient received Sacr[facility] and communion from Fr. Manjarrez on October 5th, per patient's request.

## 2022-10-06 NOTE — ANESTHESIA POSTPROCEDURE EVALUATION
Department of Anesthesiology  Postprocedure Note    Patient: Che Suarez  MRN: [id number]  YOB: 1931  Date of evaluation: 10/6/2022      Procedure Summary     Date: 10/06/22 Room / Location: [address]    Anesthesia Start: 0627 Anesthesia Stop: 2514    Procedure: ERCP STONE REMOVAL Diagnosis:       Elevated liver enzymes      (Elevated liver enzymes [R74.8])    Surgeons: Frederick Phillips MD Responsible Provider: Shruti Valero MD    Anesthesia Type: general ASA Status: 3          Anesthesia Type: No value filed.     Peyton Phase I: Peyton Score: 8    Peyton Phase II:        Anesthesia Post Evaluation    Patient location during evaluation: PACU  Patient participation: complete - patient participated  Level of consciousness: awake and alert  Airway patency: patent  Nausea & Vomiting: no vomiting and no nausea  Complications: no  Cardiovascular status: hemodynamically stable  Respiratory status: acceptable  Hydration status: stable

## 2022-10-06 NOTE — ANESTHESIA PRE PROCEDURE
Department of Anesthesiology  Preprocedure Note       Name:  Erica Loomis   Age:  80 y.o.  :  3/16/1931                                          MRN:  [id number]         Date:  10/6/2022      Surgeon: Julius Ortiz):  Shital Ferreira MD    Procedure: Procedure(s):  ERCP SPHINCTER/PAPILLOTOMY    Medications prior to admission:   Prior to Admission medications    Medication Sig Start Date End Date Taking?  Authorizing Provider   Apixaban (ELIQUIS PO) Take 5 mg by mouth in the morning and at bedtime   Yes Historical Provider, MD   amLODIPine (NORVASC) 5 MG tablet Take 1 tablet by mouth daily 22   Historical Provider, MD   atorvastatin (LIPITOR) 10 MG tablet Take 1 tablet by mouth at bedtime 22   Historical Provider, MD   lisinopril (PRINIVIL;ZESTRIL) 20 MG tablet Take 1 tablet by mouth daily 22   Historical Provider, MD   metoprolol succinate (TOPROL XL) 25 MG extended release tablet Take 1 tablet by mouth daily 22   Historical Provider, MD       Current medications:    Current Facility-Administered Medications   Medication Dose Route Frequency Provider Last Rate Last Admin    [Held by provider] apixaban (ELIQUIS) tablet 5 mg  5 mg Oral BID Seabron Bumps, PA-C        sodium chloride flush 0.9 % injection 10 mL  10 mL IntraVENous 2 times per day Seabron Bumps, PA-C   10 mL at 10/05/22 0944    sodium chloride flush 0.9 % injection 10 mL  10 mL IntraVENous PRN Seabron Bumps, PA-C   10 mL at 10/05/22 0145    0.9 % sodium chloride infusion   IntraVENous PRN Seabron Bumps, PA-C        acetaminophen (TYLENOL) tablet 650 mg  650 mg Oral Q6H PRN Seabron Bumps, PA-C   650 mg at 10/06/22 0537    Or    acetaminophen (TYLENOL) suppository 650 mg  650 mg Rectal Q6H PRN Seabron Bumps, PA-C        0.9 % sodium chloride infusion   IntraVENous Continuous Seabron Bumps, PA-C 75 mL/hr at 10/05/22 1617 New Bag at 10/05/22 1617    senna (SENOKOT) tablet 8.6 mg  1 tablet Oral Daily PRN Muna Aaron, PA-C        [facility]etron [facility] injection 4 mg  4 mg IntraVENous Q6H PRN Muna [place], PA-C        lactobacillus (CULTURELLE) capsule 1 capsule  1 capsule Oral BID WC Muna Aaron, PA-C   1 capsule at 10/05/22 1733    morphine (PF) injection 2 mg  2 mg IntraVENous Q3H PRN Muna Aaron, PA-C   2 mg at 10/05/22 0145    Or    morphine injection 4 mg  4 mg IntraVENous Q3H PRN Muna [place], PA-C        metoprolol succinate (TOPROL XL) extended release tablet 25 mg  25 mg Oral Daily Muna Aaron, PA-C   25 mg at 10/06/22 0908    lisinopril (PRINIVIL;ZESTRIL) tablet 20 mg  20 mg Oral Daily Muna Aaron, PA-C   20 mg at 10/06/22 0908    atorvastatin (LIPITOR) tablet 10 mg  10 mg Oral Daily Muna [place], PA-C   10 mg at 10/06/22 0908    amLODIPine (NORVASC) tablet 5 mg  5 mg Oral Daily Muna Aaron, PA-C   5 mg at 10/06/22 0908    piperacillin-tazobactam (ZOSYN) 3,375 mg in dextrose 5 % 50 mL IVPB (mini-bag)  3,375 mg IntraVENous Q8H Kd Joseph MD   Stopped at 10/06/22 1035       Allergies:  No Known Allergies    Problem List:    Patient Active Problem List   Diagnosis Code    Acute cholecystitis K81.0       Past Medical History:        Diagnosis Date    Atrial fibrillation ([facility] [address]     Diastolic CHF ([facility] [address]     Hypertension        Past Surgical History:  History reviewed. No pertinent surgical history.     Social History:    Social History     Tobacco Use    Smoking status: Never    Smokeless tobacco: Not on file   Substance Use Topics    Alcohol use: Not Currently                                Counseling given: Not Answered      Vital Signs (Current):   Vitals:    10/06/22 0416 10/06/22 0537 10/06/22 0850 10/06/22 1113   BP: (!) 156/78  (!) 175/88 (!) 159/54   Pulse: 76  70 57   Resp: 18  18 16   Temp: 97.2 °F (36.2 °C)  97.1 °F (36.2 °C) 97.2 °F (36.2 °C)   TempSrc: Oral  Oral Oral   SpO2: 94%  93% 95%   Weight:  148 lb 5.9 oz (67.3 kg) Height:                                                  BP Readings from Last 3 Encounters:   10/06/22 (!) 159/54       NPO Status:                                                                                 BMI:   Wt Readings from Last 3 Encounters:   10/06/22 148 lb 5.9 oz (67.3 kg)     Body mass index is 23.24 kg/m². CBC:   Lab Results   Component Value Date/Time    WBC 6.3 10/06/2022 04:48 AM    RBC 4.46 10/06/2022 04:48 AM    HGB 12.7 10/06/2022 04:48 AM    HCT 38.8 10/06/2022 04:48 AM    MCV 87.0 10/06/2022 04:48 AM    RDW 15.0 10/06/2022 04:48 AM     10/06/2022 04:48 AM       CMP:   Lab Results   Component Value Date/Time     10/06/2022 04:48 AM    K 3.4 10/06/2022 04:48 AM     10/06/2022 04:48 AM    CO2 20 10/06/2022 04:48 AM    BUN 10 10/06/2022 04:48 AM    CREATININE <0.5 10/06/2022 04:48 AM    GFRAA >60 10/06/2022 04:48 AM    AGRATIO 1.3 10/04/2022 08:09 PM    LABGLOM >60 10/06/2022 04:48 AM    GLUCOSE 85 10/06/2022 04:48 AM    PROT 5.3 10/06/2022 04:48 AM    CALCIUM 8.8 10/06/2022 04:48 AM    BILITOT 3.8 10/06/2022 04:48 AM    ALKPHOS 288 10/06/2022 04:48 AM     10/06/2022 04:48 AM     10/06/2022 04:48 AM       POC Tests: No results for input(s): POCGLU, POCNA, POCK, POCCL, POCBUN, POCHEMO, POCHCT in the last 72 hours.     Coags:   Lab Results   Component Value Date/Time    PROTIME 16.2 10/06/2022 04:48 AM    INR 1.31 10/06/2022 04:48 AM    APTT 34.8 10/05/2022 06:07 AM       HCG (If Applicable): No results found for: PREGTESTUR, PREGSERUM, HCG, HCGQUANT     ABGs: No results found for: PHART, PO2ART, WZJ4VAU, MZW6NPH, BEART, Y3CZRLNI     Type & Screen (If Applicable):  No results found for: LABABO, LABRH    Drug/Infectious Status (If Applicable):  No results found for: HIV, HEPCAB    COVID-19 Screening (If Applicable): No results found for: COVID19        Anesthesia Evaluation  Patient summary reviewed and Nursing notes reviewed no history of anesthetic complications:   Airway: Mallampati: I  TM distance: <3 FB   Neck ROM: full  Mouth opening: > = 3 FB   Dental: normal exam         Pulmonary:       (-) asthma and shortness of breath                           Cardiovascular:    (+) hypertension:, dysrhythmias: atrial fibrillation, CHF: diastolic,     (-)  angina                Neuro/Psych:      (-) CVA           GI/Hepatic/Renal:        (-) GERD and liver disease       Endo/Other:    (+) blood dyscrasia: anticoagulation therapy:., .    (-) diabetes mellitus, hypothyroidism               Abdominal:             Vascular:     - PVD. Other Findings:           Anesthesia Plan      general     ASA 3       Induction: intravenous. MIPS: Postoperative opioids intended and Prophylactic antiemetics administered. Anesthetic plan and risks discussed with patient. Use of blood products discussed with patient whom. Plan discussed with CRNA.                     Severa Murdoch, MD   10/6/2022

## 2022-10-06 NOTE — PLAN OF CARE
Problem: Pain  Goal: Verbalizes/displays adequate comfort level or baseline comfort level  10/5/2022 2014 by Onur Condon RN  Outcome: Progressing     Problem: Safety - Adult  Goal: Free from fall injury  10/5/2022 2014 by Onur Condon RN  Outcome: Progressing     Problem: ABCDS Injury Assessment  Goal: Absence of physical injury  10/5/2022 2014 by Onur Condon RN  Outcome: Progressing

## 2022-10-06 NOTE — BRIEF OP NOTE
Brief Postoperative Note - Full Note in Chart Review/Procedures tab       Patient: Levy Mcfadden  YOB: 1931  MRN: [id number]    Date of Procedure: 10/6/2022    Pre-Op Diagnosis: Elevated liver enzymes [R74.8]    Post-Op Diagnosis: Same       Procedure(s):  ERCP STONE REMOVAL    Surgeon(s):  Gi Marc MD    Assistant:  * No surgical staff found *    Anesthesia: General    Estimated Blood Loss (mL): Minimal    Complications: None    Specimens:   * No specimens in log *    Implants:  * No implants in log *      Drains: * No LDAs found *    Findings:   1) Choledocholithiasis with obstruction  2) Biliary sphincterotomy and stone extraction performed    Rec:  1) Clear liquid diet today and advance to low fat diet in AM as tolerated depending on plans for surgery. 2) Follow up as inpatient.        Electronically signed by Gi Marc MD on 10/6/2022 at 1:42 PM

## 2022-10-06 NOTE — PROGRESS NOTES
Reviewed patient's medical and surgical history in electronic record and with patient at the bedside. All questions regarding procedure answered. Scope verified using 2 person system. Family in waiting room.     Electronically signed by Castro Bardales RN on 10/6/2022 at 1:15 PM

## 2022-10-06 NOTE — ANESTHESIA PRE PROCEDURE
Department of Anesthesiology  Preprocedure Note       Name:  Hardik Zelaya   Age:  80 y.o.  :  3/16/1931                                          MRN:  [id number]         Date:  10/6/2022      Surgeon: Adrian Nielson):  Pat Velazco MD    Procedure: Procedure(s):  LAPAROSCOPIC CHOLECYSTECTOMY WITH INTRAOPERATIVE CHOLANGIOGRAM    Medications prior to admission:   Prior to Admission medications    Medication Sig Start Date End Date Taking?  Authorizing Provider   Apixaban (ELIQUIS PO) Take 5 mg by mouth in the morning and at bedtime   Yes Historical Provider, MD   amLODIPine (NORVASC) 5 MG tablet Take 1 tablet by mouth daily 22   Historical Provider, MD   atorvastatin (LIPITOR) 10 MG tablet Take 1 tablet by mouth at bedtime 22   Historical Provider, MD   lisinopril (PRINIVIL;ZESTRIL) 20 MG tablet Take 1 tablet by mouth daily 22   Historical Provider, MD   metoprolol succinate (TOPROL XL) 25 MG extended release tablet Take 1 tablet by mouth daily 22   Historical Provider, MD       Current medications:    Current Facility-Administered Medications   Medication Dose Route Frequency Provider Last Rate Last Admin    potassium chloride (KLOR-CON M) extended release tablet 20 mEq  20 mEq Oral Once Miki Ascencio MD        [Held by provider] apixaban (ELIQUIS) tablet 5 mg  5 mg Oral BID Diana Severe, PA-C        sodium chloride flush 0.9 % injection 10 mL  10 mL IntraVENous 2 times per day Diana Severe, PA-C   10 mL at 10/05/22 0944    sodium chloride flush 0.9 % injection 10 mL  10 mL IntraVENous PRN Diana Severe, PA-C   10 mL at 10/05/22 0145    0.9 % sodium chloride infusion   IntraVENous PRN Diana Severe, PA-C        acetaminophen (TYLENOL) tablet 650 mg  650 mg Oral Q6H PRN Diana Severe, PA-C   650 mg at 10/06/22 0537    Or    acetaminophen (TYLENOL) suppository 650 mg  650 mg Rectal Q6H PRN Diana Severe, PA-C        0.9 % sodium chloride infusion   IntraVENous Continuous Elaine Vyas PA-C 75 mL/hr at 10/05/22 1617 New Bag at 10/05/22 1617    senna (SENOKOT) tablet 8.6 mg  1 tablet Oral Daily PRN Elaine Vyas PA-C        ondansetron [facility]) injection 4 mg  4 mg IntraVENous Q6H PRN Elaine Vyas PA-C        lactobacillus (CULTURELLE) capsule 1 capsule  1 capsule Oral BID WC Elaine Vyas PA-C   1 capsule at 10/05/22 1733    morphine (PF) injection 2 mg  2 mg IntraVENous Q3H PRN Elaine Vyas PA-C   2 mg at 10/05/22 0145    Or    morphine injection 4 mg  4 mg IntraVENous Q3H PRN Elaine Vyas PA-C        metoprolol succinate (TOPROL XL) extended release tablet 25 mg  25 mg Oral Daily Elaine Vyas PA-C   25 mg at 10/06/22 0908    lisinopril (PRINIVIL;ZESTRIL) tablet 20 mg  20 mg Oral Daily Elaine Vyas PA-C   20 mg at 10/06/22 0908    atorvastatin (LIPITOR) tablet 10 mg  10 mg Oral Daily Elaine Vyas PA-C   10 mg at 10/06/22 0908    amLODIPine (NORVASC) tablet 5 mg  5 mg Oral Daily Elaine Vyas PA-C   5 mg at 10/06/22 0908    piperacillin-tazobactam (ZOSYN) 3,375 mg in dextrose 5 % 50 mL IVPB (mini-bag)  3,375 mg IntraVENous Q8H Sarbjit Nesbitt MD 12.5 mL/hr at 10/06/22 0535 3,375 mg at 10/06/22 0535       Allergies:  No Known Allergies    Problem List:    Patient Active Problem List   Diagnosis Code    Acute cholecystitis K81.0       Past Medical History:        Diagnosis Date    [facility] fibrillation ([facility] [address]     Diastolic CHF ([facility] [address]     Hypertension        Past Surgical History:  History reviewed. No pertinent surgical history.     Social History:    Social History     Tobacco Use    Smoking status: Never    Smokeless tobacco: Not on file   Substance Use Topics    Alcohol use: Not Currently                                Counseling given: Not Answered      Vital Signs (Current):   Vitals:    10/05/22 2342 10/06/22 0416 10/06/22 0537 10/06/22 0850   BP: (!) 166/78 (!) 156/78  (!) 175/88   Pulse: 72 76  70 Resp: 18 18  18   Temp: 36.2 °C (97.2 °F) 36.2 °C (97.2 °F)  36.2 °C (97.1 °F)   TempSrc: Oral Oral  Oral   SpO2: 94% 94%  93%   Weight:   148 lb 5.9 oz (67.3 kg)    Height:                                                  BP Readings from Last 3 Encounters:   10/06/22 (!) 175/88       NPO Status:                                                                                 BMI:   Wt Readings from Last 3 Encounters:   10/06/22 148 lb 5.9 oz (67.3 kg)     Body mass index is 23.24 kg/m². CBC:   Lab Results   Component Value Date/Time    WBC 6.3 10/06/2022 04:48 AM    RBC 4.46 10/06/2022 04:48 AM    HGB 12.7 10/06/2022 04:48 AM    HCT 38.8 10/06/2022 04:48 AM    MCV 87.0 10/06/2022 04:48 AM    RDW 15.0 10/06/2022 04:48 AM     10/06/2022 04:48 AM       CMP:   Lab Results   Component Value Date/Time     10/06/2022 04:48 AM    K 3.4 10/06/2022 04:48 AM     10/06/2022 04:48 AM    CO2 20 10/06/2022 04:48 AM    BUN 10 10/06/2022 04:48 AM    CREATININE <0.5 10/06/2022 04:48 AM    GFRAA >60 10/06/2022 04:48 AM    AGRATIO 1.3 10/04/2022 08:09 PM    LABGLOM >60 10/06/2022 04:48 AM    GLUCOSE 85 10/06/2022 04:48 AM    PROT 5.3 10/06/2022 04:48 AM    CALCIUM 8.8 10/06/2022 04:48 AM    BILITOT 3.8 10/06/2022 04:48 AM    ALKPHOS 288 10/06/2022 04:48 AM     10/06/2022 04:48 AM     10/06/2022 04:48 AM       POC Tests: No results for input(s): POCGLU, POCNA, POCK, POCCL, POCBUN, POCHEMO, POCHCT in the last 72 hours.     Coags:   Lab Results   Component Value Date/Time    PROTIME 16.2 10/06/2022 04:48 AM    INR 1.31 10/06/2022 04:48 AM    APTT 34.8 10/05/2022 06:07 AM       HCG (If Applicable): No results found for: PREGTESTUR, PREGSERUM, HCG, HCGQUANT     ABGs: No results found for: PHART, PO2ART, FCR7UNI, HFX9JBQ, BEART, [id number]     Type & Screen (If Applicable):  No results found for: LABABO, LABRH    Drug/Infectious Status (If Applicable):  No results found for: HIV, HEPCAB    COVID-19 Screening (If Applicable): No results found for: COVID19        Anesthesia Evaluation  Patient summary reviewed and Nursing notes reviewed no history of anesthetic complications:   Airway: Mallampati: I  TM distance: >3 FB   Neck ROM: full  Mouth opening: < 3 FB   Dental:          Pulmonary:                              Cardiovascular:    (+) hypertension:, dysrhythmias: atrial fibrillation, CHF: diastolic,       ECG reviewed                        Neuro/Psych:               GI/Hepatic/Renal:             Endo/Other:    (+) blood dyscrasia: anticoagulation therapy:., .                 Abdominal:             Vascular: Other Findings:           Anesthesia Plan      general     ASA 3       Induction: intravenous. MIPS: Postoperative opioids intended and Prophylactic antiemetics administered. Anesthetic plan and risks discussed with patient. Plan discussed with CRNA.                     TIM Gregg - SAUNDRA   10/6/2022

## 2022-10-06 NOTE — PROGRESS NOTES
[facility]        Progress Note    Patient Name: Neha Duque  MRN: [id number]  YOB: 1931  Date of Evaluation: 10/6/2022    Chief Complaint: Abdominal pain    Subjective:  No acute events overnight  Pain controlled  Complains of palpitations  No nausea or vomiting  Resting in bed at this time      Vital Signs:  Patient Vitals for the past 24 hrs:   BP Temp Temp src Pulse Resp SpO2 Weight   10/06/22 1113 (!) 159/54 97.2 °F (36.2 °C) Oral 57 16 95 % --   10/06/22 0850 (!) 175/88 97.1 °F (36.2 °C) Oral 70 18 93 % --   10/06/22 0537 -- -- -- -- -- -- 148 lb 5.9 oz (67.3 kg)   10/06/22 0416 (!) 156/78 97.2 °F (36.2 °C) Oral 76 18 94 % --   10/05/22 2342 (!) 166/78 97.2 °F (36.2 °C) Oral 72 18 94 % --   10/05/22 2006 (!) 160/83 97.2 °F (36.2 °C) Oral 75 18 95 % --   10/05/22 1735 (!) 166/65 97.2 °F (36.2 °C) -- 78 16 93 % --   10/05/22 1222 -- -- -- 72 -- -- --   10/05/22 1205 (!) 184/75 97.6 °F (36.4 °C) Oral 70 16 93 % --      TEMPERATURE HISTORY 24H: Temp (24hrs), Av.2 °F (36.2 °C), Min:97.1 °F (36.2 °C), Max:97.6 °F (36.4 °C)    BLOOD PRESSURE HISTORY: Systolic (78EYZ), FEA:065 , Min:145 , PME:777    Diastolic (34CJO), VMB:79, Min:53, Max:88      Intake/Output:  I/O last 3 completed shifts:   In: 10 [I.V.:10]  Out: -   I/O this shift:  In: -   Out: 550 [Urine:550]  Drain/tube Output:       Physical Exam:  General: awake, alert, oriented to  person, place, time  Cardiovascular:  regular rate and rhythm and no murmur noted  Lungs: clear to auscultation  Abdomen: soft, non-distended, minimal right upper quadrant tenderness only, bowel sounds present     Labs:  CBC:    Recent Labs     10/04/22  2009 10/05/22  0607 10/06/22  0448   WBC 11.4* 7.2 6.3   HGB 15.4 13.6 12.7   HCT 47.2 40.8 38.8    191 189     BMP:    Recent Labs     10/04/22  2009 10/05/22  0607 10/06/22  0448   * 141 139   K 4.5 3.7 3.4*   CL 97* 107 109   CO2 25 22 20*   BUN 12 9 10 CREATININE <0.5* <0.5* <0.5*   GLUCOSE 161* 102* 85     Hepatic:    Recent Labs     10/04/22  2009 10/05/22  0607 10/06/22  0448   * 299* 193*   * 642* 448*   BILITOT 5.8* 4.8* 3.8*   ALKPHOS 369* 315* 288*     Amylase:  No results found for: AMYLASE  Lipase:    Lab Results   Component Value Date/Time    LIPASE 51.0 10/05/2022 06:07 AM    LIPASE 38.0 10/04/2022 08:09 PM      Mag:    Lab Results   Component Value Date/Time    MG 1.80 10/05/2022 06:07 AM     Phos:     Lab Results   Component Value Date/Time    PHOS 2.9 10/05/2022 06:07 AM      Coags:   Lab Results   Component Value Date/Time    PROTIME 16.2 10/06/2022 04:48 AM    INR 1.31 10/06/2022 04:48 AM    APTT 34.8 10/05/2022 06:07 AM       Cultures:  Anaerobic culture  No results found for: LABANAE  Fungus stain  No results found for requested labs within last 30 days. Gram stain  No results found for requested labs within last 30 days. Organism  No results found for: [facility]  Surgical culture  No results found for: CXSURG  Blood culture 1  No results found for requested labs within last 30 days. Blood culture 2  No results found for requested labs within last 30 days. Fecal occult  No results found for requested labs within last 30 days. GI bacterial pathogens by PCR  No results found for requested labs within last 30 days. C. difficile  No results found for requested labs within last 30 days. Urine culture  No results found for: LABURIN    Pathology:  No relevant pathology     Imaging:  I have personally reviewed the following films:    CT ABDOMEN PELVIS W IV CONTRAST Additional Contrast? None    Result Date: 10/5/2022  EXAMINATION: CT OF THE ABDOMEN AND PELVIS WITH CONTRAST 10/5/2022 12:02 am TECHNIQUE: CT of the abdomen and pelvis was performed with the administration of intravenous contrast. Multiplanar reformatted images are provided for review.  Automated exposure control, iterative reconstruction, and/or weight based adjustment of the mA/kV was utilized to reduce the radiation dose to as low as reasonably achievable. COMPARISON: Right upper quadrant ultrasound 10/04/2022 HISTORY: ORDERING SYSTEM PROVIDED HISTORY: abdomina pain, abnormal ultrasound TECHNOLOGIST PROVIDED HISTORY: Reason for exam:->abdomina pain, abnormal ultrasound Additional Contrast?->None Decision Support Exception - unselect if not a suspected or confirmed emergency medical condition->Emergency Medical Condition (MA) Reason for Exam: abdomina pain, abnormal ultrasound Relevant Medical/Surgical History: Abnormal CT (Abd pain and had a ct scan today showed gallbladder issues. Had a ekg, blood work, chest x ray) FINDINGS: Lower Chest: Mild chronic atelectasis in the middle. Lung bases are otherwise clear. There is a lower lobe calcified granuloma. The heart size is normal.  There is three-vessel calcific coronary artery disease. Organs: There are faint gallstones. The gallbladder wall is diffusely thickened and there is pericholecystic fat stranding and trace fluid. There is mild intrahepatic bile dilatation and mild to moderate common bile duct dilatation extending to the ampulla. No definite evidence of choledocholithiasis although the gallstones noted are only faintly radiopaque. The liver, spleen, pancreas, adrenal glands and kidneys are normal. Bilateral renal calcifications likely represent renal artery calcifications. There are a few bilateral renal cortical and parapelvic cysts. No hydronephrosis. GI/Bowel: The appendix is normal.  Sigmoid colon diverticulosis with no evidence of diverticulitis. No bowel obstruction. Pelvis: Urinary bladder was not well distended but appeared grossly normal. There are calcified uterine fibroids. There is a 2.5 cm left ovarian cyst. Peritoneum/Retroperitoneum: There is no adenopathy, free air or free fluid. Calcific aorto iliac atherosclerotic disease with no abdominal aortic aneurysm.  Bones/Soft Tissues: No acute bone finding. Cholelithiasis. Gallbladder wall thickening, pericholecystic fat stranding and trace fluid suggest acute cholecystitis. Mild to moderate common bile duct dilatation extending to the ampulla. Choledocholithiasis must be considered. Follow-up MRCP would be helpful in further evaluation. Sigmoid colon diverticulosis. No evidence of diverticulitis. 2.5 cm left ovarian cyst.  Follow-up recommendation as below. RECOMMENDATIONS: Managing Incidental Adnexal Cystic Mass by CT or MR Late postmenopausal < or equal to 3 cm: no follow up >3 cm: US promptly Reference: Philip persaud. Managing Incidental Findings on Abdominal CT: White Paper of the ACR Incidental Findings Committee. J Am Lo Radiol 2010;7:754-773     US GALLBLADDER RUQ    Result Date: 10/4/2022  EXAMINATION: RIGHT UPPER QUADRANT ULTRASOUND 10/4/2022 9:36 pm COMPARISON: None. HISTORY: ORDERING SYSTEM PROVIDED HISTORY: RUQ PAIN TECHNOLOGIST PROVIDED HISTORY: Reason for exam:->RUQ PAIN FINDINGS: LIVER:  The liver demonstrates normal echogenicity without evidence of intrahepatic biliary ductal dilatation. BILIARY SYSTEM:  Stones are noted within the gallbladder. Negative sonographic Portillo's sign per the ultrasound technologist.  Gallbladder wall thickening is seen. In the region of the gallbladder fundus, there is non dependent echogenic masslike structure seen with posterior acoustic shadowing. Common bile duct is within normal limits measuring 11.2 mm. RIGHT KIDNEY: There are 2 separate cystic structures associated with the right kidney though no definite solid mass. There is also questionable hydronephrosis. Renal length as measured is 8.9 cm, though this may be technique related. PANCREAS:  Visualized portions of the pancreas are unremarkable. OTHER: No evidence of right upper quadrant ascites. 1. Cholelithiasis is identified, with minimal pericholecystic fluid.   There is also a focal area of masslike thickening and increased echogenicity as well as posterior q.6 shadowing in a non dependent location involving the fundus of the gallbladder. A gallbladder mass cannot be excluded. Recommend further evaluation with dedicated CT imaging of the abdomen and pelvis with contrast. 2. Dilation of the common bile duct. This can be assessed on CT imaging as well. 3. There are cysts associated with the right kidney, as well as questionable hydronephrosis. This can be assessed on CT imaging. MRI ABDOMEN W WO CONTRAST MRCP    Result Date: 10/5/2022  EXAMINATION: MRI OF THE ABDOMEN WITH AND WITHOUT CONTRAST AND MRCP 10/5/2022 11:54 am TECHNIQUE: Multiplanar multisequence MRI of the abdomen was performed with and without the administration of intravenous contrast.  After initial T2 axial and coronal images, thick slab, thin slab and 3D coronal MRCP sequences were obtained without the administration of intravenous contrast.  MIP images are provided for review. COMPARISON: Recent CT. Recent ultrasound HISTORY: ORDERING SYSTEM PROVIDED HISTORY: obstructive jaundice. possible GB cancer per prior imaging. TECHNOLOGIST PROVIDED HISTORY: Reason for exam:->obstructive jaundice. possible GB cancer per prior imaging. Reason for Exam: possible GB caner per prior imaging FINDINGS: Motion artifact degrades the study. This decreases sensitivity and specificity trace pleural fluid is seen. Gallbladder: Gallbladder is contracted. Multiple gallstones are seen. . There is trace pericholecystic fluid. There is gallbladder wall thickening. A definite gallbladder mass is not seen as suggested on prior ultrasound images Bile Ducts: Extrahepatic common duct measures 10 mm. Small filling defect is seen in distal common duct measuring 3 mm., On coronal T2 weighted images. This is however not seen on MIP images. Pancreatic Duct: No pancreatic ductal dilatation Right adrenal gland is normal.  Left adrenal gland is normal. No hydronephrosis on right.   No hydronephrosis on left T2 hyperintense foci seen in the right and left kidney, measuring 1.8 cm in size or less, likely cyst. No retroperitoneal adenopathy. Atherosclerotic change is seen in aorta No significant small or large bowel distention noted. Scattered colonic diverticula are seen Other:     Findings suggestive of acute cholecystitis. No suspicious gallbladder mass seen as suggested on prior ultrasound. Multiple gallstones are seen. There is a small filling defect seen in the distal common duct, either small amount of sludge or tiny stone. Scheduled Meds:   [Held by provider] apixaban  5 mg Oral BID    sodium chloride flush  10 mL IntraVENous 2 times per day    lactobacillus  1 capsule Oral BID WC    metoprolol succinate  25 mg Oral Daily    lisinopril  20 mg Oral Daily    atorvastatin  10 mg Oral Daily    amLODIPine  5 mg Oral Daily    piperacillin-tazobactam  3,375 mg IntraVENous Q8H     Continuous Infusions:   sodium chloride      sodium chloride 75 mL/hr at 10/05/22 1617     PRN Meds:.sodium chloride flush, sodium chloride, acetaminophen **OR** acetaminophen, senna, ondansetron, morphine **OR** morphine      Assessment:  80 y.o. female admitted with   1. Acute cholecystitis    2. Transaminitis        Cholecystitis  Choledocholithiasis  Atrial fibrillation  CHF  Medical coagulopathy, on Eliquis--currently held      Plan:  1. Pain controlled, but complains of palpitations this morning, no nausea/vomiting, vitals stable, bilirubin decreasing; continued supportive care, anticipate ERCP today per GI, planning for laparoscopic cholecystectomy tomorrow with Dr. Franklin Person  2. NPO at midnight for OR tomorrow; monitor bowel function  3. IV hydration; monitor and correct electrolytes  4. Antibiotics  5. Activity as tolerated  6. Pulmonary toilet, incentive spirometry  7. PRN analgesics and antiemetics--minimizing narcotics as tolerated  8. DVT prophylaxis with SCD's; hold Eliquis  9.  Management of medical comorbid etiologies per primary team and consulting services    EDUCATION:  Educated patient on plan of care and disease process--all questions answered. Plans discussed with patient and nursing. Reviewed and discussed with Dr. Maren Ronquillo. Signed:  TIM Gutierrez CNP  10/6/2022 11:33 AM    I have personally performed a face to face diagnostic evaluation on this patient. I have interviewed and examined the patient and I agree with the assessment above. Time was spent reviewing patient chart (including but not limited to notes, labs, imaging and other testing), interviewing and counseling patient and present family members, performing physical exam, documentation of my findings and subsequent follow up of ordered medication and testing, placing referrals and communication with patient care providers, coordinating future care as well as documentation in the EHR. This encompassed more than 50% of the total encounter time. In summary, my findings and plan are the following:   Ms. Kishor Helm is a 80 y.o. female who presents with   Cholecystitis  Choledocholithiasis, s/p ERCP 10/6/22  Atrial fibrillation  CHF  Medical coagulopathy, Eliquis     Plan:  1. Appreciate GI support, s/p ERCP with clearance of choledocholithiasis today  2. We discussed the risks, benefits and alternatives of a laparoscopic cholecystectomy with cholangiogram. Specific risks discussed include bleeding, infection, injury to surrounding structures, possible requirement of additional procedures, and conversion to open, as well as the perioperative risks associated with general anesthesia. Benefits include resolution of symptoms and prevention of future complications related to cholelithiasis. Alternatives include observation with medical management. Details of the procedure were discussed and all questions answered. The patient understands, agrees, and wishes to proceed.  Anticipate procedure tomorrow if does not develop pancreatitis or other comorbidity, continue to evaluate daily  2. Clear liquids per GI, NPO after midnight  3. IVF  4. Antibiotics  5. Pain medication and antiemetics as needed  6. Activity as able  7. Hold PO anticoagulation  8. Defer management of remainder of medical comorbidities to primary and consulting teams    Ernesto Fischer MD, FACS  10/6/2022  3:11 PM

## 2022-10-07 ENCOUNTER — APPOINTMENT (OUTPATIENT)
Dept: GENERAL RADIOLOGY | Age: 87
DRG: 418 | End: 2022-10-07
Payer: MEDICARE

## 2022-10-07 ENCOUNTER — ANESTHESIA (OUTPATIENT)
Dept: OPERATING ROOM | Age: 87
DRG: 418 | End: 2022-10-07
Payer: MEDICARE

## 2022-10-07 LAB
ALBUMIN SERPL-MCNC: 3.1 G/DL (ref 3.4–5)
ALP BLD-CCNC: 284 U/L (ref 40–129)
ALT SERPL-CCNC: 352 U/L (ref 10–40)
ANION GAP SERPL CALCULATED.3IONS-SCNC: 9 MMOL/L (ref 3–16)
AST SERPL-CCNC: 117 U/L (ref 15–37)
BASOPHILS ABSOLUTE: 0 K/UL (ref 0–0.2)
BASOPHILS RELATIVE PERCENT: 0.2 %
BILIRUB SERPL-MCNC: 1.8 MG/DL (ref 0–1)
BILIRUBIN DIRECT: 1.2 MG/DL (ref 0–0.3)
BILIRUBIN, INDIRECT: 0.6 MG/DL (ref 0–1)
BUN BLDV-MCNC: 14 MG/DL (ref 7–20)
CALCIUM SERPL-MCNC: 8.7 MG/DL (ref 8.3–10.6)
CHLORIDE BLD-SCNC: 109 MMOL/L (ref 99–110)
CO2: 22 MMOL/L (ref 21–32)
CREAT SERPL-MCNC: 0.6 MG/DL (ref 0.6–1.2)
EOSINOPHILS ABSOLUTE: 0 K/UL (ref 0–0.6)
EOSINOPHILS RELATIVE PERCENT: 0.1 %
GFR AFRICAN AMERICAN: >60
GFR NON-AFRICAN AMERICAN: >60
GLUCOSE BLD-MCNC: 107 MG/DL (ref 70–99)
HCT VFR BLD CALC: 39 % (ref 36–48)
HEMOGLOBIN: 12.7 G/DL (ref 12–16)
LIPASE: 25 U/L (ref 13–60)
LYMPHOCYTES ABSOLUTE: 0.7 K/UL (ref 1–5.1)
LYMPHOCYTES RELATIVE PERCENT: 10.5 %
MCH RBC QN AUTO: 28.2 PG (ref 26–34)
MCHC RBC AUTO-ENTMCNC: 32.6 G/DL (ref 31–36)
MCV RBC AUTO: 86.4 FL (ref 80–100)
MONOCYTES ABSOLUTE: 0.4 K/UL (ref 0–1.3)
MONOCYTES RELATIVE PERCENT: 6.2 %
NEUTROPHILS ABSOLUTE: 5.9 K/UL (ref 1.7–7.7)
NEUTROPHILS RELATIVE PERCENT: 83 %
PDW BLD-RTO: 14.5 % (ref 12.4–15.4)
PLATELET # BLD: 210 K/UL (ref 135–450)
PMV BLD AUTO: 8.7 FL (ref 5–10.5)
POTASSIUM SERPL-SCNC: 4.4 MMOL/L (ref 3.5–5.1)
RBC # BLD: 4.51 M/UL (ref 4–5.2)
SODIUM BLD-SCNC: 140 MMOL/L (ref 136–145)
TOTAL PROTEIN: 5.5 G/DL (ref 6.4–8.2)
WBC # BLD: 7.1 K/UL (ref 4–11)

## 2022-10-07 PROCEDURE — 3600000004 HC SURGERY LEVEL 4 BASE: Performed by: SURGERY

## 2022-10-07 PROCEDURE — 2709999900 HC NON-CHARGEABLE SUPPLY: Performed by: SURGERY

## 2022-10-07 PROCEDURE — 2500000003 HC RX 250 WO HCPCS: Performed by: SURGERY

## 2022-10-07 PROCEDURE — 6360000002 HC RX W HCPCS: Performed by: INTERNAL MEDICINE

## 2022-10-07 PROCEDURE — 83690 ASSAY OF LIPASE: CPT

## 2022-10-07 PROCEDURE — 3209999900 FLUORO FOR SURGICAL PROCEDURES

## 2022-10-07 PROCEDURE — 88304 TISSUE EXAM BY PATHOLOGIST: CPT

## 2022-10-07 PROCEDURE — 6360000002 HC RX W HCPCS: Performed by: SURGERY

## 2022-10-07 PROCEDURE — 1200000000 HC SEMI PRIVATE

## 2022-10-07 PROCEDURE — A4217 STERILE WATER/SALINE, 500 ML: HCPCS | Performed by: SURGERY

## 2022-10-07 PROCEDURE — 6370000000 HC RX 637 (ALT 250 FOR IP): Performed by: SURGERY

## 2022-10-07 PROCEDURE — 2580000003 HC RX 258: Performed by: NURSE ANESTHETIST, CERTIFIED REGISTERED

## 2022-10-07 PROCEDURE — 7100000001 HC PACU RECOVERY - ADDTL 15 MIN: Performed by: SURGERY

## 2022-10-07 PROCEDURE — 2720000010 HC SURG SUPPLY STERILE: Performed by: SURGERY

## 2022-10-07 PROCEDURE — 80076 HEPATIC FUNCTION PANEL: CPT

## 2022-10-07 PROCEDURE — 3600000014 HC SURGERY LEVEL 4 ADDTL 15MIN: Performed by: SURGERY

## 2022-10-07 PROCEDURE — 6370000000 HC RX 637 (ALT 250 FOR IP): Performed by: INTERNAL MEDICINE

## 2022-10-07 PROCEDURE — 2500000003 HC RX 250 WO HCPCS: Performed by: NURSE ANESTHETIST, CERTIFIED REGISTERED

## 2022-10-07 PROCEDURE — 36415 COLL VENOUS BLD VENIPUNCTURE: CPT

## 2022-10-07 PROCEDURE — 3700000001 HC ADD 15 MINUTES (ANESTHESIA): Performed by: SURGERY

## 2022-10-07 PROCEDURE — 47562 LAPAROSCOPIC CHOLECYSTECTOMY: CPT | Performed by: SURGERY

## 2022-10-07 PROCEDURE — 6360000002 HC RX W HCPCS: Performed by: ANESTHESIOLOGY

## 2022-10-07 PROCEDURE — 2580000003 HC RX 258: Performed by: SURGERY

## 2022-10-07 PROCEDURE — 0FT44ZZ RESECTION OF GALLBLADDER, PERCUTANEOUS ENDOSCOPIC APPROACH: ICD-10-PCS | Performed by: SURGERY

## 2022-10-07 PROCEDURE — 85025 COMPLETE CBC W/AUTO DIFF WBC: CPT

## 2022-10-07 PROCEDURE — 3700000000 HC ANESTHESIA ATTENDED CARE: Performed by: SURGERY

## 2022-10-07 PROCEDURE — 2580000003 HC RX 258: Performed by: INTERNAL MEDICINE

## 2022-10-07 PROCEDURE — 7100000000 HC PACU RECOVERY - FIRST 15 MIN: Performed by: SURGERY

## 2022-10-07 PROCEDURE — 6360000002 HC RX W HCPCS: Performed by: NURSE ANESTHETIST, CERTIFIED REGISTERED

## 2022-10-07 PROCEDURE — 80048 BASIC METABOLIC PNL TOTAL CA: CPT

## 2022-10-07 RX ORDER — SODIUM CHLORIDE, SODIUM LACTATE, POTASSIUM CHLORIDE, AND CALCIUM CHLORIDE .6; .31; .03; .02 G/100ML; G/100ML; G/100ML; G/100ML
IRRIGANT IRRIGATION
Status: COMPLETED | OUTPATIENT
Start: 2022-10-07 | End: 2022-10-07

## 2022-10-07 RX ORDER — DEXAMETHASONE SODIUM PHOSPHATE 4 MG/ML
INJECTION, SOLUTION INTRA-ARTICULAR; INTRALESIONAL; INTRAMUSCULAR; INTRAVENOUS; SOFT TISSUE PRN
Status: DISCONTINUED | OUTPATIENT
Start: 2022-10-07 | End: 2022-10-07 | Stop reason: SDUPTHER

## 2022-10-07 RX ORDER — OXYCODONE HYDROCHLORIDE 5 MG/1
10 TABLET ORAL EVERY 4 HOURS PRN
Status: DISCONTINUED | OUTPATIENT
Start: 2022-10-07 | End: 2022-10-12 | Stop reason: HOSPADM

## 2022-10-07 RX ORDER — SODIUM CHLORIDE 0.9 % (FLUSH) 0.9 %
5-40 SYRINGE (ML) INJECTION EVERY 12 HOURS SCHEDULED
Status: DISCONTINUED | OUTPATIENT
Start: 2022-10-07 | End: 2022-10-12 | Stop reason: HOSPADM

## 2022-10-07 RX ORDER — BUPIVACAINE HYDROCHLORIDE AND EPINEPHRINE 5; 5 MG/ML; UG/ML
INJECTION, SOLUTION EPIDURAL; INTRACAUDAL; PERINEURAL
Status: COMPLETED | OUTPATIENT
Start: 2022-10-07 | End: 2022-10-07

## 2022-10-07 RX ORDER — ONDANSETRON 2 MG/ML
INJECTION INTRAMUSCULAR; INTRAVENOUS PRN
Status: DISCONTINUED | OUTPATIENT
Start: 2022-10-07 | End: 2022-10-07 | Stop reason: SDUPTHER

## 2022-10-07 RX ORDER — MORPHINE SULFATE 2 MG/ML
2 INJECTION, SOLUTION INTRAMUSCULAR; INTRAVENOUS
Status: DISCONTINUED | OUTPATIENT
Start: 2022-10-07 | End: 2022-10-12 | Stop reason: HOSPADM

## 2022-10-07 RX ORDER — PROPOFOL 10 MG/ML
INJECTION, EMULSION INTRAVENOUS PRN
Status: DISCONTINUED | OUTPATIENT
Start: 2022-10-07 | End: 2022-10-07 | Stop reason: SDUPTHER

## 2022-10-07 RX ORDER — OXYCODONE HYDROCHLORIDE 5 MG/1
5 TABLET ORAL PRN
Status: DISCONTINUED | OUTPATIENT
Start: 2022-10-07 | End: 2022-10-07 | Stop reason: HOSPADM

## 2022-10-07 RX ORDER — ENOXAPARIN SODIUM 100 MG/ML
40 INJECTION SUBCUTANEOUS DAILY
Status: DISCONTINUED | OUTPATIENT
Start: 2022-10-07 | End: 2022-10-12 | Stop reason: HOSPADM

## 2022-10-07 RX ORDER — FENTANYL CITRATE 50 UG/ML
INJECTION, SOLUTION INTRAMUSCULAR; INTRAVENOUS
Status: DISPENSED
Start: 2022-10-07 | End: 2022-10-08

## 2022-10-07 RX ORDER — ONDANSETRON 2 MG/ML
4 INJECTION INTRAMUSCULAR; INTRAVENOUS EVERY 6 HOURS PRN
Status: DISCONTINUED | OUTPATIENT
Start: 2022-10-07 | End: 2022-10-12 | Stop reason: HOSPADM

## 2022-10-07 RX ORDER — FENTANYL CITRATE 50 UG/ML
25 INJECTION, SOLUTION INTRAMUSCULAR; INTRAVENOUS EVERY 5 MIN PRN
Status: DISCONTINUED | OUTPATIENT
Start: 2022-10-07 | End: 2022-10-07 | Stop reason: HOSPADM

## 2022-10-07 RX ORDER — SODIUM CHLORIDE 9 MG/ML
INJECTION, SOLUTION INTRAVENOUS PRN
Status: DISCONTINUED | OUTPATIENT
Start: 2022-10-07 | End: 2022-10-12 | Stop reason: HOSPADM

## 2022-10-07 RX ORDER — MORPHINE SULFATE 4 MG/ML
4 INJECTION, SOLUTION INTRAMUSCULAR; INTRAVENOUS
Status: DISCONTINUED | OUTPATIENT
Start: 2022-10-07 | End: 2022-10-12 | Stop reason: HOSPADM

## 2022-10-07 RX ORDER — ONDANSETRON 4 MG/1
4 TABLET, ORALLY DISINTEGRATING ORAL EVERY 8 HOURS PRN
Status: DISCONTINUED | OUTPATIENT
Start: 2022-10-07 | End: 2022-10-12 | Stop reason: HOSPADM

## 2022-10-07 RX ORDER — SODIUM CHLORIDE 9 MG/ML
INJECTION, SOLUTION INTRAVENOUS CONTINUOUS PRN
Status: DISCONTINUED | OUTPATIENT
Start: 2022-10-07 | End: 2022-10-07 | Stop reason: SDUPTHER

## 2022-10-07 RX ORDER — ROCURONIUM BROMIDE 10 MG/ML
INJECTION, SOLUTION INTRAVENOUS PRN
Status: DISCONTINUED | OUTPATIENT
Start: 2022-10-07 | End: 2022-10-07 | Stop reason: SDUPTHER

## 2022-10-07 RX ORDER — HYDRALAZINE HYDROCHLORIDE 20 MG/ML
10 INJECTION INTRAMUSCULAR; INTRAVENOUS
Status: DISCONTINUED | OUTPATIENT
Start: 2022-10-07 | End: 2022-10-07 | Stop reason: HOSPADM

## 2022-10-07 RX ORDER — DIPHENHYDRAMINE HYDROCHLORIDE 50 MG/ML
12.5 INJECTION INTRAMUSCULAR; INTRAVENOUS
Status: DISCONTINUED | OUTPATIENT
Start: 2022-10-07 | End: 2022-10-07 | Stop reason: HOSPADM

## 2022-10-07 RX ORDER — SODIUM CHLORIDE 9 MG/ML
25 INJECTION, SOLUTION INTRAVENOUS PRN
Status: DISCONTINUED | OUTPATIENT
Start: 2022-10-07 | End: 2022-10-07 | Stop reason: HOSPADM

## 2022-10-07 RX ORDER — HALOPERIDOL 5 MG/ML
1 INJECTION INTRAMUSCULAR
Status: DISCONTINUED | OUTPATIENT
Start: 2022-10-07 | End: 2022-10-07 | Stop reason: HOSPADM

## 2022-10-07 RX ORDER — SODIUM CHLORIDE 9 MG/ML
INJECTION, SOLUTION INTRAVENOUS CONTINUOUS
Status: DISCONTINUED | OUTPATIENT
Start: 2022-10-07 | End: 2022-10-08

## 2022-10-07 RX ORDER — OXYCODONE HYDROCHLORIDE 5 MG/1
5 TABLET ORAL EVERY 4 HOURS PRN
Status: DISCONTINUED | OUTPATIENT
Start: 2022-10-07 | End: 2022-10-12 | Stop reason: HOSPADM

## 2022-10-07 RX ORDER — SODIUM CHLORIDE 0.9 % (FLUSH) 0.9 %
5-40 SYRINGE (ML) INJECTION EVERY 12 HOURS SCHEDULED
Status: DISCONTINUED | OUTPATIENT
Start: 2022-10-07 | End: 2022-10-07 | Stop reason: HOSPADM

## 2022-10-07 RX ORDER — FENTANYL CITRATE 50 UG/ML
INJECTION, SOLUTION INTRAMUSCULAR; INTRAVENOUS PRN
Status: DISCONTINUED | OUTPATIENT
Start: 2022-10-07 | End: 2022-10-07 | Stop reason: SDUPTHER

## 2022-10-07 RX ORDER — SODIUM CHLORIDE 0.9 % (FLUSH) 0.9 %
5-40 SYRINGE (ML) INJECTION PRN
Status: DISCONTINUED | OUTPATIENT
Start: 2022-10-07 | End: 2022-10-12 | Stop reason: HOSPADM

## 2022-10-07 RX ORDER — MAGNESIUM HYDROXIDE 1200 MG/15ML
LIQUID ORAL CONTINUOUS PRN
Status: COMPLETED | OUTPATIENT
Start: 2022-10-07 | End: 2022-10-07

## 2022-10-07 RX ORDER — FENTANYL CITRATE 50 UG/ML
50 INJECTION, SOLUTION INTRAMUSCULAR; INTRAVENOUS EVERY 5 MIN PRN
Status: DISCONTINUED | OUTPATIENT
Start: 2022-10-07 | End: 2022-10-07 | Stop reason: HOSPADM

## 2022-10-07 RX ORDER — OXYCODONE HYDROCHLORIDE 5 MG/1
10 TABLET ORAL PRN
Status: DISCONTINUED | OUTPATIENT
Start: 2022-10-07 | End: 2022-10-07 | Stop reason: HOSPADM

## 2022-10-07 RX ORDER — ACETAMINOPHEN 325 MG/1
650 TABLET ORAL EVERY 6 HOURS
Status: DISCONTINUED | OUTPATIENT
Start: 2022-10-07 | End: 2022-10-12 | Stop reason: HOSPADM

## 2022-10-07 RX ORDER — LABETALOL HYDROCHLORIDE 5 MG/ML
10 INJECTION, SOLUTION INTRAVENOUS
Status: DISCONTINUED | OUTPATIENT
Start: 2022-10-07 | End: 2022-10-07 | Stop reason: HOSPADM

## 2022-10-07 RX ORDER — ONDANSETRON 2 MG/ML
4 INJECTION INTRAMUSCULAR; INTRAVENOUS
Status: COMPLETED | OUTPATIENT
Start: 2022-10-07 | End: 2022-10-07

## 2022-10-07 RX ORDER — SODIUM CHLORIDE 0.9 % (FLUSH) 0.9 %
5-40 SYRINGE (ML) INJECTION PRN
Status: DISCONTINUED | OUTPATIENT
Start: 2022-10-07 | End: 2022-10-07 | Stop reason: HOSPADM

## 2022-10-07 RX ADMIN — FENTANYL CITRATE 50 MCG: 0.05 INJECTION, SOLUTION INTRAMUSCULAR; INTRAVENOUS at 17:39

## 2022-10-07 RX ADMIN — FENTANYL CITRATE 50 MCG: 0.05 INJECTION, SOLUTION INTRAMUSCULAR; INTRAVENOUS at 17:51

## 2022-10-07 RX ADMIN — ONDANSETRON 4 MG: 2 INJECTION INTRAMUSCULAR; INTRAVENOUS at 18:13

## 2022-10-07 RX ADMIN — FENTANYL CITRATE 25 MCG: 50 INJECTION, SOLUTION INTRAMUSCULAR; INTRAVENOUS at 16:41

## 2022-10-07 RX ADMIN — ACETAMINOPHEN 650 MG: 325 TABLET ORAL at 21:19

## 2022-10-07 RX ADMIN — ENOXAPARIN SODIUM 40 MG: 100 INJECTION SUBCUTANEOUS at 21:19

## 2022-10-07 RX ADMIN — PIPERACILLIN AND TAZOBACTAM 3375 MG: 3; .375 INJECTION, POWDER, FOR SOLUTION INTRAVENOUS at 06:15

## 2022-10-07 RX ADMIN — SODIUM CHLORIDE: 9 INJECTION, SOLUTION INTRAVENOUS at 19:36

## 2022-10-07 RX ADMIN — Medication 10 ML: at 10:44

## 2022-10-07 RX ADMIN — SUGAMMADEX 200 MG: 100 INJECTION, SOLUTION INTRAVENOUS at 17:14

## 2022-10-07 RX ADMIN — PIPERACILLIN AND TAZOBACTAM 3375 MG: 3; .375 INJECTION, POWDER, FOR SOLUTION INTRAVENOUS at 15:47

## 2022-10-07 RX ADMIN — ROCURONIUM BROMIDE 30 MG: 10 INJECTION, SOLUTION INTRAVENOUS at 16:05

## 2022-10-07 RX ADMIN — OXYCODONE 5 MG: 5 TABLET ORAL at 21:23

## 2022-10-07 RX ADMIN — FENTANYL CITRATE 25 MCG: 50 INJECTION, SOLUTION INTRAMUSCULAR; INTRAVENOUS at 16:03

## 2022-10-07 RX ADMIN — FENTANYL CITRATE 25 MCG: 50 INJECTION, SOLUTION INTRAMUSCULAR; INTRAVENOUS at 17:18

## 2022-10-07 RX ADMIN — AMLODIPINE BESYLATE 10 MG: 5 TABLET ORAL at 10:42

## 2022-10-07 RX ADMIN — PIPERACILLIN AND TAZOBACTAM 3375 MG: 3; .375 INJECTION, POWDER, FOR SOLUTION INTRAVENOUS at 22:44

## 2022-10-07 RX ADMIN — ROCURONIUM BROMIDE 10 MG: 10 INJECTION, SOLUTION INTRAVENOUS at 16:04

## 2022-10-07 RX ADMIN — PROPOFOL 70 MG: 10 INJECTION, EMULSION INTRAVENOUS at 16:04

## 2022-10-07 RX ADMIN — ONDANSETRON 4 MG: 2 INJECTION INTRAMUSCULAR; INTRAVENOUS at 16:19

## 2022-10-07 RX ADMIN — FENTANYL CITRATE 25 MCG: 50 INJECTION, SOLUTION INTRAMUSCULAR; INTRAVENOUS at 16:19

## 2022-10-07 RX ADMIN — DEXAMETHASONE SODIUM PHOSPHATE 8 MG: 4 INJECTION, SOLUTION INTRAMUSCULAR; INTRAVENOUS at 16:19

## 2022-10-07 RX ADMIN — MORPHINE SULFATE 2 MG: 2 INJECTION, SOLUTION INTRAMUSCULAR; INTRAVENOUS at 01:50

## 2022-10-07 RX ADMIN — SODIUM CHLORIDE: 9 INJECTION, SOLUTION INTRAVENOUS at 12:53

## 2022-10-07 ASSESSMENT — PAIN DESCRIPTION - DESCRIPTORS
DESCRIPTORS: ACHING;DISCOMFORT
DESCRIPTORS: ACHING
DESCRIPTORS: DISCOMFORT

## 2022-10-07 ASSESSMENT — PAIN - FUNCTIONAL ASSESSMENT
PAIN_FUNCTIONAL_ASSESSMENT: PREVENTS OR INTERFERES SOME ACTIVE ACTIVITIES AND ADLS
PAIN_FUNCTIONAL_ASSESSMENT: NONE - DENIES PAIN
PAIN_FUNCTIONAL_ASSESSMENT: PREVENTS OR INTERFERES SOME ACTIVE ACTIVITIES AND ADLS

## 2022-10-07 ASSESSMENT — PAIN SCALES - GENERAL
PAINLEVEL_OUTOF10: 10
PAINLEVEL_OUTOF10: 10
PAINLEVEL_OUTOF10: 6
PAINLEVEL_OUTOF10: 10
PAINLEVEL_OUTOF10: 5

## 2022-10-07 ASSESSMENT — PAIN DESCRIPTION - LOCATION
LOCATION: BACK
LOCATION: ABDOMEN
LOCATION: ABDOMEN

## 2022-10-07 ASSESSMENT — PAIN DESCRIPTION - PAIN TYPE: TYPE: SURGICAL PAIN

## 2022-10-07 ASSESSMENT — PAIN DESCRIPTION - ONSET: ONSET: ON-GOING

## 2022-10-07 NOTE — PROGRESS NOTES
Patient HR keeps dropping to 30's, vital signs WNL except for the HR, patient asymptomatic. Hospitalist notified.  Awaiting response

## 2022-10-07 NOTE — BRIEF OP NOTE
[facility]  Brief Operative Note  Pt Name: Hardik Zelaya  CSN: [id number]  YOB: 1931    Date of Procedure: 10/7/2022    Pre-operative Diagnosis: Acute cholecystitis    Post-operative Diagnosis: same     Procedure: Laparoscopic cholecystectomy    Surgeon(s):  Pat Velazco MD     Surgical Assistant: Netta Jimenez; [facility]    Anesthesia:  General anesthesia    Findings: Full note dictated, Dictation Job Number: [id number]    Estimated Blood Loss: less than 50  ml    Complications: None    Specimens: gallbladder  ID Type Source Tests Collected by Time Destination   A : A) GALLBLADDER Tissue Gallbladder SURGICAL PATHOLOGY Pat Velazco MD 10/7/2022 1705       Implants:  * No implants in log *    Drains: none   * No LDAs found *    Condition: stable     Disposition and Post-operative plan: PACU, med/surg hunt     Ernesto Richardson MD, FACS  10/7/2022  5:17 PM

## 2022-10-07 NOTE — ANESTHESIA POSTPROCEDURE EVALUATION
Department of Anesthesiology  Postprocedure Note    Patient: Fbai Galvez  MRN: [id number]  YOB: 1931  Date of evaluation: 10/7/2022      Procedure Summary     Date: 10/07/22 Room / Location: [address]    Anesthesia Start: 5038 Anesthesia Stop: 1819    Procedure: LAPAROSCOPIC CHOLECYSTECTOMY WITH INTRAOPERATIVE CHOLANGIOGRAM (Abdomen) Diagnosis:       Acute cholecystitis      (Cholecystitis, Cholelithiasis)    Surgeons: Bubba Vazquez MD Responsible Provider: Radha Goyal MD    Anesthesia Type: general ASA Status: 3          Anesthesia Type: No value filed.     Peyton Phase I: Peyton Score: 8    Peyton Phase II:        Anesthesia Post Evaluation    Patient location during evaluation: PACU  Patient participation: complete - patient participated  Level of consciousness: awake and alert  Pain score: 5  Airway patency: patent  Nausea & Vomiting: no nausea and no vomiting  Complications: no  Cardiovascular status: blood pressure returned to baseline  Respiratory status: acceptable  Hydration status: euvolemic  Multimodal analgesia pain management approach

## 2022-10-07 NOTE — PROGRESS NOTES
Pt arrived to PACU from OR, VSS, pt arouses to pain. Laparoscopic incision sites x4 are CDI, ice applied. Will continue to monitor.

## 2022-10-07 NOTE — FLOWSHEET NOTE
Phase 1 complete, pt seen by anesthesiologist. VSS, pt resting comfortably. Incision sites x4 are CDI, ice applied. Will transfer to 3A, family updated.

## 2022-10-07 NOTE — PROGRESS NOTES
Hospitalist Progress Note      PCP: No primary care provider on file. Date of Admission: 10/4/2022    Chief Complaint: Abdominal pain    Hospital Course: Presented with abdominal pain. Concerns noted for cholelithiasis and cholecystitis. GI and general surgery consulted. MRCP and ERCP performed. Patient remained stable. Laparoscopic cholecystectomy planned for today. Blood pressure uncontrolled, medications adjusted    Subjective: No chest pain, no shortness of breath, no nausea, no vomiting. Abdominal pain was severe on arrival, currently does not have any abdominal pain. Overall stable      Medications:  Reviewed    Infusion Medications    sodium chloride      sodium chloride 75 mL/hr at 10/06/22 2216     Scheduled Medications    amLODIPine  10 mg Oral Daily    [Held by provider] apixaban  5 mg Oral BID    sodium chloride flush  10 mL IntraVENous 2 times per day    lactobacillus  1 capsule Oral BID WC    metoprolol succinate  25 mg Oral Daily    lisinopril  20 mg Oral Daily    atorvastatin  10 mg Oral Daily    piperacillin-tazobactam  3,375 mg IntraVENous Q8H     PRN Meds: sodium chloride flush, sodium chloride, acetaminophen **OR** acetaminophen, senna, ondansetron, morphine **OR** morphine      Intake/Output Summary (Last 24 hours) at 10/7/2022 1008  Last data filed at 10/7/2022 0032  Gross per 24 hour   Intake 700 ml   Output 550 ml   Net 150 ml         Physical Exam Performed:    BP (!) 157/83   Pulse 61   Temp 97.2 °F (36.2 °C) (Oral)   Resp 18   Ht 5' 7\" (1.702 m)   Wt 147 lb 11.3 oz (67 kg)   LMP 09/01/1962 (Within Years)   SpO2 96%   BMI 23.13 kg/m²     General appearance: No apparent distress, appears stated age and cooperative. HEENT: Pupils equal, round, and reactive to light. Conjunctivae/corneas clear. Neck: Supple, with full range of motion. No jugular venous distention. Trachea midline. Respiratory:  Normal respiratory effort.  Clear to auscultation, bilaterally without Rales/Wheezes/Rhonchi. Cardiovascular: Regular rate and rhythm with normal S1/S2 without murmurs, rubs or gallops. Abdomen: Soft, non-tender, non-distended with normal bowel sounds. Musculoskeletal: No clubbing, cyanosis or edema bilaterally. Full range of motion without deformity. Skin: Skin color, texture, turgor normal.  No rashes or lesions. Neurologic:  Neurovascularly intact without any focal sensory/motor deficits. Cranial nerves: II-XII intact, grossly non-focal.  Psychiatric: Alert and oriented, thought content appropriate, normal insight  Capillary Refill: Brisk, 3 seconds, normal   Peripheral Pulses: +2 palpable, equal bilaterally     I examined the patient today (10/07/22). Physical exam is not significantly different compared to yesterday (10/06). Labs:   Recent Labs     10/05/22  0607 10/06/22  0448 10/07/22  0552   WBC 7.2 6.3 7.1   HGB 13.6 12.7 12.7   HCT 40.8 38.8 39.0    189 210       Recent Labs     10/05/22  0607 10/06/22  0448 10/07/22  0552    139 140   K 3.7 3.4* 4.4    109 109   CO2 22 20* 22   BUN 9 10 14   CREATININE <0.5* <0.5* 0.6   CALCIUM 8.9 8.8 8.7   PHOS 2.9  --   --        Recent Labs     10/05/22  0607 10/06/22  0448 10/07/22  0552   * 193* 117*   * 448* 352*   BILIDIR 4.2* 3.3* 1.2*   BILITOT 4.8* 3.8* 1.8*   ALKPHOS 315* 288* 284*       Recent Labs     10/04/22  2009 10/05/22  0607 10/06/22  0448   INR 1.59* 1.57* 1.31*       No results for input(s): Cl[facility] Lopez in the last 72 hours. Urinalysis:      Lab Results   Component Value Date/Time    NITRU Negative 10/05/2022 02:00 AM    WBCUA 6-9 10/05/2022 02:00 AM    BACTERIA 1+ 10/05/2022 02:00 AM    RBCUA 0-2 10/05/2022 02:00 AM    BLOODU Negative 10/05/2022 02:00 AM    SPECGRAV >=1.030 10/05/2022 02:00 AM    GLUCOSEU Negative 10/05/2022 02:00 AM       Radiology:  FL ERCP BILIARY AND PANCREATIC S&I   Final Result   ERCP images as above. .  Please refer to the procedure report for further   details. MRI ABDOMEN W WO CONTRAST MRCP   Final Result   Findings suggestive of acute cholecystitis. No suspicious gallbladder mass   seen as suggested on prior ultrasound. Multiple gallstones are seen. There is a small filling defect seen in the distal common duct, either small   amount of sludge or tiny stone. CT ABDOMEN PELVIS W IV CONTRAST Additional Contrast? None   Final Result   Cholelithiasis. Gallbladder wall thickening, pericholecystic fat stranding   and trace fluid suggest acute cholecystitis. Mild to moderate common bile duct dilatation extending to the ampulla. Choledocholithiasis must be considered. Follow-up MRCP would be helpful in   further evaluation. Sigmoid colon diverticulosis. No evidence of diverticulitis. 2.5 cm left ovarian cyst.  Follow-up recommendation as below. RECOMMENDATIONS:   Managing Incidental Adnexal Cystic Mass by CT or MR      Late postmenopausal      < or equal to 3 cm: no follow up      >3 cm: US promptly      Reference:      Marta persaud. Managing Incidental Findings on Abdominal CT: White Paper of   the ACR Incidental Findings Committee. J Am Lo Radiol 2010;7:754-773         US GALLBLADDER RUQ   Final Result   1. Cholelithiasis is identified, with minimal pericholecystic fluid. There   is also a focal area of masslike thickening and increased echogenicity as   well as posterior q.6 shadowing in a non dependent location involving the   fundus of the gallbladder. A gallbladder mass cannot be excluded. Recommend   further evaluation with dedicated CT imaging of the abdomen and pelvis with   contrast.   2. Dilation of the common bile duct. This can be assessed on CT imaging as   well. 3. There are cysts associated with the right kidney, as well as questionable   hydronephrosis. This can be assessed on CT imaging.                  Assessment/Plan:    Active Hospital Problems    Diagnosis     Acute cholecystitis [K81.0]      Priority: Medium     PLAN:    Acute cholecystitis  CT scan of the abdomen showed CBD dilatation. ERCP performed uneventfully. Today's lipase is normal.  Patient to have cholecystectomy later today    Paroxysmal atrial fibrillation  Heart rate is controlled. Continue metoprolol. Eliquis on hold in anticipation of the procedure. No changes overnight, no change    Chronic diastolic congestive heart failure  Clinically euvolemic patient's with no acute symptoms. Continue same management. No symptoms. Overall stable    Hypertension  Blood pressure better today with medication adjustments. Discussed with patient and family. Questions answered. DVT Prophylaxis: Eliquis on hold  Diet: Diet NPO Exceptions are: Sips of Water with Meds  Code Status: Full Code  PT/OT Eval Status: Consider when plans are clarified    Dispo -laparoscopic cholecystectomy today.   Best case scenario is to discharge home tomorrow if stable      Areli Langford MD

## 2022-10-07 NOTE — PROGRESS NOTES
Patients head to toe assessment completed. Vital signs WNL. Bed alarm engaged, call light within reach. Patient has no scheduled medications. Patient denies any pain at the moment. Family at bedside. Patient resting in bed. Will continue to monitor.

## 2022-10-08 ENCOUNTER — APPOINTMENT (OUTPATIENT)
Dept: GENERAL RADIOLOGY | Age: 87
DRG: 418 | End: 2022-10-08
Payer: MEDICARE

## 2022-10-08 LAB
A/G RATIO: 1.1 (ref 1.1–2.2)
ALBUMIN SERPL-MCNC: 2.8 G/DL (ref 3.4–5)
ALP BLD-CCNC: 241 U/L (ref 40–129)
ALT SERPL-CCNC: 295 U/L (ref 10–40)
ANION GAP SERPL CALCULATED.3IONS-SCNC: 12 MMOL/L (ref 3–16)
AST SERPL-CCNC: 112 U/L (ref 15–37)
BASOPHILS ABSOLUTE: 0 K/UL (ref 0–0.2)
BASOPHILS RELATIVE PERCENT: 0.1 %
BILIRUB SERPL-MCNC: 1.5 MG/DL (ref 0–1)
BILIRUBIN DIRECT: 0.9 MG/DL (ref 0–0.3)
BILIRUBIN, INDIRECT: 0.6 MG/DL (ref 0–1)
BUN BLDV-MCNC: 31 MG/DL (ref 7–20)
CALCIUM SERPL-MCNC: 8.5 MG/DL (ref 8.3–10.6)
CHLORIDE BLD-SCNC: 108 MMOL/L (ref 99–110)
CO2: 19 MMOL/L (ref 21–32)
CREAT SERPL-MCNC: 1 MG/DL (ref 0.6–1.2)
EOSINOPHILS ABSOLUTE: 0 K/UL (ref 0–0.6)
EOSINOPHILS RELATIVE PERCENT: 0 %
GFR AFRICAN AMERICAN: >60
GFR NON-AFRICAN AMERICAN: 52
GLUCOSE BLD-MCNC: 180 MG/DL (ref 70–99)
HCT VFR BLD CALC: 31.5 % (ref 36–48)
HEMOGLOBIN: 10.2 G/DL (ref 12–16)
LYMPHOCYTES ABSOLUTE: 0.9 K/UL (ref 1–5.1)
LYMPHOCYTES RELATIVE PERCENT: 5.6 %
MCH RBC QN AUTO: 28.7 PG (ref 26–34)
MCHC RBC AUTO-ENTMCNC: 32.4 G/DL (ref 31–36)
MCV RBC AUTO: 88.5 FL (ref 80–100)
MONOCYTES ABSOLUTE: 1.5 K/UL (ref 0–1.3)
MONOCYTES RELATIVE PERCENT: 8.8 %
NEUTROPHILS ABSOLUTE: 14.3 K/UL (ref 1.7–7.7)
NEUTROPHILS RELATIVE PERCENT: 85.5 %
PDW BLD-RTO: 14.9 % (ref 12.4–15.4)
PLATELET # BLD: 266 K/UL (ref 135–450)
PMV BLD AUTO: 8.9 FL (ref 5–10.5)
POTASSIUM SERPL-SCNC: 4.6 MMOL/L (ref 3.5–5.1)
RBC # BLD: 3.55 M/UL (ref 4–5.2)
SODIUM BLD-SCNC: 139 MMOL/L (ref 136–145)
TOTAL PROTEIN: 5.4 G/DL (ref 6.4–8.2)
WBC # BLD: 16.7 K/UL (ref 4–11)

## 2022-10-08 PROCEDURE — 2580000003 HC RX 258: Performed by: SURGERY

## 2022-10-08 PROCEDURE — 6370000000 HC RX 637 (ALT 250 FOR IP): Performed by: SURGERY

## 2022-10-08 PROCEDURE — 71045 X-RAY EXAM CHEST 1 VIEW: CPT

## 2022-10-08 PROCEDURE — 2580000003 HC RX 258: Performed by: NURSE PRACTITIONER

## 2022-10-08 PROCEDURE — APPSS15 APP SPLIT SHARED TIME 0-15 MINUTES: Performed by: NURSE PRACTITIONER

## 2022-10-08 PROCEDURE — 94761 N-INVAS EAR/PLS OXIMETRY MLT: CPT

## 2022-10-08 PROCEDURE — 80053 COMPREHEN METABOLIC PANEL: CPT

## 2022-10-08 PROCEDURE — 2700000000 HC OXYGEN THERAPY PER DAY

## 2022-10-08 PROCEDURE — 99024 POSTOP FOLLOW-UP VISIT: CPT | Performed by: SURGERY

## 2022-10-08 PROCEDURE — APPNB30 APP NON BILLABLE TIME 0-30 MINS: Performed by: NURSE PRACTITIONER

## 2022-10-08 PROCEDURE — 82248 BILIRUBIN DIRECT: CPT

## 2022-10-08 PROCEDURE — 36415 COLL VENOUS BLD VENIPUNCTURE: CPT

## 2022-10-08 PROCEDURE — 6360000002 HC RX W HCPCS: Performed by: SURGERY

## 2022-10-08 PROCEDURE — 1200000000 HC SEMI PRIVATE

## 2022-10-08 PROCEDURE — 94150 VITAL CAPACITY TEST: CPT

## 2022-10-08 PROCEDURE — 85025 COMPLETE CBC W/AUTO DIFF WBC: CPT

## 2022-10-08 RX ORDER — OXYCODONE HYDROCHLORIDE AND ACETAMINOPHEN 5; 325 MG/1; MG/1
1 TABLET ORAL EVERY 6 HOURS PRN
Qty: 10 TABLET | Refills: 0 | Status: SHIPPED | OUTPATIENT
Start: 2022-10-08 | End: 2022-10-15

## 2022-10-08 RX ORDER — SODIUM CHLORIDE 9 MG/ML
INJECTION, SOLUTION INTRAVENOUS CONTINUOUS
Status: DISCONTINUED | OUTPATIENT
Start: 2022-10-08 | End: 2022-10-10

## 2022-10-08 RX ADMIN — ACETAMINOPHEN 650 MG: 325 TABLET ORAL at 18:09

## 2022-10-08 RX ADMIN — Medication 1 CAPSULE: at 07:55

## 2022-10-08 RX ADMIN — PIPERACILLIN AND TAZOBACTAM 3375 MG: 3; .375 INJECTION, POWDER, FOR SOLUTION INTRAVENOUS at 21:59

## 2022-10-08 RX ADMIN — PIPERACILLIN AND TAZOBACTAM 3375 MG: 3; .375 INJECTION, POWDER, FOR SOLUTION INTRAVENOUS at 06:37

## 2022-10-08 RX ADMIN — ACETAMINOPHEN 650 MG: 325 TABLET ORAL at 06:37

## 2022-10-08 RX ADMIN — Medication 10 ML: at 20:42

## 2022-10-08 RX ADMIN — SODIUM CHLORIDE: 9 INJECTION, SOLUTION INTRAVENOUS at 06:34

## 2022-10-08 RX ADMIN — SODIUM CHLORIDE: 9 INJECTION, SOLUTION INTRAVENOUS at 12:56

## 2022-10-08 RX ADMIN — PIPERACILLIN AND TAZOBACTAM 3375 MG: 3; .375 INJECTION, POWDER, FOR SOLUTION INTRAVENOUS at 14:17

## 2022-10-08 RX ADMIN — OXYCODONE 10 MG: 5 TABLET ORAL at 14:13

## 2022-10-08 RX ADMIN — ENOXAPARIN SODIUM 40 MG: 100 INJECTION SUBCUTANEOUS at 07:57

## 2022-10-08 RX ADMIN — ACETAMINOPHEN 650 MG: 325 TABLET ORAL at 01:05

## 2022-10-08 RX ADMIN — MORPHINE SULFATE 4 MG: 4 INJECTION, SOLUTION INTRAMUSCULAR; INTRAVENOUS at 11:02

## 2022-10-08 RX ADMIN — Medication 10 ML: at 07:56

## 2022-10-08 RX ADMIN — OXYCODONE 10 MG: 5 TABLET ORAL at 07:05

## 2022-10-08 RX ADMIN — Medication 1 CAPSULE: at 18:10

## 2022-10-08 RX ADMIN — ATORVASTATIN CALCIUM 10 MG: 10 TABLET, FILM COATED ORAL at 07:55

## 2022-10-08 RX ADMIN — OXYCODONE 10 MG: 5 TABLET ORAL at 01:05

## 2022-10-08 RX ADMIN — ACETAMINOPHEN 650 MG: 325 TABLET ORAL at 12:54

## 2022-10-08 ASSESSMENT — PAIN SCALES - GENERAL
PAINLEVEL_OUTOF10: 9
PAINLEVEL_OUTOF10: 3
PAINLEVEL_OUTOF10: 10
PAINLEVEL_OUTOF10: 9
PAINLEVEL_OUTOF10: 9
PAINLEVEL_OUTOF10: 10
PAINLEVEL_OUTOF10: 8

## 2022-10-08 ASSESSMENT — PAIN DESCRIPTION - LOCATION
LOCATION: ABDOMEN

## 2022-10-08 ASSESSMENT — PAIN DESCRIPTION - DESCRIPTORS
DESCRIPTORS: ACHING;DISCOMFORT
DESCRIPTORS: ACHING;DISCOMFORT
DESCRIPTORS: DISCOMFORT;ACHING

## 2022-10-08 ASSESSMENT — PAIN DESCRIPTION - ONSET: ONSET: ON-GOING

## 2022-10-08 ASSESSMENT — PAIN DESCRIPTION - PAIN TYPE: TYPE: SURGICAL PAIN

## 2022-10-08 ASSESSMENT — PAIN - FUNCTIONAL ASSESSMENT
PAIN_FUNCTIONAL_ASSESSMENT: PREVENTS OR INTERFERES SOME ACTIVE ACTIVITIES AND ADLS

## 2022-10-08 ASSESSMENT — PAIN DESCRIPTION - FREQUENCY: FREQUENCY: INTERMITTENT

## 2022-10-08 NOTE — PROGRESS NOTES
[facility]        Progress Note    Patient Name: Jenniffer Peterson  MRN: [id number]  YOB: 1931  Date of Evaluation: 10/8/2022    Postoperative Day #1    Subjective:  No acute events overnight  Pain fairly controlled, reports feeling sore  No nausea or vomiting, reports belching, poor appetite  No flatus or stool  Resting in bed at this time      Vital Signs:  Patient Vitals for the past 24 hrs:   BP Temp Temp src Pulse Resp SpO2   10/08/22 1058 (!) 124/53 97.4 °F (36.3 °C) Oral 72 18 90 %   10/08/22 0717 -- -- -- 70 -- --   10/08/22 0715 (!) 90/44 97.4 °F (36.3 °C) Axillary 69 18 97 %   10/08/22 0413 (!) 97/46 97.2 °F (36.2 °C) Axillary 75 20 99 %   10/07/22 2323 (!) 106/50 97.4 °F (36.3 °C) Axillary 64 16 98 %   10/07/22 2030 (!) 125/55 98.1 °F (36.7 °C) Axillary (!) 49 18 96 %   10/07/22 1815 -- 97.9 °F (36.6 °C) Oral -- -- --   10/07/22 1800 (!) 125/53 -- -- (!) 48 10 90 %   10/07/22 1745 (!) 130/48 97.6 °F (36.4 °C) Temporal (!) 44 15 92 %   10/07/22 1740 (!) 133/50 -- -- (!) 47 22 94 %   10/07/22 1735 (!) 130/46 -- -- (!) 46 14 94 %   10/07/22 1729 121/68 97.8 °F (36.6 °C) Temporal (!) 49 16 97 %   10/07/22 1242 (!) 163/80 98.1 °F (36.7 °C) Temporal 58 16 95 %   10/07/22 1218 -- 98.1 °F (36.7 °C) Temporal -- -- --   10/07/22 1128 (!) 170/66 97.9 °F (36.6 °C) Oral 56 16 96 %        TEMPERATURE HISTORY 24H: Temp (24hrs), Av.7 °F (36.5 °C), Min:97.2 °F (36.2 °C), Max:98.1 °F (36.7 °C)    BLOOD PRESSURE HISTORY: Systolic (51OZA), RDO:205 , Min:90 , OBX:055    Diastolic (65QNV), GGD:77, Min:44, Max:83      Intake/Output:  I/O last 3 completed shifts: In: 1 [P.O.:60; I.V.:425]  Out: 325 [Urine:300; Blood:25]  No intake/output data recorded.   Drain/tube Output:       Physical Exam:  General: awake, alert, oriented to person, place, time  Lungs: unlabored respirations  Abdomen: soft, mildly distended, incisional tenderness only, bowel sounds present  Skin/Wound: healing well, no drainage, no erythema, ecchymosis noted, well approximated    Labs:  CBC:    Recent Labs     10/06/22  0448 10/07/22  0552 10/08/22  0714   WBC 6.3 7.1 16.7*   HGB 12.7 12.7 10.2*   HCT 38.8 39.0 31.5*    210 266       BMP:    Recent Labs     10/06/22  0448 10/07/22  0552 10/08/22  0713    140 139   K 3.4* 4.4 4.6    109 108   CO2 20* 22 19*   BUN 10 14 31*   CREATININE <0.5* 0.6 1.0   GLUCOSE 85 107* 180*       Hepatic:    Recent Labs     10/06/22  0448 10/07/22  0552 10/08/22  0713   * 117* 112*   * 352* 295*   BILITOT 3.8* 1.8* 1.5*   ALKPHOS 288* 284* 241*       Amylase:  No results found for: AMYLASE  Lipase:    Lab Results   Component Value Date/Time    LIPASE 25.0 10/07/2022 05:52 AM    LIPASE 51.0 10/05/2022 06:07 AM    LIPASE 38.0 10/04/2022 08:09 PM        Mag:    Lab Results   Component Value Date/Time    MG 1.80 10/05/2022 06:07 AM     Phos:     Lab Results   Component Value Date/Time    PHOS 2.9 10/05/2022 06:07 AM      Coags:   Lab Results   Component Value Date/Time    PROTIME 16.2 10/06/2022 04:48 AM    INR 1.31 10/06/2022 04:48 AM    APTT 34.8 10/05/2022 06:07 AM       Cultures:  Anaerobic culture  No results found for: LABANAE  Fungus stain  No results found for requested labs within last 30 days. Gram stain  No results found for requested labs within last 30 days. Organism  No results found for: [facility]  Surgical culture  No results found for: CXSURG  Blood culture 1  No results found for requested labs within last 30 days. Blood culture 2  No results found for requested labs within last 30 days. Fecal occult  No results found for requested labs within last 30 days. GI bacterial pathogens by PCR  No results found for requested labs within last 30 days. C. difficile  No results found for requested labs within last 30 days.      Urine culture  No results found for: LABURIN    Pathology:  No relevant pathology Imaging:  I have personally reviewed the following films:    FL ERCP BILIARY AND PANCREATIC S&I    Result Date: 10/6/2022  EXAMINATION: 6 SPOT IMAGES FROM AN ERCP COMPARISON: None FLUOROSCOPY DOSE OR TIME/IMAGES: 2 minutes 24 seconds fluoroscopy 6 spot films HISTORY: ORDERING SYSTEM PROVIDED HISTORY: in ENDO TECHNOLOGIST PROVIDED HISTORY: Reason for exam:->in ENDO Reason for Exam:  ERCP BILIARY AND PANCREATIC FINDINGS: Endoscopy and cannulation of the major papilla was performed by the gastroenterology service. Spot views are presented for interpretation. Contrast is injected in bile ducts. Following this, balloon sweep is performed     ERCP images as above. .  Please refer to the procedure report for further details. Scheduled Meds:   sodium chloride flush  5-40 mL IntraVENous 2 times per day    enoxaparin  40 mg SubCUTAneous Daily    acetaminophen  650 mg Oral Q6H    amLODIPine  10 mg Oral Daily    [Held by provider] apixaban  5 mg Oral BID    sodium chloride flush  10 mL IntraVENous 2 times per day    lactobacillus  1 capsule Oral BID WC    metoprolol succinate  25 mg Oral Daily    lisinopril  20 mg Oral Daily    atorvastatin  10 mg Oral Daily    piperacillin-tazobactam  3,375 mg IntraVENous Q8H     Continuous Infusions:   sodium chloride      sodium chloride 125 mL/hr at 10/08/22 4983    sodium chloride      sodium chloride 75 mL/hr at 10/06/22 2216     PRN Meds:.sodium chloride flush, sodium chloride, ondansetron **OR** ondansetron, morphine **OR** morphine, oxyCODONE **OR** oxyCODONE, sodium chloride flush, sodium chloride, acetaminophen **OR** acetaminophen, senna, ondansetron, morphine **OR** morphine      Assessment:  80 y.o. female admitted with   1. Acute cholecystitis    2.  Transaminitis        OR Date 10/7/2022, laparoscopic cholecystectomy for acute cholecystitis  Procedure Date 10/6/2022, ERCP with stone removal for choledocholithiasis  Atrial fibrillation  CHF  Medical coagulopathy, on Eliquis--currently held      Plan:  1. Pain fairly controlled, no nausea/vomiting but having belching, no bowel function, soft but mildly distended on exam, vitals stable, LFT/bilirubin decreasing, elevated WBC with decreased CO2, creatinine up to 1 from 0.6 with low urine output; continued supportive care  2. Regular diet as tolerated; monitor bowel function  3. IV hydration, decrease rate to 100 mL/hour; monitor and correct electrolytes  4. Antibiotics  5. Activity as tolerated  6. Pulmonary toilet, incentive spirometry  7. PRN analgesics and antiemetics--minimizing narcotics as tolerated, transition to PO, apply ICE  8. DVT prophylaxis with  Lovenox and SCD's; hold Eliquis  9. Management of medical comorbid etiologies per primary team and consulting services  10. Disposition: Not ready for discharge today    EDUCATION:  Educated patient on plan of care and disease process--all questions answered. Plans discussed with patient and nursing. Reviewed and discussed with Dr. Sonali Duncan. Signed:  TIM Rueda - CNP  10/8/2022 11:04 AM    Postop day #1 status post lap john  Complains of abdominal pain  Abdomen seems distended but appropriately tender  Will back diet down to clear liquids  Repeat labs in a.m.

## 2022-10-08 NOTE — PROGRESS NOTES
Gastroenterology Progress Note    Cori Bonilla is a 80 y.o. female patient. 1. Acute cholecystitis    2. Transaminitis        Admission Date: 10/4/2022  Hospital Day: Hospital Day: 5  Attending: Maggy Castano MD  Date of service: 10/8/22    SUBJECTIVE:    Feels ok. Belching a lot. Not had passed gas or BM  Abdominal pain worsens with movement     ROS:  Cardiovascular ROS: no chest pain or dyspnea on exertion  Gastrointestinal ROS: see above  Respiratory ROS: no cough, shortness of breath, or wheezing    Physical    VITALS:  BP (!) 97/46   Pulse 75   Temp 97.2 °F (36.2 °C) (Axillary)   Resp 20   Ht 5' 7\" (1.702 m)   Wt 147 lb 11.3 oz (67 kg)   LMP 1962 (Within Years)   SpO2 99%   BMI 23.13 kg/m²   TEMPERATURE:  Current - Temp: 97.2 °F (36.2 °C); Max - Temp  Av.7 °F (36.5 °C)  Min: 97.1 °F (36.2 °C)  Max: 98.1 °F (36.7 °C)    NAD  RRR, Nl s1s2  Lungs CTA Bilaterally, normal effort  Abdomen soft, mild tenderness LUQ, + laparoscopic incision sites  AAOx3, No asterixis     Data      CBC:   Recent Labs     10/05/22  0607 10/06/22  0448 10/07/22  0552   WBC 7.2 6.3 7.1   RBC 4.69 4.46 4.51   HGB 13.6 12.7 12.7   HCT 40.8 38.8 39.0    189 210   MCV 86.9 87.0 86.4   MCH 29.1 28.5 28.2   MCHC 33.4 32.8 32.6   RDW 14.2 15.0 14.5        BMP:  Recent Labs     10/05/22  0607 10/06/22  0448 10/07/22  05    139 140   K 3.7 3.4* 4.4    109 109   CO2 22 20* 22   BUN 9 10 14   CREATININE <0.5* <0.5* 0.6   CALCIUM 8.9 8.8 8.7   GLUCOSE 102* 85 107*        Hepatic Function Panel:   Recent Labs     10/05/22  0607 10/06/22  0448 10/07/22  0552   * 193* 117*   * 448* 352*   BILIDIR 4.2* 3.3* 1.2*   BILITOT 4.8* 3.8* 1.8*   ALKPHOS 315* 288* 284*       Recent Labs     10/05/22  0607 10/07/22  0552   LIPASE 51.0 25.0     Recent Labs     10/05/22  0607 10/06/22  0448   PROTIME 18.7* 16.2*   INR 1.57* 1.31*     No results for input(s): PTT in the last 72 hours.   No results for input(s): OCCULTBLD in the last 72 hours. Radiology Review:    FL ERCP BILIARY AND PANCREATIC S&I   Final Result   ERCP images as above. .  Please refer to the procedure report for further   details. MRI ABDOMEN W WO CONTRAST MRCP   Final Result   Findings suggestive of acute cholecystitis. No suspicious gallbladder mass   seen as suggested on prior ultrasound. Multiple gallstones are seen. There is a small filling defect seen in the distal common duct, either small   amount of sludge or tiny stone. CT ABDOMEN PELVIS W IV CONTRAST Additional Contrast? None   Final Result   Cholelithiasis. Gallbladder wall thickening, pericholecystic fat stranding   and trace fluid suggest acute cholecystitis. Mild to moderate common bile duct dilatation extending to the ampulla. Choledocholithiasis must be considered. Follow-up MRCP would be helpful in   further evaluation. Sigmoid colon diverticulosis. No evidence of diverticulitis. 2.5 cm left ovarian cyst.  Follow-up recommendation as below. RECOMMENDATIONS:   Managing Incidental Adnexal Cystic Mass by CT or MR      Late postmenopausal      < or equal to 3 cm: no follow up      >3 cm: US promptly      Reference:      Oscar Beauchamp al. Managing Incidental Findings on Abdominal CT: White Paper of   the ACR Incidental Findings Committee. J Am Lo Radiol 2010;7:754-773         US GALLBLADDER RUQ   Final Result   1. Cholelithiasis is identified, with minimal pericholecystic fluid. There   is also a focal area of masslike thickening and increased echogenicity as   well as posterior q.6 shadowing in a non dependent location involving the   fundus of the gallbladder. A gallbladder mass cannot be excluded. Recommend   further evaluation with dedicated CT imaging of the abdomen and pelvis with   contrast.   2. Dilation of the common bile duct. This can be assessed on CT imaging as   well.    3. There are cysts associated with the right kidney, as well as questionable   hydronephrosis. This can be assessed on CT imaging. FLUORO FOR SURGICAL PROCEDURES    (Results Pending)          Assessment:   Acute cholecystitis with choledocholithiasis    S/p lap john 10/7 and ERCP 10/6  LFTs trending down     Recommendations:   Post op care, per surgery   GI will sign off. Please call if you have questions.     Ann-Marie Lackey MD, MSc  [facility]  10/08/22

## 2022-10-08 NOTE — PROGRESS NOTES
Hospitalist Progress Note      PCP: No primary care provider on file. Date of Admission: 10/4/2022    Chief Complaint: Abdominal pain    Hospital Course: Presented with abdominal pain. Concerns noted for cholelithiasis and cholecystitis. GI and general surgery consulted. MRCP and ERCP performed. Laparoscopic cholecystectomy performed. Noted patient lethargic and hypoxic. Chest x-ray ordered. Not ready for discharge today    Subjective: Lethargic, hypoxia, no chest pain, no nausea or vomiting. Medications:  Reviewed    Infusion Medications    sodium chloride      sodium chloride      sodium chloride       Scheduled Medications    sodium chloride flush  5-40 mL IntraVENous 2 times per day    enoxaparin  40 mg SubCUTAneous Daily    acetaminophen  650 mg Oral Q6H    amLODIPine  10 mg Oral Daily    [Held by provider] apixaban  5 mg Oral BID    sodium chloride flush  10 mL IntraVENous 2 times per day    lactobacillus  1 capsule Oral BID WC    metoprolol succinate  25 mg Oral Daily    lisinopril  20 mg Oral Daily    atorvastatin  10 mg Oral Daily    piperacillin-tazobactam  3,375 mg IntraVENous Q8H     PRN Meds: sodium chloride flush, sodium chloride, ondansetron **OR** ondansetron, morphine **OR** morphine, oxyCODONE **OR** oxyCODONE, sodium chloride flush, sodium chloride, acetaminophen **OR** acetaminophen, senna, ondansetron, morphine **OR** morphine      Intake/Output Summary (Last 24 hours) at 10/8/2022 1248  Last data filed at 10/7/2022 2244  Gross per 24 hour   Intake 425 ml   Output 325 ml   Net 100 ml         Physical Exam Performed:    BP (!) 124/53   Pulse 73   Temp 97.4 °F (36.3 °C) (Oral)   Resp 18   Ht 5' 7\" (1.702 m)   Wt 147 lb 11.3 oz (67 kg)   LMP 09/01/1962 (Within Years)   SpO2 90%   BMI 23.13 kg/m²     General appearance: No apparent distress, appears stated age and cooperative. HEENT: Pupils equal, round, and reactive to light. Conjunctivae/corneas clear.   Neck: Supple, with full range of motion. No jugular venous distention. Trachea midline. Respiratory: Poor breathing effort, no crackles or wheezes  Cardiovascular: Regular rate and rhythm with normal S1/S2 without murmurs, rubs or gallops. Abdomen: Soft, appropriate postop tender  Musculoskeletal: No clubbing, cyanosis or edema bilaterally. Full range of motion without deformity. Skin: Skin color, texture, turgor normal.  No rashes or lesions. Neurologic:  Neurovascularly intact without any focal sensory/motor deficits. Cranial nerves: II-XII intact, grossly non-focal.  Psychiatric: Sleeping, lethargic when attempting to wake up. Capillary Refill: Brisk, 3 seconds, normal   Peripheral Pulses: +2 palpable, equal bilaterally     I examined the patient today (10/08/22). Physical exam is not significantly different compared to yesterday (10/06). Labs:   Recent Labs     10/06/22  0448 10/07/22  0552 10/08/22  0714   WBC 6.3 7.1 16.7*   HGB 12.7 12.7 10.2*   HCT 38.8 39.0 31.5*    210 266       Recent Labs     10/06/22  0448 10/07/22  0552 10/08/22  0713    140 139   K 3.4* 4.4 4.6    109 108   CO2 20* 22 19*   BUN 10 14 31*   CREATININE <0.5* 0.6 1.0   CALCIUM 8.8 8.7 8.5       Recent Labs     10/06/22  0448 10/07/22  0552 10/08/22  0713   * 117* 112*   * 352* 295*   BILIDIR 3.3* 1.2* 0.9*   BILITOT 3.8* 1.8* 1.5*   ALKPHOS 288* 284* 241*       Recent Labs     10/06/22  0448   INR 1.31*       No results for input(s): Gallo Mcgovern in the last 72 hours. Urinalysis:      Lab Results   Component Value Date/Time    NITRU Negative 10/05/2022 02:00 AM    WBCUA 6-9 10/05/2022 02:00 AM    BACTERIA 1+ 10/05/2022 02:00 AM    RBCUA 0-2 10/05/2022 02:00 AM    BLOODU Negative 10/05/2022 02:00 AM    SPECGRAV >=1.030 10/05/2022 02:00 AM    GLUCOSEU Negative 10/05/2022 02:00 AM       Radiology:  FL ERCP BILIARY AND PANCREATIC S&I   Final Result   ERCP images as above. .  Please refer to the procedure report for further   details. MRI ABDOMEN W WO CONTRAST MRCP   Final Result   Findings suggestive of acute cholecystitis. No suspicious gallbladder mass   seen as suggested on prior ultrasound. Multiple gallstones are seen. There is a small filling defect seen in the distal common duct, either small   amount of sludge or tiny stone. CT ABDOMEN PELVIS W IV CONTRAST Additional Contrast? None   Final Result   Cholelithiasis. Gallbladder wall thickening, pericholecystic fat stranding   and trace fluid suggest acute cholecystitis. Mild to moderate common bile duct dilatation extending to the ampulla. Choledocholithiasis must be considered. Follow-up MRCP would be helpful in   further evaluation. Sigmoid colon diverticulosis. No evidence of diverticulitis. 2.5 cm left ovarian cyst.  Follow-up recommendation as below. RECOMMENDATIONS:   Managing Incidental Adnexal Cystic Mass by CT or MR      Late postmenopausal      < or equal to 3 cm: no follow up      >3 cm: US promptly      Reference:      [place] al. Managing Incidental Findings on Abdominal CT: White Paper of   the ACR Incidental Findings Committee. J Am Lo Radiol 2010;7:754-773         US GALLBLADDER RUQ   Final Result   1. Cholelithiasis is identified, with minimal pericholecystic fluid. There   is also a focal area of masslike thickening and increased echogenicity as   well as posterior q.6 shadowing in a non dependent location involving the   fundus of the gallbladder. A gallbladder mass cannot be excluded. Recommend   further evaluation with dedicated CT imaging of the abdomen and pelvis with   contrast.   2. Dilation of the common bile duct. This can be assessed on CT imaging as   well. 3. There are cysts associated with the right kidney, as well as questionable   hydronephrosis. This can be assessed on CT imaging.          FLUORO FOR SURGICAL PROCEDURES    (Results Pending)   XR CHEST PORTABLE    (Results Pending)           Assessment/Plan:    Active Hospital Problems    Diagnosis     Acute cholecystitis [K81.0]      Priority: Medium     PLAN:    Acute cholecystitis  CT scan of the abdomen showed CBD dilatation. Laparoscopic cholecystectomy performed yesterday. Hypoxia  Was hypoxic postoperatively and cannot be weaned off oxygen. Room air at baseline. Chest x-ray ordered. Paroxysmal atrial fibrillation  Heart rate is controlled. Continue metoprolol. Eliquis on hold for procedure. Continue to hold for now. Chronic diastolic congestive heart failure  Appears stable. Chest x-ray ordered to determine the need for diuresis    Hypertension  Blood pressure better at this time, remains within normal limits    Discussed with nursing    DVT Prophylaxis: Eliquis on hold  Diet: ADULT DIET; Clear Liquid  Code Status: Full Code  PT/OT Eval Status: Consider when plans are clarified    Dispo -not ready for discharge because of postoperative hypoxia.       Kofi Thornton MD

## 2022-10-08 NOTE — PROGRESS NOTES
Incentive Spirometry education and demonstration completed by Respiratory Therapy Yes      Response to education: Very Good     Teaching Time: 5 minutes    Minimum Predicted Vital Capacity - 661 mL. Patient's Actual Vital Capacity - 1000 mL. Turning over to Nursing for routine follow-up Yes.     Comments:     Electronically signed by Cyndee Kaur RCP on 10/8/2022 at 3:53 PM  22

## 2022-10-08 NOTE — OP NOTE
[address]                     [address] [facility], [address] [place] [address]                                OPERATIVE REPORT    PATIENT NAME: Bethanie Troy                    :        1931  MED REC NO:   [id number]                          ROOM:       1  ACCOUNT NO:   [de-identified]                           ADMIT DATE: 10/04/2022  PROVIDER:     Karla Ivan MD    DATE OF PROCEDURE:  10/07/2022    PREOPERATIVE DIAGNOSIS:  Acute cholecystitis. POSTOPERATIVE DIAGNOSIS:  Acute cholecystitis. OPERATION PERFORMED:  Laparoscopic cholecystectomy. SURGEON:  Karla Ivan MD    ANESTHESIA:  General.    INDICATIONS:  This is a 44-year-old female who presented to [facility] with choledocholithiasis and cholecystitis. She underwent  ERCP yesterday with clearance for the common duct and today is ready for  cholecystectomy. The risks, benefits, and alternative treatments of the  laparoscopic cholecystectomy with cholangiogram were discussed. Details  of the procedure were discussed. All questions were answered. The  patient understood, agreed, and wished to proceed. OPERATIVE PROCEDURE:  The patient was brought to the operating suite and  laid in the supine position. General anesthesia was induced and well  tolerated. The patient's abdomen was prepped and draped in the usual  sterile fashion. After a proper time-out was performed, verifying the  operative site, the procedure, and the patient, a supraumbilical  incision was made with a scalpel. Using an open Louis technique, the  fascia was elevated with two interrupted 0 Vicryl sutures. The fascia  was then divided with electrocautery and the peritoneum with blunt  dissection. The Louis was then placed into the abdominal cavity under  direction vision. The peritoneal cavity was then insufflated with  carbon dioxide to a pressure of 15 mmHg, which was well tolerated.    Additional trocars were then placed in the following locations: A 5 mm  in the epigastric and two 5 mm in the right subcostal.  The epigastric  was later upgraded to a 12 mm to allow for larger instrumentation. The  gallbladder was noticed to be tense and inflamed and had omentum and duodenum that was  with a very careful blunt  dissection. The dome was then grasped and retracted cephalad. The neck  of the gallbladder was grasped and retracted laterally, exposing the  Calot's triangle. The gallbladder was noticed to have a very large  amount of inflammation around the Calot's triangle. The  cystic duct,  the cystic artery, and the neck of the gallbladder were all clearly  identified and dissected circumferentially, exposing the critical view  of safety. One duct was clearly seen entering the gallbladder. This  was large but there were no other ducts entering. This must be a large  cystic duct. The cystic artery was clipped and divided and there was  pulsatile movement on the proximal stump confirming that this was the  artery. The cystic duct was too large to be clipped and had to use a  stapler to divide it safely. I felt it was too short to safely close  with an Endoloop and risked losing the cystic duct stump. The epigastric was  upgraded to 12 and the stapler was inserted and the cystic duct divided. The peritoneal attachments between the liver and gallbladder were lysed  with electrocautery. The gallbladder was then removed from its bed,  placed into an Endobag, removed through one of the trocars, and placed  on the table as specimen. The gallbladder fossa was copiously visually  irrigated and suctioned until the irrigant was clear. There was a small  amount of inflamed raw tissue at the gallbladder fossa and a packet of  powderized Surgicel was sprayed to assist with hemostasis. A EyeIC  suture passer was used to pass the PDS suture through the epigastric and  periumbilical fascial defect with two interrupted sutures. The abdomen  was then allowed to desufflate and all trocars were then removed under  direct vision. Local anesthetic was injected in the wounds and all skin  incisions were closed with 4-0 suture. The wounds were then cleaned and  dressed with Dermabond. The patient tolerated the procedure well and  was transferred to the PACU in stable condition. SPECIMENS:  Gallbladder. DRAINS:  None. COMPLICATIONS:  None. ESTIMATED BLOOD LOSS:  Less than 50 ml.         Nicolas Becerra MD    D: 10/07/2022 17:21:23       T: 10/08/2022 4:45:54     [email or web address]  Job#: [id number]     Doc#: [id number]    CC:

## 2022-10-08 NOTE — PLAN OF CARE
Plan of care reviewed. Reports no questions. Pt remains free from falls. Bed alarm engaged. Call light in reach.  Fall risk armband on pt wrist.

## 2022-10-09 LAB
A/G RATIO: 1.2 (ref 1.1–2.2)
ALBUMIN SERPL-MCNC: 2.7 G/DL (ref 3.4–5)
ALP BLD-CCNC: 166 U/L (ref 40–129)
ALT SERPL-CCNC: 204 U/L (ref 10–40)
ANION GAP SERPL CALCULATED.3IONS-SCNC: 11 MMOL/L (ref 3–16)
AST SERPL-CCNC: 68 U/L (ref 15–37)
BASOPHILS ABSOLUTE: 0 K/UL (ref 0–0.2)
BASOPHILS RELATIVE PERCENT: 0.1 %
BILIRUB SERPL-MCNC: 1.2 MG/DL (ref 0–1)
BUN BLDV-MCNC: 46 MG/DL (ref 7–20)
CALCIUM SERPL-MCNC: 8.3 MG/DL (ref 8.3–10.6)
CHLORIDE BLD-SCNC: 109 MMOL/L (ref 99–110)
CO2: 19 MMOL/L (ref 21–32)
CREAT SERPL-MCNC: 1.9 MG/DL (ref 0.6–1.2)
EOSINOPHILS ABSOLUTE: 0 K/UL (ref 0–0.6)
EOSINOPHILS RELATIVE PERCENT: 0 %
GFR AFRICAN AMERICAN: 30
GFR NON-AFRICAN AMERICAN: 25
GLUCOSE BLD-MCNC: 131 MG/DL (ref 70–99)
HCT VFR BLD CALC: 25.3 % (ref 36–48)
HEMOGLOBIN: 8.2 G/DL (ref 12–16)
LYMPHOCYTES ABSOLUTE: 1.1 K/UL (ref 1–5.1)
LYMPHOCYTES RELATIVE PERCENT: 8.7 %
MCH RBC QN AUTO: 28.5 PG (ref 26–34)
MCHC RBC AUTO-ENTMCNC: 32.5 G/DL (ref 31–36)
MCV RBC AUTO: 87.7 FL (ref 80–100)
MONOCYTES ABSOLUTE: 1.5 K/UL (ref 0–1.3)
MONOCYTES RELATIVE PERCENT: 11.5 %
NEUTROPHILS ABSOLUTE: 10.1 K/UL (ref 1.7–7.7)
NEUTROPHILS RELATIVE PERCENT: 79.7 %
PDW BLD-RTO: 15 % (ref 12.4–15.4)
PLATELET # BLD: 185 K/UL (ref 135–450)
PMV BLD AUTO: 8.5 FL (ref 5–10.5)
POTASSIUM SERPL-SCNC: 4.2 MMOL/L (ref 3.5–5.1)
RBC # BLD: 2.89 M/UL (ref 4–5.2)
SODIUM BLD-SCNC: 139 MMOL/L (ref 136–145)
TOTAL PROTEIN: 5 G/DL (ref 6.4–8.2)
WBC # BLD: 12.6 K/UL (ref 4–11)

## 2022-10-09 PROCEDURE — 36415 COLL VENOUS BLD VENIPUNCTURE: CPT

## 2022-10-09 PROCEDURE — 2580000003 HC RX 258: Performed by: SURGERY

## 2022-10-09 PROCEDURE — 99223 1ST HOSP IP/OBS HIGH 75: CPT | Performed by: INTERNAL MEDICINE

## 2022-10-09 PROCEDURE — APPNB30 APP NON BILLABLE TIME 0-30 MINS: Performed by: NURSE PRACTITIONER

## 2022-10-09 PROCEDURE — 94761 N-INVAS EAR/PLS OXIMETRY MLT: CPT

## 2022-10-09 PROCEDURE — APPSS15 APP SPLIT SHARED TIME 0-15 MINUTES: Performed by: NURSE PRACTITIONER

## 2022-10-09 PROCEDURE — 6370000000 HC RX 637 (ALT 250 FOR IP): Performed by: SURGERY

## 2022-10-09 PROCEDURE — 1200000000 HC SEMI PRIVATE

## 2022-10-09 PROCEDURE — 2700000000 HC OXYGEN THERAPY PER DAY

## 2022-10-09 PROCEDURE — 2580000003 HC RX 258: Performed by: NURSE PRACTITIONER

## 2022-10-09 PROCEDURE — 80053 COMPREHEN METABOLIC PANEL: CPT

## 2022-10-09 PROCEDURE — 6360000002 HC RX W HCPCS: Performed by: SURGERY

## 2022-10-09 PROCEDURE — 85025 COMPLETE CBC W/AUTO DIFF WBC: CPT

## 2022-10-09 PROCEDURE — 99024 POSTOP FOLLOW-UP VISIT: CPT | Performed by: SURGERY

## 2022-10-09 RX ADMIN — SENNOSIDES 8.6 MG: 8.6 TABLET, FILM COATED ORAL at 08:11

## 2022-10-09 RX ADMIN — Medication 10 ML: at 19:35

## 2022-10-09 RX ADMIN — ATORVASTATIN CALCIUM 10 MG: 10 TABLET, FILM COATED ORAL at 08:05

## 2022-10-09 RX ADMIN — SODIUM CHLORIDE: 9 INJECTION, SOLUTION INTRAVENOUS at 11:21

## 2022-10-09 RX ADMIN — Medication 1 CAPSULE: at 18:31

## 2022-10-09 RX ADMIN — METOPROLOL SUCCINATE 25 MG: 25 TABLET, EXTENDED RELEASE ORAL at 08:05

## 2022-10-09 RX ADMIN — Medication 10 ML: at 08:05

## 2022-10-09 RX ADMIN — ACETAMINOPHEN 650 MG: 325 TABLET ORAL at 14:26

## 2022-10-09 RX ADMIN — PIPERACILLIN AND TAZOBACTAM 3375 MG: 3; .375 INJECTION, POWDER, FOR SOLUTION INTRAVENOUS at 14:33

## 2022-10-09 RX ADMIN — ENOXAPARIN SODIUM 40 MG: 100 INJECTION SUBCUTANEOUS at 08:05

## 2022-10-09 RX ADMIN — PIPERACILLIN AND TAZOBACTAM 3375 MG: 3; .375 INJECTION, POWDER, FOR SOLUTION INTRAVENOUS at 06:08

## 2022-10-09 RX ADMIN — OXYCODONE 5 MG: 5 TABLET ORAL at 08:11

## 2022-10-09 RX ADMIN — Medication 1 CAPSULE: at 08:05

## 2022-10-09 RX ADMIN — SODIUM CHLORIDE: 9 INJECTION, SOLUTION INTRAVENOUS at 00:51

## 2022-10-09 RX ADMIN — ACETAMINOPHEN 650 MG: 325 TABLET ORAL at 18:31

## 2022-10-09 RX ADMIN — Medication 10 ML: at 19:34

## 2022-10-09 ASSESSMENT — PAIN SCALES - GENERAL
PAINLEVEL_OUTOF10: 3
PAINLEVEL_OUTOF10: 4
PAINLEVEL_OUTOF10: 3

## 2022-10-09 NOTE — PROGRESS NOTES
Hospitalist Progress Note      PCP: No primary care provider on file. Date of Admission: 10/4/2022    Chief Complaint: Abdominal pain    Hospital Course: Presented with abdominal pain. Concerns noted for cholelithiasis and cholecystitis. GI and general surgery consulted. MRCP and ERCP performed. Laparoscopic cholecystectomy performed. Patient is hypoxic and lethargic postoperatively. Chest x-ray shows pleural effusion. Also noted creatinine 1.9 today which is higher than 1.0 yesterday. Subjective: More alert compared to yesterday. Able to tolerate clear liquids. States she has some soreness at the surgical site. Mild dyspnea, no chest pain. No nausea or vomiting.       Medications:  Reviewed    Infusion Medications    sodium chloride 100 mL/hr at 10/09/22 0051    sodium chloride      sodium chloride       Scheduled Medications    sodium chloride flush  5-40 mL IntraVENous 2 times per day    enoxaparin  40 mg SubCUTAneous Daily    acetaminophen  650 mg Oral Q6H    amLODIPine  10 mg Oral Daily    [Held by provider] apixaban  5 mg Oral BID    sodium chloride flush  10 mL IntraVENous 2 times per day    lactobacillus  1 capsule Oral BID WC    metoprolol succinate  25 mg Oral Daily    atorvastatin  10 mg Oral Daily    piperacillin-tazobactam  3,375 mg IntraVENous Q8H     PRN Meds: sodium chloride flush, sodium chloride, ondansetron **OR** ondansetron, morphine **OR** morphine, oxyCODONE **OR** oxyCODONE, sodium chloride flush, sodium chloride, acetaminophen **OR** acetaminophen, senna, ondansetron, morphine **OR** morphine      Intake/Output Summary (Last 24 hours) at 10/9/2022 1045  Last data filed at 10/9/2022 0608  Gross per 24 hour   Intake 1755.01 ml   Output 200 ml   Net 1555.01 ml         Physical Exam Performed:    BP (!) 101/59   Pulse 69   Temp 97.6 °F (36.4 °C) (Oral)   Resp 16   Ht 5' 7\" (1.702 m)   Wt 159 lb 2.8 oz (72.2 kg) Comment: bed zeroed  LMP 09/01/1962 (Within Years) SpO2 93%   BMI 24.93 kg/m²     General appearance: No apparent distress, appears stated age and cooperative. HEENT: Pupils equal, round, and reactive to light. Conjunctivae/corneas clear. Neck: Supple, with full range of motion. No jugular venous distention. Trachea midline. Respiratory: Poor breathing effort, no crackles or wheezes. Bibasilar decreased breath sounds  Cardiovascular: Regular rate and rhythm with normal S1/S2 without murmurs, rubs or gallops. Abdomen: Soft, appropriate postop tenderness with no rebound tenderness. Musculoskeletal: No clubbing, cyanosis or edema bilaterally. Full range of motion without deformity. Skin: Skin color, texture, turgor normal.  No rashes or lesions. Neurologic:  Neurovascularly intact without any focal sensory/motor deficits. Cranial nerves: II-XII intact, grossly non-focal.  Psychiatric: Sleeping, lethargic when attempting to wake up. Capillary Refill: Brisk, 3 seconds, normal   Peripheral Pulses: +2 palpable, equal bilaterally           Labs:   Recent Labs     10/07/22  0552 10/08/22  0714 10/09/22  0537   WBC 7.1 16.7* 12.6*   HGB 12.7 10.2* 8.2*   HCT 39.0 31.5* 25.3*    266 185       Recent Labs     10/07/22  0552 10/08/22  0713 10/09/22  0537    139 139   K 4.4 4.6 4.2    108 109   CO2 22 19* 19*   BUN 14 31* 46*   CREATININE 0.6 1.0 1.9*   CALCIUM 8.7 8.5 8.3       Recent Labs     10/07/22  0552 10/08/22  0713 10/09/22  0537   * 112* 68*   * 295* 204*   BILIDIR 1.2* 0.9*  --    BILITOT 1.8* 1.5* 1.2*   ALKPHOS 284* 241* 166*       No results for input(s): INR in the last 72 hours. No results for input(s): [place] in the last 72 hours.     Urinalysis:      Lab Results   Component Value Date/Time    NITRU Negative 10/05/2022 02:00 AM    WBCUA 6-9 10/05/2022 02:00 AM    BACTERIA 1+ 10/05/2022 02:00 AM    RBCUA 0-2 10/05/2022 02:00 AM    BLOODU Negative 10/05/2022 02:00 AM    SPECGRAV >=1.030 10/05/2022 02:00 AM GLUCOSEU Negative 10/05/2022 02:00 AM       Radiology:  XR CHEST PORTABLE   Final Result   Moderate-sized right basilar pleural effusion with atelectasis. Mild left basilar atelectasis. FL ERCP BILIARY AND PANCREATIC S&I   Final Result   ERCP images as above. .  Please refer to the procedure report for further   details. MRI ABDOMEN W WO CONTRAST MRCP   Final Result   Findings suggestive of acute cholecystitis. No suspicious gallbladder mass   seen as suggested on prior ultrasound. Multiple gallstones are seen. There is a small filling defect seen in the distal common duct, either small   amount of sludge or tiny stone. CT ABDOMEN PELVIS W IV CONTRAST Additional Contrast? None   Final Result   Cholelithiasis. Gallbladder wall thickening, pericholecystic fat stranding   and trace fluid suggest acute cholecystitis. Mild to moderate common bile duct dilatation extending to the ampulla. Choledocholithiasis must be considered. Follow-up MRCP would be helpful in   further evaluation. Sigmoid colon diverticulosis. No evidence of diverticulitis. 2.5 cm left ovarian cyst.  Follow-up recommendation as below. RECOMMENDATIONS:   Managing Incidental Adnexal Cystic Mass by CT or MR      Late postmenopausal      < or equal to 3 cm: no follow up      >3 cm: US promptly      Reference:      Zeus Ferrari al. Managing Incidental Findings on Abdominal CT: White Paper of   the ACR Incidental Findings Committee. J Am Lo Radiol 2010;7:754-773         US GALLBLADDER RUQ   Final Result   1. Cholelithiasis is identified, with minimal pericholecystic fluid. There   is also a focal area of masslike thickening and increased echogenicity as   well as posterior q.6 shadowing in a non dependent location involving the   fundus of the gallbladder. A gallbladder mass cannot be excluded.   Recommend   further evaluation with dedicated CT imaging of the abdomen and pelvis with   contrast. 2. Dilation of the common bile duct. This can be assessed on CT imaging as   well. 3. There are cysts associated with the right kidney, as well as questionable   hydronephrosis. This can be assessed on CT imaging. FLUORO FOR SURGICAL PROCEDURES    (Results Pending)           Assessment/Plan:    Active Hospital Problems    Diagnosis     Acute cholecystitis [K81.0]      Priority: Medium     PLAN:    Acute cholecystitis  CT scan of the abdomen showed CBD dilatation. Laparoscopic cholecystectomy performed. Tolerated well but postoperatively hypoxic and noted increased creatinine    Hypoxia  Was hypoxic postoperatively and cannot be weaned off oxygen. Room air at baseline. Chest x-ray shows pleural effusion. Remains on 2 L oxygen    Pleural effusion  Acute. Pulmonology consult requested    Acute renal failure  Nephrology consulted. Lisinopril stopped. Noted patient borderline hypotensive. Paroxysmal atrial fibrillation  Heart rate is controlled. Continue metoprolol. Eliquis on hold for procedure. Continue to hold for now given hemoglobin decreased overnight. Chronic diastolic congestive heart failure  Unclear if pleural effusion is caused by CHF. Given increasing creatinine, I would be hesitant to diurese. Nephrology opinion will be needed. Hypertension  Blood pressure on the low side. Lisinopril stopped also because of acute kidney injury    Anemia of postoperative blood loss  Monitor hemoglobin and transfuse if indicated. Today's hemoglobin is not an indication for PRBC transfusion    Discussed with nursing    Discussed with patient    DVT Prophylaxis: Eliquis on hold  Diet: ADULT DIET; Clear Liquid  Code Status: Full Code  PT/OT Eval Status: Consider when plans are clarified    Dispo -not ready for discharge and discharge date is currently unclear.   Worsening      Torrie Grajeda MD

## 2022-10-09 NOTE — CONSULTS
[facility]   Nephrology progress Note  [phone]   [facility]           Reason for Consult:  JOHN , date of admission 10/4/2022    HISTORY OF PRESENT ILLNESS:      The patient is a 80 y.o. female with significant past medical history of hypertension, diastolic CHF, atrial fibrillation, gallstones removed by ERCP on 10/6/2022 followed by laparoscopic cholecystectomy on 10/7/2022,      She has no history of kidney disease kidney stone disease. Her serum creatinine on 10/8/2022 was 1 with a bun of 31 today the serum creatinine was elevated at 1.9 with a bun of 46 bicarb is 19. She is on antibiotic Zosyn, she is not on a ACE inhibitor/ARB or NSAID  Per records and nurse she has not had NSAIDs recently. Prior to admission she is appropriately on ACE inhibitor lisinopril for her blood pressure  There is been no hypotension  She has been on IV fluids,  oral intake has been poor  She did have a CT scan with IV contrast on 10/5/2022, the renal function decline started between 10/7 and 10/8/2022      Past Medical History:        Diagnosis Date    Atrial fibrillation ([facility] [address]     Diastolic CHF ([facility] [address]     Hypertension        Past Surgical History:        Procedure Laterality Date    ERCP N/A 10/6/2022    ERCP STONE REMOVAL performed by Conor Hamm MD at [address] [place] [address]       Current Medications:    No current facility-administered medications on file prior to encounter.      Current Outpatient Medications on File Prior to Encounter   Medication Sig Dispense Refill    Apixaban (ELIQUIS PO) Take 5 mg by mouth in the morning and at bedtime      amLODIPine (NORVASC) 5 MG tablet Take 1 tablet by mouth daily      atorvastatin (LIPITOR) 10 MG tablet Take 1 tablet by mouth at bedtime      lisinopril (PRINIVIL;ZESTRIL) 20 MG tablet Take 1 tablet by mouth daily      metoprolol succinate (TOPROL XL) 25 MG extended release tablet Take 1 tablet by mouth daily         Allergies:  Patient has no known allergies. Social History:    Social History     Socioeconomic History    Marital status: Unknown     Spouse name: Not on file    Number of children: Not on file    Years of education: Not on file    Highest education level: Not on file   Occupational History    Not on file   Tobacco Use    Smoking status: Never    Smokeless tobacco: Not on file   Substance and Sexual Activity    Alcohol use: Not Currently    Drug use: Never    Sexual activity: Not on file   Other Topics Concern    Not on file   Social History Narrative    Not on file     Social Determinants of Health     Financial Resource Strain: Not on file   Food Insecurity: Not on file   Transportation Needs: Not on file   Physical Activity: Not on file   Stress: Not on file   Social Connections: Not on file   Intimate Partner Violence: Not on file   Housing Stability: Not on file       Family History:   History reviewed. No pertinent family history. REVIEW OF SYSTEMS:      10 pt ROS done, relevant features as in [place], rest negative       PHYSICAL EXAM:    Vitals:    BP (!) 101/59   Pulse 69   Temp 97.6 °F (36.4 °C) (Oral)   Resp 16   Ht 5' 7\" (1.702 m)   Wt 159 lb 2.8 oz (72.2 kg) Comment: bed zeroed  LMP 09/01/1962 (Within Years)   SpO2 93%   BMI 24.93 kg/m²   I/O last 3 completed shifts: In: 4066 [I.V.:1719.5; IV Piggyback:35.5]  Out: 500 [Urine:500]  No intake/output data recorded. Physical Exam:  Gen:  alert, oriented x 3, frail   PERRL , EOM +  Pallor +, No icterus  JVP not raised   CV: RRR no murmur or rub . Lungs:B/ L air entry, Normal breath sounds , no dullness to percussion either bases  Abd: soft, bowel sounds + , No organomegaly , post op ( laproscopic )  Ext: No edema, no cyanosis  Skin: Warm.   No rash  Neuro: no focal defiict      DATA:    CBC with Differential:    Lab Results   Component Value Date/Time    WBC 12.6 10/09/2022 05:37 AM    RBC 2.89 10/09/2022 05:37 AM    HGB 8.2 10/09/2022 05:37 AM    HCT 25.3 10/09/2022 05:37 AM     10/09/2022 05:37 AM    MCV 87.7 10/09/2022 05:37 AM    MCH 28.5 10/09/2022 05:37 AM    MCHC 32.5 10/09/2022 05:37 AM    RDW 15.0 10/09/2022 05:37 AM    LYMPHOPCT 8.7 10/09/2022 05:37 AM    MONOPCT 11.5 10/09/2022 05:37 AM    BASOPCT 0.1 10/09/2022 05:37 AM    MONOSABS 1.5 10/09/2022 05:37 AM    LYMPHSABS 1.1 10/09/2022 05:37 AM    EOSABS 0.0 10/09/2022 05:37 AM    BASOSABS 0.0 10/09/2022 05:37 AM     CMP:    Lab Results   Component Value Date/Time     10/09/2022 05:37 AM    K 4.2 10/09/2022 05:37 AM     10/09/2022 05:37 AM    CO2 19 10/09/2022 05:37 AM    BUN 46 10/09/2022 05:37 AM    CREATININE 1.9 10/09/2022 05:37 AM    GFRAA 30 10/09/2022 05:37 AM    AGRATIO 1.2 10/09/2022 05:37 AM    LABGLOM 25 10/09/2022 05:37 AM    GLUCOSE 131 10/09/2022 05:37 AM    PROT 5.0 10/09/2022 05:37 AM    LABALBU 2.7 10/09/2022 05:37 AM    CALCIUM 8.3 10/09/2022 05:37 AM    BILITOT 1.2 10/09/2022 05:37 AM    ALKPHOS 166 10/09/2022 05:37 AM    AST 68 10/09/2022 05:37 AM     10/09/2022 05:37 AM     Magnesium:    Lab Results   Component Value Date/Time    MG 1.80 10/05/2022 06:07 AM     Phosphorus:    Lab Results   Component Value Date/Time    PHOS 2.9 10/05/2022 06:07 AM     Uric Acid:  No results found for: LABURIC, URICACID  Last 3 Troponin:  No results found for: TROPONINI  U/A:    Lab Results   Component Value Date/Time    COLORU Yellow 10/05/2022 02:00 AM    PROTEINU Negative 10/05/2022 02:00 AM    PHUR 6.5 10/05/2022 02:00 AM    WBCUA 6-9 10/05/2022 02:00 AM    RBCUA 0-2 10/05/2022 02:00 AM    BACTERIA 1+ 10/05/2022 02:00 AM    CLARITYU Clear 10/05/2022 02:00 AM    SPECGRAV >=1.030 10/05/2022 02:00 AM    LEUKOCYTESUR LARGE 10/05/2022 02:00 AM    UROBILINOGEN 1.0 10/05/2022 02:00 AM    BILIRUBINUR Negative 10/05/2022 02:00 AM    BLOODU Negative 10/05/2022 02:00 AM    GLUCOSEU Negative 10/05/2022 02:00 AM           IMPRESSION/RECOMMENDATIONS:      JOHN : The reason for this is unclear at this time. Contrast nephropathy( CT scan 10/5/22)  is a possibility; as her serum creatinine baseline is 0.6, it was 1 on 10 8 and is 1.9 today. She denies any problems with urination. Her p.o. intake has been poor, no recent rash no current use of NSAIDs  Urine analysis done on 10/5/2022 shows no proteinuria or hematuria  We will recheck that  Check fractional excretion of sodium  Monitor renal function  Continue IV fluids    Thank you for allowing me to participate in the care of this patient. I will continue to follow along. Please call with questions.     Barbara Norton MD, MD  10/9/2022  [facility]

## 2022-10-09 NOTE — PROGRESS NOTES
[facility]        Progress Note    Patient Name: Dio Hawk  MRN: [id number]  YOB: 1931  Date of Evaluation: 10/9/2022    Postoperative Day #2    Subjective:  No acute events overnight  Pain better controlled  No nausea or vomiting, reports belching, tolerating clear liquids  Passing some flatus, no stool  Resting in bed at this time      Vital Signs:  Patient Vitals for the past 24 hrs:   BP Temp Temp src Pulse Resp SpO2 Weight   10/09/22 0817 -- -- -- -- -- 93 % --   10/09/22 0815 -- -- -- -- -- (!) 86 % --   10/09/22 08 -- -- -- 69 -- -- --   10/09/22 08 (!) 101/59 97.6 °F (36.4 °C) Oral 68 16 98 % --   10/09/22 0346 -- -- -- 63 -- -- --   10/09/22 034 -- -- -- -- -- -- 159 lb 2.8 oz (72.2 kg)   10/09/22 0325 106/62 97.3 °F (36.3 °C) Oral 67 16 98 % --   10/09/22 0008 (!) 94/56 97.4 °F (36.3 °C) Axillary 77 16 95 % --   10/08/22 2344 -- -- -- 55 -- -- --   10/08/22 2023 (!) 103/57 97.7 °F (36.5 °C) Axillary 71 16 96 % --   10/08/22 1809 -- -- -- 69 -- -- --   10/08/22 1800 (!) 100/46 97.7 °F (36.5 °C) Oral 68 18 97 % --   10/08/22 1600 -- -- -- 75 -- -- --   10/08/22 1552 -- -- -- 74 18 92 % --   10/08/22 1400 -- -- -- 75 -- -- --   10/08/22 1200 -- -- -- 74 -- -- --        TEMPERATURE HISTORY 24H: Temp (24hrs), Av.5 °F (36.4 °C), Min:97.3 °F (36.3 °C), Max:97.7 °F (36.5 °C)    BLOOD PRESSURE HISTORY: Systolic (29EUV), PSQ:071 , Min:90 , QZW:348    Diastolic (08JGJ), DHY:88, Min:44, Max:62      Intake/Output:  I/O last 3 completed shifts: In: 6085 [I.V.:1719.5; IV Piggyback:35.5]  Out: 500 [Urine:500]  No intake/output data recorded.   Drain/tube Output:       Physical Exam:  General: awake, alert, oriented to person, place, time  Lungs: unlabored respirations  Abdomen: soft, mildly distended, incisional tenderness only, bowel sounds present  Skin/Wound: healing well, no drainage, no erythema, ecchymosis noted, well approximated    Labs:  CBC:    Recent Labs     10/07/22  0552 10/08/22  0714 10/09/22  0537   WBC 7.1 16.7* 12.6*   HGB 12.7 10.2* 8.2*   HCT 39.0 31.5* 25.3*    266 185       BMP:    Recent Labs     10/07/22  0552 10/08/22  0713 10/09/22  0537    139 139   K 4.4 4.6 4.2    108 109   CO2 22 19* 19*   BUN 14 31* 46*   CREATININE 0.6 1.0 1.9*   GLUCOSE 107* 180* 131*       Hepatic:    Recent Labs     10/07/22  0552 10/08/22  0713 10/09/22  0537   * 112* 68*   * 295* 204*   BILITOT 1.8* 1.5* 1.2*   ALKPHOS 284* 241* 166*       Amylase:  No results found for: AMYLASE  Lipase:    Lab Results   Component Value Date/Time    LIPASE 25.0 10/07/2022 05:52 AM    LIPASE 51.0 10/05/2022 06:07 AM    LIPASE 38.0 10/04/2022 08:09 PM        Mag:    Lab Results   Component Value Date/Time    MG 1.80 10/05/2022 06:07 AM     Phos:     Lab Results   Component Value Date/Time    PHOS 2.9 10/05/2022 06:07 AM      Coags:   Lab Results   Component Value Date/Time    PROTIME 16.2 10/06/2022 04:48 AM    INR 1.31 10/06/2022 04:48 AM    APTT 34.8 10/05/2022 06:07 AM       Cultures:  Anaerobic culture  No results found for: LABANAE  Fungus stain  No results found for requested labs within last 30 days. Gram stain  No results found for requested labs within last 30 days. Organism  No results found for: [facility]  Surgical culture  No results found for: CXSURG  Blood culture 1  No results found for requested labs within last 30 days. Blood culture 2  No results found for requested labs within last 30 days. Fecal occult  No results found for requested labs within last 30 days. GI bacterial pathogens by PCR  No results found for requested labs within last 30 days. C. difficile  No results found for requested labs within last 30 days.      Urine culture  No results found for: LABURIN    Pathology:  Pathology results pending     Imaging:  I have personally reviewed the following films:    XR CHEST PORTABLE    Result Date: 10/8/2022  EXAMINATION: ONE XRAY VIEW OF THE CHEST 10/8/2022 12:50 pm COMPARISON: None. HISTORY: ORDERING SYSTEM PROVIDED HISTORY: shortness of breath TECHNOLOGIST PROVIDED HISTORY: Reason for exam:->shortness of breath Reason for exam:->hypoxia Reason for Exam: sob FINDINGS: The cardiac silhouette is normal.  There is a moderate-sized right pleural effusion with basilar atelectasis. There is mild left basilar atelectasis. No pneumothorax. Moderate-sized right basilar pleural effusion with atelectasis. Mild left basilar atelectasis. Scheduled Meds:   sodium chloride flush  5-40 mL IntraVENous 2 times per day    enoxaparin  40 mg SubCUTAneous Daily    acetaminophen  650 mg Oral Q6H    amLODIPine  10 mg Oral Daily    [Held by provider] apixaban  5 mg Oral BID    sodium chloride flush  10 mL IntraVENous 2 times per day    lactobacillus  1 capsule Oral BID WC    metoprolol succinate  25 mg Oral Daily    atorvastatin  10 mg Oral Daily    piperacillin-tazobactam  3,375 mg IntraVENous Q8H     Continuous Infusions:   sodium chloride 100 mL/hr at 10/09/22 1121    sodium chloride      sodium chloride       PRN Meds:.sodium chloride flush, sodium chloride, ondansetron **OR** ondansetron, morphine **OR** morphine, oxyCODONE **OR** oxyCODONE, sodium chloride flush, sodium chloride, acetaminophen **OR** acetaminophen, senna, ondansetron, morphine **OR** morphine      Assessment:  80 y.o. female admitted with   1. Acute cholecystitis    2. Transaminitis        OR Date 10/7/2022, laparoscopic cholecystectomy for acute cholecystitis  Procedure Date 10/6/2022, ERCP with stone removal for choledocholithiasis  Atrial fibrillation  CHF  Medical coagulopathy, on Eliquis--currently held  Acute kidney injury      Plan:  1.  Pain better controlled, no nausea/vomiting but continues to have belching, some flatus but no BM, abdomen soft but mildly distended on exam, vitals stable, LFT/bilirubin decreasing, leukocytosis decreasing, creatinine up to 1.9; continued supportive care  2. Increase to regular diet as tolerated; monitor bowel function  3. IV hydration and electrolyte correction per nephrology  4. Antibiotics  5. Activity as tolerated, ambulate TID, up to chair for meals--PT/OT evaluation and treatment  6. Pulmonary toilet, incentive spirometry--wean O2 as able  7. PRN analgesics and antiemetics--minimizing narcotics as tolerated, transition to PO, apply ICE  8. DVT prophylaxis with  Lovenox and SCD's; hold Eliquis  9. Management of medical comorbid etiologies per primary team and consulting services  10. Disposition: Anticipate will be ready for discharge from a surgical perspective in the next 24-48 hours    EDUCATION:  Educated patient on plan of care and disease process--all questions answered. Plans discussed with patient and nursing. Reviewed and discussed with Dr. Stephanie Jimenez. Signed:  TMI Bingham - CNP  10/9/2022 11:57 AM    Postop day #2 status post laparoscopic cholecystectomy  Doing much better today  Pain much improved  Abdomen appropriately tender  BUN/creatinine up to 46/1.9 today  Diet as tolerated  Continue IV fluid  Repeat labs in a.m.

## 2022-10-09 NOTE — CONSULTS
PULMONARY AND CRITICAL CARE MEDICINE CONSULTATION NOTE    CONSULTING PHYSICIAN:  SHLOMO Tello    ADMISSION DATE: 10/4/2022  ADMISSION DIAGNOSIS: Acute cholecystitis [K81.0]  Transaminitis [R74.01]    REASON FOR CONSULT:   Chief Complaint   Patient presents with    Abnormal CT     Abd pain and had a ct scan today showed gallbladder issues. Had a ekg, blood work, chest x ray       DATE OF CONSULT: 10/4/2022    HISTORY OF PRESENT ILLNESS: 80y.o. year old female with a past medical history significant for atrial fibrillation, heart failure with preserved ejection fraction, hypertension who presented to the hospital with abdominal pain radiating to her back. CT chest done showed signs suggestive of acute cholecystitis. Patient underwent laparoscopic cholecystectomy on 10/8/2022. Postop patient was seen to require 2 L/min of oxygen supplementation with chest imaging showing right-sided pleural effusion. Pulmonary has been consulted to assess the need for thoracentesis. At the time of my evaluation patient is sitting in bed in no apparent respiratory distress. Reports that her breathing is slowly improving. Currently on 2 L/min of oxygen supplementation saturating in mid 90s. Denies any cough, discolored expectoration. No chest pain. Does have surgical site pain. REVIEW OF SYSTEMS:     CONSTITUTIONAL SYMPTOMS: The patient denies fever, fatigue, night sweats, weight loss or weight gain. HEENT: No vision changes. No tinnitus, Denies sinus pain. No hoarseness, or dysphagia. NECK: Patient denies swelling in the neck. CARDIOVASCULAR: Denies chest pain, palpitation, syncope. RESPIRATORY: As per HPI. GASTROINTESTINAL: Denies nausea, abdominal pain or change in bowel function. GENITOURINARY: Denies obstructive symptoms. No history of incontinence. BREASTS: No masses or lumps in the breasts. SKIN: No rashes or itching. MUSCULOSKELETAL: Denies weakness or bone pain.    NEUROLOGICAL: No headaches or seizures. PSYCHIATRIC: Denies mood swings or depression. ENDOCRINE: Denies heat or cold intolerance or excessive thirst.  HEMATOLOGIC/LYMPHATIC: Denies easy bruising or lymph node swelling. ALLERGIC/IMMUNOLOGIC: No environmental allergies. PAST MEDICAL HISTORY:   Past Medical History:   Diagnosis Date    Atrial fibrillation ([facility] [address]     Diastolic CHF ([facility] [address]     Hypertension        PAST SURGICAL HISTORY:   Past Surgical History:   Procedure Laterality Date    ERCP N/A 10/6/2022    ERCP STONE REMOVAL performed by Barbara Chadwick MD at [facility]:   Social History     Tobacco Use    Smoking status: Never   Substance Use Topics    Alcohol use: Not Currently    Drug use: Never       FAMILY HISTORY: History reviewed. No pertinent family history. MEDICATIONS:     No current facility-administered medications on file prior to encounter.      Current Outpatient Medications on File Prior to Encounter   Medication Sig Dispense Refill    Apixaban (ELIQUIS PO) Take 5 mg by mouth in the morning and at bedtime      amLODIPine (NORVASC) 5 MG tablet Take 1 tablet by mouth daily      atorvastatin (LIPITOR) 10 MG tablet Take 1 tablet by mouth at bedtime      lisinopril (PRINIVIL;ZESTRIL) 20 MG tablet Take 1 tablet by mouth daily      metoprolol succinate (TOPROL XL) 25 MG extended release tablet Take 1 tablet by mouth daily          sodium chloride flush  5-40 mL IntraVENous 2 times per day    enoxaparin  40 mg SubCUTAneous Daily    acetaminophen  650 mg Oral Q6H    amLODIPine  10 mg Oral Daily    [Held by provider] apixaban  5 mg Oral BID    sodium chloride flush  10 mL IntraVENous 2 times per day    lactobacillus  1 capsule Oral BID WC    metoprolol succinate  25 mg Oral Daily    atorvastatin  10 mg Oral Daily    piperacillin-tazobactam  3,375 mg IntraVENous Q8H      sodium chloride 100 mL/hr at 10/09/22 1121    sodium chloride      sodium chloride       sodium chloride flush, sodium chloride, ondansetron **OR** ondansetron, morphine **OR** morphine, oxyCODONE **OR** oxyCODONE, sodium chloride flush, sodium chloride, acetaminophen **OR** acetaminophen, senna, ondansetron, morphine **OR** morphine      ALLERGIES:   Allergies as of 10/04/2022    (No Known Allergies)      OBJECTIVE:   height is 5' 7\" (1.702 m) and weight is 159 lb 2.8 oz (72.2 kg). Her oral temperature is 97.6 °F (36.4 °C). Her blood pressure is 101/59 (abnormal) and her pulse is 70. Her respiration is 16 and oxygen saturation is 93%. No intake/output data recorded. PHYSICAL EXAM:    CONSTITUTIONAL: She is a 80y.o.-year-old who appears her stated age. She is alert and oriented x 3 and in no acute distress. HEENT: PERRLA, EOMI. No scleral icterus. No thrush, atraumatic, normocephalic. NECK: Supple, without cervical or supraclavicular lymphadenopathy:  CARDIOVASCULAR: S1 S2 RRR. Without murmer. No JVD or hepatojugular reflux seen. RESPIRATORY & CHEST: Bibasilar decreased breath sounds heard. No wheezing heard. GASTROINTESTINAL & ABDOMEN: Soft, nontender, positive bowel sounds in all quadrants, non-distended, without hepatosplenomegaly. GENITOURINARY: No gross anatomical abnormalities seen. MUSCULOSKELETAL: No tenderness to palpation of the axial skeleton. There is no clubbing. No cyanosis. No edema of the lower extremities. SKIN OF BODY: No rash or jaundice. PSYCHIATRIC EVALUATION: Normal affect. Patient answers questions appropriately. HEMATOLOGIC/LYMPHATIC/ IMMUNOLOGIC: No palpable lymphadenopathy. NEUROLOGIC: Alert and oriented x 3. Groslly non-focal. Motor strength is 5+/5 in all muscle groups. The patient has a normal sensorium globally.       LABS:  Recent Labs     10/07/22  0552 10/08/22  0713 10/08/22  0714 10/09/22  0537   WBC 7.1  --  16.7* 12.6*   HGB 12.7  --  10.2* 8.2*   HCT 39.0  --  31.5* 25.3*     --  266 185   * 295*  --  204*   * 112*  --  68*    139  --  139   K 4.4 4. 6  --  4.2    108  --  109   CREATININE 0.6 1.0  --  1.9*   BUN 14 31*  --  46*   CO2 22 19*  --  19*       Recent Labs     10/07/22  0552 10/08/22  0713 10/09/22  0537   GLUCOSE 107* 180* 131*   CALCIUM 8.7 8.5 8.3    139 139   K 4.4 4.6 4.2   CO2 22 19* 19*    108 109   BUN 14 31* 46*   CREATININE 0.6 1.0 1.9*       No results for input(s): PHART, AVI3MKU, PO2ART, IAZ0ZBL, Z3FEUWAR, BEART, J3OFMYSJ in the last 72 hours. Lab Results   Component Value Date    INR 1.31 (H) 10/06/2022    INR 1.57 (H) 10/05/2022    INR 1.59 (H) 10/04/2022    PROTIME 16.2 (H) 10/06/2022    PROTIME 18.7 (H) 10/05/2022    PROTIME 18.9 (H) 10/04/2022     No results found for: AMYLASE   No results found for: LABA1C  No results found for: EAG  No results found for: TSH, J7RXPUF, K5YQEQZ, THYROIDAB, FT3, T4FREE  No results found for: CKTOTAL, CKMB, CKMBINDEX, TROPONINI   No results found for: CRP   No results found for: BNP   No results found for: DDIMER   No results found for: FERRITIN   Lab Results   Component Value Date    LACTA 1.1 10/05/2022           IMAGING:      Narrative   EXAMINATION:   ONE XRAY VIEW OF THE CHEST       10/8/2022 12:50 pm           FINDINGS:   The cardiac silhouette is normal.  There is a moderate-sized right pleural   effusion with basilar atelectasis. There is mild left basilar atelectasis. No pneumothorax. Impression   Moderate-sized right basilar pleural effusion with atelectasis. Mild left basilar atelectasis. IMPRESSION:     Acute cholecystitis s/p laparoscopic cholecystectomy  Postop mild hypoxia  Right-sided pleural effusion  Acute kidney injury  Paroxysmal atrial fibrillation    RECOMMENDATION:     Patient has presented to the hospital with abdominal pain and underwent laparoscopic cholecystectomy for acute cholecystitis yesterday.   Postop seen to have mild hypoxia with requirement of 2 L/min of oxygen supplementation  Chest x-ray showed right-sided pleural effusion. In order to assess the pleural space better, I performed a bedside thoracic ultrasound. A representative images as below:      Right pleural space. Liver and diaphragm are at 3 o'clock position. Lung edge is at 9 o'clock position. Intervening there is a very small collection of pleural fluid. I also assessed the left pleural space where normal pleural effusion was seen. At this time there is minimal pleural effusion on the right side. No thoracentesis indicated. It is quite possible that patient is having bibasilar atelectasis due to recent abdominal surgery. Encourage incentive spirometry. Out of bed into chair. Titrate oxygen to maintain SPO2 above 89%. Need for diuresis can be addressed by nephrology. I did discuss the case with Dr. Mariah Tomlinson. Thank you for your consultation. We will continue to follow the patient for one more day. Claire Gaitan MD  Pulmonary Critical Care and Sleep Medicine  10/4/2022, 1:57 PM    This note was completed using dragon medical speech recognition software. Grammatical errors, random word insertions, pronoun errors and incomplete sentences are occasional consequences of this technology due to software limitations. If there are questions or concerns about the content of this note of information contained within the body of this dictation they should be addressed with the provider for clarification.

## 2022-10-10 LAB
A/G RATIO: 1.1 (ref 1.1–2.2)
ALBUMIN SERPL-MCNC: 2.4 G/DL (ref 3.4–5)
ALP BLD-CCNC: 151 U/L (ref 40–129)
ALT SERPL-CCNC: 134 U/L (ref 10–40)
ANION GAP SERPL CALCULATED.3IONS-SCNC: 7 MMOL/L (ref 3–16)
AST SERPL-CCNC: 44 U/L (ref 15–37)
BACTERIA: ABNORMAL /HPF
BASOPHILS ABSOLUTE: 0 K/UL (ref 0–0.2)
BASOPHILS RELATIVE PERCENT: 0.2 %
BILIRUB SERPL-MCNC: 1 MG/DL (ref 0–1)
BILIRUBIN URINE: NEGATIVE
BLOOD, URINE: NEGATIVE
BUN BLDV-MCNC: 38 MG/DL (ref 7–20)
CALCIUM SERPL-MCNC: 7.9 MG/DL (ref 8.3–10.6)
CHLORIDE BLD-SCNC: 111 MMOL/L (ref 99–110)
CLARITY: ABNORMAL
CO2: 20 MMOL/L (ref 21–32)
COLOR: ABNORMAL
CREAT SERPL-MCNC: 1.2 MG/DL (ref 0.6–1.2)
CREATININE URINE: 144.8 MG/DL (ref 28–259)
EOSINOPHILS ABSOLUTE: 0.1 K/UL (ref 0–0.6)
EOSINOPHILS RELATIVE PERCENT: 1.3 %
EPITHELIAL CELLS, UA: 11 /HPF (ref 0–5)
GFR AFRICAN AMERICAN: 51
GFR NON-AFRICAN AMERICAN: 42
GLUCOSE BLD-MCNC: 88 MG/DL (ref 70–99)
GLUCOSE URINE: NEGATIVE MG/DL
HCT VFR BLD CALC: 21.9 % (ref 36–48)
HCT VFR BLD CALC: 23.3 % (ref 36–48)
HEMOGLOBIN: 7.1 G/DL (ref 12–16)
HEMOGLOBIN: 7.5 G/DL (ref 12–16)
HYALINE CASTS: 0 /LPF (ref 0–8)
KETONES, URINE: ABNORMAL MG/DL
LEUKOCYTE ESTERASE, URINE: NEGATIVE
LYMPHOCYTES ABSOLUTE: 1.2 K/UL (ref 1–5.1)
LYMPHOCYTES RELATIVE PERCENT: 13.3 %
MCH RBC QN AUTO: 28.6 PG (ref 26–34)
MCHC RBC AUTO-ENTMCNC: 32.7 G/DL (ref 31–36)
MCV RBC AUTO: 87.6 FL (ref 80–100)
MICROSCOPIC EXAMINATION: YES
MONOCYTES ABSOLUTE: 0.7 K/UL (ref 0–1.3)
MONOCYTES RELATIVE PERCENT: 8.2 %
NEUTROPHILS ABSOLUTE: 6.8 K/UL (ref 1.7–7.7)
NEUTROPHILS RELATIVE PERCENT: 77 %
NITRITE, URINE: NEGATIVE
PDW BLD-RTO: 15.1 % (ref 12.4–15.4)
PH UA: 5 (ref 5–8)
PLATELET # BLD: 170 K/UL (ref 135–450)
PMV BLD AUTO: 8.7 FL (ref 5–10.5)
POTASSIUM SERPL-SCNC: 3.9 MMOL/L (ref 3.5–5.1)
PROTEIN UA: ABNORMAL MG/DL
RBC # BLD: 2.5 M/UL (ref 4–5.2)
RBC UA: 1 /HPF (ref 0–4)
SODIUM BLD-SCNC: 138 MMOL/L (ref 136–145)
SODIUM URINE: 22 MMOL/L
SPECIFIC GRAVITY UA: 1.02 (ref 1–1.03)
TOTAL PROTEIN: 4.5 G/DL (ref 6.4–8.2)
URINE TYPE: ABNORMAL
UROBILINOGEN, URINE: 1 E.U./DL
WBC # BLD: 8.9 K/UL (ref 4–11)
WBC UA: 7 /HPF (ref 0–5)

## 2022-10-10 PROCEDURE — 1200000000 HC SEMI PRIVATE

## 2022-10-10 PROCEDURE — 99024 POSTOP FOLLOW-UP VISIT: CPT | Performed by: SURGERY

## 2022-10-10 PROCEDURE — APPSS15 APP SPLIT SHARED TIME 0-15 MINUTES: Performed by: NURSE PRACTITIONER

## 2022-10-10 PROCEDURE — 6370000000 HC RX 637 (ALT 250 FOR IP): Performed by: INTERNAL MEDICINE

## 2022-10-10 PROCEDURE — 97161 PT EVAL LOW COMPLEX 20 MIN: CPT

## 2022-10-10 PROCEDURE — 97530 THERAPEUTIC ACTIVITIES: CPT

## 2022-10-10 PROCEDURE — 94761 N-INVAS EAR/PLS OXIMETRY MLT: CPT

## 2022-10-10 PROCEDURE — 81001 URINALYSIS AUTO W/SCOPE: CPT

## 2022-10-10 PROCEDURE — 2580000003 HC RX 258: Performed by: SURGERY

## 2022-10-10 PROCEDURE — 82570 ASSAY OF URINE CREATININE: CPT

## 2022-10-10 PROCEDURE — 6370000000 HC RX 637 (ALT 250 FOR IP): Performed by: SURGERY

## 2022-10-10 PROCEDURE — APPNB30 APP NON BILLABLE TIME 0-30 MINS: Performed by: NURSE PRACTITIONER

## 2022-10-10 PROCEDURE — 94640 AIRWAY INHALATION TREATMENT: CPT

## 2022-10-10 PROCEDURE — 94669 MECHANICAL CHEST WALL OSCILL: CPT

## 2022-10-10 PROCEDURE — 85025 COMPLETE CBC W/AUTO DIFF WBC: CPT

## 2022-10-10 PROCEDURE — 85014 HEMATOCRIT: CPT

## 2022-10-10 PROCEDURE — 97116 GAIT TRAINING THERAPY: CPT

## 2022-10-10 PROCEDURE — 99233 SBSQ HOSP IP/OBS HIGH 50: CPT | Performed by: INTERNAL MEDICINE

## 2022-10-10 PROCEDURE — 84300 ASSAY OF URINE SODIUM: CPT

## 2022-10-10 PROCEDURE — 80053 COMPREHEN METABOLIC PANEL: CPT

## 2022-10-10 PROCEDURE — 85018 HEMOGLOBIN: CPT

## 2022-10-10 PROCEDURE — 2700000000 HC OXYGEN THERAPY PER DAY

## 2022-10-10 PROCEDURE — 6360000002 HC RX W HCPCS: Performed by: SURGERY

## 2022-10-10 PROCEDURE — 36415 COLL VENOUS BLD VENIPUNCTURE: CPT

## 2022-10-10 PROCEDURE — 2580000003 HC RX 258: Performed by: NURSE PRACTITIONER

## 2022-10-10 RX ORDER — IPRATROPIUM BROMIDE AND ALBUTEROL SULFATE 2.5; .5 MG/3ML; MG/3ML
1 SOLUTION RESPIRATORY (INHALATION)
Status: DISPENSED | OUTPATIENT
Start: 2022-10-10 | End: 2022-10-11

## 2022-10-10 RX ADMIN — IPRATROPIUM BROMIDE AND ALBUTEROL SULFATE 1 AMPULE: 2.5; .5 SOLUTION RESPIRATORY (INHALATION) at 20:07

## 2022-10-10 RX ADMIN — ACETAMINOPHEN 650 MG: 325 TABLET ORAL at 12:35

## 2022-10-10 RX ADMIN — SODIUM CHLORIDE: 9 INJECTION, SOLUTION INTRAVENOUS at 00:24

## 2022-10-10 RX ADMIN — METOPROLOL SUCCINATE 25 MG: 25 TABLET, EXTENDED RELEASE ORAL at 09:19

## 2022-10-10 RX ADMIN — PIPERACILLIN AND TAZOBACTAM 3375 MG: 3; .375 INJECTION, POWDER, FOR SOLUTION INTRAVENOUS at 09:21

## 2022-10-10 RX ADMIN — ENOXAPARIN SODIUM 40 MG: 100 INJECTION SUBCUTANEOUS at 09:19

## 2022-10-10 RX ADMIN — PIPERACILLIN AND TAZOBACTAM 3375 MG: 3; .375 INJECTION, POWDER, FOR SOLUTION INTRAVENOUS at 00:33

## 2022-10-10 RX ADMIN — ATORVASTATIN CALCIUM 10 MG: 10 TABLET, FILM COATED ORAL at 09:19

## 2022-10-10 RX ADMIN — PIPERACILLIN AND TAZOBACTAM 3375 MG: 3; .375 INJECTION, POWDER, FOR SOLUTION INTRAVENOUS at 18:29

## 2022-10-10 RX ADMIN — Medication 1 CAPSULE: at 18:21

## 2022-10-10 RX ADMIN — ACETAMINOPHEN 650 MG: 325 TABLET ORAL at 20:05

## 2022-10-10 RX ADMIN — Medication 1 CAPSULE: at 09:19

## 2022-10-10 RX ADMIN — AMLODIPINE BESYLATE 10 MG: 5 TABLET ORAL at 09:19

## 2022-10-10 ASSESSMENT — PAIN SCALES - GENERAL
PAINLEVEL_OUTOF10: 8
PAINLEVEL_OUTOF10: 8
PAINLEVEL_OUTOF10: 2
PAINLEVEL_OUTOF10: 0
PAINLEVEL_OUTOF10: 8

## 2022-10-10 ASSESSMENT — PAIN DESCRIPTION - DESCRIPTORS
DESCRIPTORS: SORE

## 2022-10-10 ASSESSMENT — PAIN DESCRIPTION - LOCATION
LOCATION: ABDOMEN

## 2022-10-10 ASSESSMENT — PAIN DESCRIPTION - PAIN TYPE: TYPE: SURGICAL PAIN

## 2022-10-10 NOTE — PLAN OF CARE
Problem: Pain  Goal: Verbalizes/displays adequate comfort level or baseline comfort level  Outcome: Progressing     Problem: Safety - Adult  Goal: Free from fall injury  10/9/2022 2258 by Roseline Yoon RN  Outcome: Progressing

## 2022-10-10 NOTE — PROGRESS NOTES
[facility]    10/10/2022 12:39 PM        Name: Mellisa Lu . Admitted: 10/4/2022  Primary Care Provider: No primary care provider on file.  (Tel: None)      Subjective:    Patient lying in bed is feeling fatigued denies any chest pain nausea vomiting still requiring some oxygen    Reviewed interval ancillary notes    Current Medications  sodium chloride flush 0.9 % injection 5-40 mL, 2 times per day  sodium chloride flush 0.9 % injection 5-40 mL, PRN  0.9 % sodium chloride infusion, PRN  enoxaparin (LOVENOX) injection 40 mg, Daily  ondansetron (ZOFRAN-ODT) disintegrating tablet 4 mg, Q8H PRN   Or  ondansetron (ZOFRAN) injection 4 mg, Q6H PRN  morphine (PF) injection 2 mg, Q2H PRN   Or  morphine injection 4 mg, Q2H PRN  oxyCODONE (ROXICODONE) immediate release tablet 5 mg, Q4H PRN   Or  oxyCODONE (ROXICODONE) immediate release tablet 10 mg, Q4H PRN  acetaminophen (TYLENOL) tablet 650 mg, Q6H  amLODIPine (NORVASC) tablet 10 mg, Daily  [Held by provider] apixaban (ELIQUIS) tablet 5 mg, BID  sodium chloride flush 0.9 % injection 10 mL, 2 times per day  sodium chloride flush 0.9 % injection 10 mL, PRN  0.9 % sodium chloride infusion, PRN  acetaminophen (TYLENOL) tablet 650 mg, Q6H PRN   Or  acetaminophen (TYLENOL) suppository 650 mg, Q6H PRN  senna (SENOKOT) tablet 8.6 mg, Daily PRN  ondansetron (ZOFRAN) injection 4 mg, Q6H PRN  lactobacillus (CULTURELLE) capsule 1 capsule, BID WC  morphine (PF) injection 2 mg, Q3H PRN   Or  morphine injection 4 mg, Q3H PRN  metoprolol succinate (TOPROL XL) extended release tablet 25 mg, Daily  atorvastatin (LIPITOR) tablet 10 mg, Daily  piperacillin-tazobactam (ZOSYN) 3,375 mg in dextrose 5 % 50 mL IVPB (mini-bag), Q8H        Objective:  BP (!) 113/49   Pulse 66   Temp 97.8 °F (36.6 °C)   Resp 16   Ht 5' 7\" (1.702 m)   Wt 164 lb 0.4 oz (74.4 kg)   LMP 09/01/1962 (Within Years)   SpO2 93%   BMI 25.69 kg/m²     Intake/Output Summary (Last 24 hours) at 10/10/2022 1239  Last data filed at 10/10/2022 0537  Gross per 24 hour   Intake 2034.52 ml   Output 475 ml   Net 1559.52 ml      Wt Readings from Last 3 Encounters:   10/10/22 164 lb 0.4 oz (74.4 kg)       General appearance:  Appears comfortable. AAOx3  HEENT: atraumatic, Pupils equal, muscous membranes moist, no masses appreciated  Cardiovascular: Regular rate and rhythm no murmurs appreciated  Respiratory: CTAB no wheezing  Gastrointestinal: Abdomen soft, non-tender, BS+  EXT: no edema  Neurology: no gross focal deficts  Psychiatry: Appropriate affect. Not agitated  Skin: Warm, dry, no rashes appreciated    Labs and Tests:  CBC:   Recent Labs     10/08/22  0714 10/09/22  0537 10/10/22  0635   WBC 16.7* 12.6* 8.9   HGB 10.2* 8.2* 7.1*    185 170     BMP:    Recent Labs     10/08/22  0713 10/09/22  0537 10/10/22  0635    139 138   K 4.6 4.2 3.9    109 111*   CO2 19* 19* 20*   BUN 31* 46* 38*   CREATININE 1.0 1.9* 1.2   GLUCOSE 180* 131* 88     Hepatic:   Recent Labs     10/08/22  0713 10/09/22  0537 10/10/22  0635   * 68* 44*   * 204* 134*   BILITOT 1.5* 1.2* 1.0   ALKPHOS 241* 166* 151*     XR CHEST PORTABLE   Final Result   Moderate-sized right basilar pleural effusion with atelectasis. Mild left basilar atelectasis. FL ERCP BILIARY AND PANCREATIC S&I   Final Result   ERCP images as above. .  Please refer to the procedure report for further   details. MRI ABDOMEN W WO CONTRAST MRCP   Final Result   Findings suggestive of acute cholecystitis. No suspicious gallbladder mass   seen as suggested on prior ultrasound. Multiple gallstones are seen. There is a small filling defect seen in the distal common duct, either small   amount of sludge or tiny stone. CT ABDOMEN PELVIS W IV CONTRAST Additional Contrast? None   Final Result   Cholelithiasis.   Gallbladder wall thickening, pericholecystic fat stranding   and trace fluid suggest acute cholecystitis. Mild to moderate common bile duct dilatation extending to the ampulla. Choledocholithiasis must be considered. Follow-up MRCP would be helpful in   further evaluation. Sigmoid colon diverticulosis. No evidence of diverticulitis. 2.5 cm left ovarian cyst.  Follow-up recommendation as below. RECOMMENDATIONS:   Managing Incidental Adnexal Cystic Mass by CT or MR      Late postmenopausal      < or equal to 3 cm: no follow up      >3 cm: US promptly      Reference:      Tony persaud. Managing Incidental Findings on Abdominal CT: White Paper of   the ACR Incidental Findings Committee. J Am Lo Radiol 2010;7:754-773         US GALLBLADDER RUQ   Final Result   1. Cholelithiasis is identified, with minimal pericholecystic fluid. There   is also a focal area of masslike thickening and increased echogenicity as   well as posterior q.6 shadowing in a non dependent location involving the   fundus of the gallbladder. A gallbladder mass cannot be excluded. Recommend   further evaluation with dedicated CT imaging of the abdomen and pelvis with   contrast.   2. Dilation of the common bile duct. This can be assessed on CT imaging as   well. 3. There are cysts associated with the right kidney, as well as questionable   hydronephrosis. This can be assessed on CT imaging. FLUORO FOR SURGICAL PROCEDURES    (Results Pending)       Recent imaging reviewed    Problem List  Principal Problem:    Acute cholecystitis  Resolved Problems:    * No resolved hospital problems.  *       Assessment & Plan:   Acute cholecystitis   S/p cholecystectomy  - surgery on board    Hypoxia:   Out of bed to chair stop fluids IC    JOHN: improved    PAF: home meds restart eliquis once ok with surgery    Chronic diastolic chf    Htn: montior    Anemia of postoperative blood loss  Repeat labs now      Favian Haider MD   10/10/2022 12:39 PM

## 2022-10-10 NOTE — PROGRESS NOTES
PULMONARY AND CRITICAL CARE MEDICINE PROGRESS NOTE    Subjective: Patient lying in bed in no apparent respiratory distress. Reports of generalized fatigue and tiredness. Still requiring 2 L minute of oxygen supplementation. Denies any cough or discolored expectoration. No chest pain or chest tightness    REVIEW OF SYSTEMS:     8 point review of systems negative except that mentioned HPI. PAST MEDICAL HISTORY:   Past Medical History:   Diagnosis Date    Atrial fibrillation ([facility] [address]     Diastolic CHF ([facility] [address]     Hypertension        PAST SURGICAL HISTORY:   Past Surgical History:   Procedure Laterality Date    CHOLECYSTECTOMY, LAPAROSCOPIC N/A 10/7/2022    LAPAROSCOPIC CHOLECYSTECTOMY WITH INTRAOPERATIVE CHOLANGIOGRAM performed by Ceferino Byrd MD at [address] [place] [address]    ERCP N/A 10/6/2022    ERCP STONE REMOVAL performed by Frederick Phillips MD at [facility]:   Social History     Tobacco Use    Smoking status: Never   Substance Use Topics    Alcohol use: Not Currently    Drug use: Never       FAMILY HISTORY: History reviewed. No pertinent family history. MEDICATIONS:     No current facility-administered medications on file prior to encounter.      Current Outpatient Medications on File Prior to Encounter   Medication Sig Dispense Refill    Apixaban (ELIQUIS PO) Take 5 mg by mouth in the morning and at bedtime      amLODIPine (NORVASC) 5 MG tablet Take 1 tablet by mouth daily      atorvastatin (LIPITOR) 10 MG tablet Take 1 tablet by mouth at bedtime      lisinopril (PRINIVIL;ZESTRIL) 20 MG tablet Take 1 tablet by mouth daily      metoprolol succinate (TOPROL XL) 25 MG extended release tablet Take 1 tablet by mouth daily          sodium chloride flush  5-40 mL IntraVENous 2 times per day    enoxaparin  40 mg SubCUTAneous Daily    acetaminophen  650 mg Oral Q6H    amLODIPine  10 mg Oral Daily    [Held by provider] apixaban  5 mg Oral BID    sodium chloride flush  10 mL IntraVENous 2 times per day    lactobacillus  1 capsule Oral BID     metoprolol succinate  25 mg Oral Daily    atorvastatin  10 mg Oral Daily    piperacillin-tazobactam  3,375 mg IntraVENous Q8H      sodium chloride      sodium chloride       sodium chloride flush, sodium chloride, ondansetron **OR** ondansetron, morphine **OR** morphine, oxyCODONE **OR** oxyCODONE, sodium chloride flush, sodium chloride, acetaminophen **OR** acetaminophen, senna, ondansetron, morphine **OR** morphine      ALLERGIES:   Allergies as of 10/04/2022    (No Known Allergies)      OBJECTIVE:   height is 5' 7\" (1.702 m) and weight is 164 lb 0.4 oz (74.4 kg). Her oral temperature is 97.8 °F (36.6 °C). Her blood pressure is 113/49 (abnormal) and her pulse is 66. Her respiration is 16 and oxygen saturation is 93%. No intake/output data recorded. PHYSICAL EXAM:    CONSTITUTIONAL: She is a 80y.o.-year-old who appears her stated age. She is alert and oriented x 3 and in no acute distress. HEENT: PERRLA, EOMI. No scleral icterus. No thrush, atraumatic, normocephalic. NECK: Supple, without cervical or supraclavicular lymphadenopathy:  CARDIOVASCULAR: S1 S2 RRR. Without murmer. No JVD or hepatojugular reflux seen. RESPIRATORY & CHEST: Bibasilar decreased breath sounds heard. No wheezing heard. GASTROINTESTINAL & ABDOMEN: Soft, nontender, positive bowel sounds in all quadrants, non-distended, without hepatosplenomegaly. GENITOURINARY: No gross anatomical abnormalities seen. MUSCULOSKELETAL: No tenderness to palpation of the axial skeleton. There is no clubbing. No cyanosis. No edema of the lower extremities. SKIN OF BODY: No rash or jaundice. PSYCHIATRIC EVALUATION: Normal affect. Patient answers questions appropriately. HEMATOLOGIC/LYMPHATIC/ IMMUNOLOGIC: No palpable lymphadenopathy. NEUROLOGIC: Alert and oriented x 3. Groslly non-focal. Motor strength is 5+/5 in all muscle groups. The patient has a normal sensorium globally.       LABS:  Recent Labs     10/08/22  0713 10/08/22  0714 10/08/22  0714 10/09/22  0537 10/10/22  0635 10/10/22  1304   WBC  --  16.7*  --  12.6* 8.9  --    HGB  --  10.2*   < > 8.2* 7.1* 7.5*   HCT  --  31.5*   < > 25.3* 21.9* 23.3*   PLT  --  266  --  185 170  --    *  --   --  204* 134*  --    *  --   --  68* 44*  --      --   --  139 138  --    K 4.6  --   --  4.2 3.9  --      --   --  109 111*  --    CREATININE 1.0  --   --  1.9* 1.2  --    BUN 31*  --   --  46* 38*  --    CO2 19*  --   --  19* 20*  --     < > = values in this interval not displayed. Recent Labs     10/08/22  0713 10/09/22  0537 10/10/22  0635   GLUCOSE 180* 131* 88   CALCIUM 8.5 8.3 7.9*    139 138   K 4.6 4.2 3.9   CO2 19* 19* 20*    109 111*   BUN 31* 46* 38*   CREATININE 1.0 1.9* 1.2       No results for input(s): PHART, SPY4OXO, PO2ART, FKC5BJC, S8PHTQLZ, BEART, M0RVVOXV in the last 72 hours. Lab Results   Component Value Date    INR 1.31 (H) 10/06/2022    INR 1.57 (H) 10/05/2022    INR 1.59 (H) 10/04/2022    PROTIME 16.2 (H) 10/06/2022    PROTIME 18.7 (H) 10/05/2022    PROTIME 18.9 (H) 10/04/2022     No results found for: AMYLASE   No results found for: LABA1C  No results found for: EAG  No results found for: TSH, J1EZRYD, O0HHKIY, THYROIDAB, FT3, T4FREE  No results found for: CKTOTAL, CKMB, CKMBINDEX, TROPONINI   No results found for: CRP   No results found for: BNP   No results found for: DDIMER   No results found for: FERRITIN   Lab Results   Component Value Date    LACTA 1.1 10/05/2022           IMAGING:      Narrative   EXAMINATION:   ONE XRAY VIEW OF THE CHEST       10/8/2022 12:50 pm           FINDINGS:   The cardiac silhouette is normal.  There is a moderate-sized right pleural   effusion with basilar atelectasis. There is mild left basilar atelectasis. No pneumothorax. Impression   Moderate-sized right basilar pleural effusion with atelectasis. Mild left basilar atelectasis. IMPRESSION:     Acute cholecystitis s/p laparoscopic cholecystectomy  Postop mild hypoxia  Right-sided pleural effusion  Acute kidney injury  Paroxysmal atrial fibrillation    RECOMMENDATION:     Patient has presented to the hospital with abdominal pain and underwent laparoscopic cholecystectomy for acute cholecystitis 2 days ago. Postop seen to have mild hypoxia with requirement of 2 L/min of oxygen supplementation  Chest x-ray showed right-sided pleural effusion. I performed a bedside ultrasound yesterday which did not show any significant pleural effusion. No indications for thoracentesis. Most likely having postop atelectasis. Continues with incentive spirometry. I will add DuoNeb therapy and MetaNeb therapy for intermittent positive pressure breathing for 24 hours. Out of bed into chair. Titrate oxygen to maintain SPO2 above 89%. Need for diuresis can be addressed by nephrology. Nothing further to add from pulmonary standpoint. We will sign off. Emilio Ulrich MD  Pulmonary Critical Care and Sleep Medicine  10/4/2022, 1:21 PM    This note was completed using dragon medical speech recognition software. Grammatical errors, random word insertions, pronoun errors and incomplete sentences are occasional consequences of this technology due to software limitations. If there are questions or concerns about the content of this note of information contained within the body of this dictation they should be addressed with the provider for clarification.

## 2022-10-10 NOTE — PROGRESS NOTES
[facility]        Progress Note    Patient Name: Mellisa Lu  MRN: [id number]  YOB: 1931  Date of Evaluation: 10/10/2022    Postoperative Day #3    Subjective:  No acute events overnight  Pain controlled  No nausea or vomiting, continues to report belching, poor appetite  Passing some flatus, no stool  Resting in bed at this time; reports feeling generalized weakness/fatigue      Vital Signs:  Patient Vitals for the past 24 hrs:   BP Temp Temp src Pulse Resp SpO2 Weight   10/10/22 1230 (!) 113/49 97.8 °F (36.6 °C) Oral 66 16 93 % --   10/10/22 1223 -- -- -- -- -- 93 % --   10/10/22 0919 -- -- -- 78 -- -- --   10/10/22 0900 (!) 147/63 97.7 °F (36.5 °C) Oral 51 16 90 % --   10/10/22 0458 (!) 115/58 97.6 °F (36.4 °C) Oral 70 15 96 % --   10/10/22 0324 -- -- -- 57 -- -- --   10/10/22 0034 -- -- -- -- -- (!) 83 % --   10/10/22 0004 (!) 127/59 97.6 °F (36.4 °C) Oral 73 16 93 % 164 lb 0.4 oz (74.4 kg)   10/09/22 2346 -- -- -- 66 -- -- --   10/09/22 1935 (!) 123/53 97.8 °F (36.6 °C) Oral 65 14 94 % --   10/09/22 1922 -- -- -- 68 -- -- --   10/09/22 1829 117/64 97.5 °F (36.4 °C) Oral 67 16 94 % --   10/09/22 1400 -- -- -- 68 -- -- --        TEMPERATURE HISTORY 24H: Temp (24hrs), Av.7 °F (36.5 °C), Min:97.5 °F (36.4 °C), Max:97.8 °F (36.6 °C)    BLOOD PRESSURE HISTORY: Systolic (57OQS), SWQ:544 , Min:101 , SRS:475    Diastolic (10MSH), YJM:89, Min:49, Max:64      Intake/Output:  I/O last 3 completed shifts: In: 3789.5 [I.V.:3631.5; IV Piggyback:158]  Out: 675 [Urine:675]  No intake/output data recorded.   Drain/tube Output:       Physical Exam:  General: awake, alert, oriented to person, place, time  Lungs: unlabored respirations  Abdomen: soft, mildly distended, incisional tenderness only, bowel sounds present  Skin/Wound: healing well, no drainage, no erythema, ecchymosis noted, well approximated    Labs:  CBC:    Recent Labs     10/08/22  0714 10/09/22  0537 10/10/22  0635 10/10/22  1304   WBC 16.7* 12.6* 8.9  --    HGB 10.2* 8.2* 7.1* 7.5*   HCT 31.5* 25.3* 21.9* 23.3*    185 170  --        BMP:    Recent Labs     10/08/22  0713 10/09/22  0537 10/10/22  0635    139 138   K 4.6 4.2 3.9    109 111*   CO2 19* 19* 20*   BUN 31* 46* 38*   CREATININE 1.0 1.9* 1.2   GLUCOSE 180* 131* 88       Hepatic:    Recent Labs     10/08/22  0713 10/09/22  0537 10/10/22  0635   * 68* 44*   * 204* 134*   BILITOT 1.5* 1.2* 1.0   ALKPHOS 241* 166* 151*       Amylase:  No results found for: AMYLASE  Lipase:    Lab Results   Component Value Date/Time    LIPASE 25.0 10/07/2022 05:52 AM    LIPASE 51.0 10/05/2022 06:07 AM    LIPASE 38.0 10/04/2022 08:09 PM        Mag:    Lab Results   Component Value Date/Time    MG 1.80 10/05/2022 06:07 AM     Phos:     Lab Results   Component Value Date/Time    PHOS 2.9 10/05/2022 06:07 AM      Coags:   Lab Results   Component Value Date/Time    PROTIME 16.2 10/06/2022 04:48 AM    INR 1.31 10/06/2022 04:48 AM    APTT 34.8 10/05/2022 06:07 AM       Cultures:  Anaerobic culture  No results found for: LABANAE  Fungus stain  No results found for requested labs within last 30 days. Gram stain  No results found for requested labs within last 30 days. Organism  No results found for: [facility]  Surgical culture  No results found for: CXSURG  Blood culture 1  No results found for requested labs within last 30 days. Blood culture 2  No results found for requested labs within last 30 days. Fecal occult  No results found for requested labs within last 30 days. GI bacterial pathogens by PCR  No results found for requested labs within last 30 days. C. difficile  No results found for requested labs within last 30 days. Urine culture  No results found for: LABURIN    Pathology:  Pathology results pending     Imaging:  I have personally reviewed the following films:    No results found.     Scheduled Meds: ipratropium-albuterol  1 ampule Inhalation Q4H WA    sodium chloride flush  5-40 mL IntraVENous 2 times per day    enoxaparin  40 mg SubCUTAneous Daily    acetaminophen  650 mg Oral Q6H    amLODIPine  10 mg Oral Daily    [Held by provider] apixaban  5 mg Oral BID    sodium chloride flush  10 mL IntraVENous 2 times per day    lactobacillus  1 capsule Oral BID WC    metoprolol succinate  25 mg Oral Daily    atorvastatin  10 mg Oral Daily    piperacillin-tazobactam  3,375 mg IntraVENous Q8H     Continuous Infusions:   sodium chloride      sodium chloride       PRN Meds:.sodium chloride flush, sodium chloride, ondansetron **OR** ondansetron, morphine **OR** morphine, oxyCODONE **OR** oxyCODONE, sodium chloride flush, sodium chloride, acetaminophen **OR** acetaminophen, senna, ondansetron, morphine **OR** morphine      Assessment:  80 y.o. female admitted with   1. Acute cholecystitis    2. Transaminitis        OR Date 10/7/2022, laparoscopic cholecystectomy for acute cholecystitis  Procedure Date 10/6/2022, ERCP with stone removal for choledocholithiasis  Atrial fibrillation  CHF  Medical coagulopathy, on Eliquis--currently held  Acute kidney injury      Plan:  1. Pain controlled, no nausea/vomiting but continues to have belching and appetite is poor, some flatus but no BM, abdomen soft but mildly distended on exam--stable, vitals stable, LFT/bilirubin decreasing, leukocytosis resolved, creatinine decreasing, hemoglobin decreased but stable; continued supportive care  2. Regular diet with supplements as tolerated; monitor bowel function  3. IV hydration and electrolyte correction per nephrology  4. Antibiotics  5. Activity as tolerated, ambulate TID, up to chair for meals--PT/OT evaluation and treatment  6. Pulmonary toilet, incentive spirometry--wean O2 as able  7. PRN analgesics and antiemetics--minimizing narcotics as tolerated, transition to PO, apply ICE  8. DVT prophylaxis with  Lovenox and SCD's; hold Eliquis  9. Management of medical comorbid etiologies per primary team and consulting services  10. Disposition: Anticipate will be ready for discharge from a surgical perspective in the next 24-48 hours; may need SNF at discharge--social work consulted    EDUCATION:  Educated patient on plan of care and disease process--all questions answered. Plans discussed with patient and nursing. Reviewed and discussed with Dr. Arielle Man. Signed:  Magno Merrittdwayne, APRN - CNP  10/10/2022 1:48 PM    I have personally performed a face to face diagnostic evaluation on this patient. I have interviewed and examined the patient and I agree with the assessment above. Time was spent reviewing patient chart (including but not limited to notes, labs, imaging and other testing), interviewing and counseling patient and present family members, performing physical exam, documentation of my findings and subsequent follow up of ordered medication and testing, placing referrals and communication with patient care providers, coordinating future care as well as documentation in the EHR. This encompassed more than 50% of the total encounter time. In summary, my findings and plan are the following:   Ms. Boby Bond is a 80 y.o. female who presents with   OR Date 10/7/2022, laparoscopic cholecystectomy for acute cholecystitis  Procedure Date 10/6/2022, ERCP with stone removal for choledocholithiasis  Atrial fibrillation  CHF  Medical coagulopathy, on Eliquis--currently held  Acute kidney injury    Plan:  1. Tolerating diet but with poor appetite, encourage PO intake as nutrition critical for wound healing, trying protein supplementation  2. Increase activity, pulmonary toilet, incentive spirometry, wean oxygen  3. Antibiotics  4. IVF, electrolytes per nephrology  5. Anemia, trend hemoglobin, transfusion trigger of 7, hold PO anticoagulation  6. LFT's reassuring, trend  7. Pain control, transition to PO and minimize narcotics  8.  Defer management of remainder of medical comorbidities to primary and consulting teams  9. Discharge planning, may need SNF at discharge    [address]  Marty Beard MD, FACS  10/10/2022  4:51 PM

## 2022-10-10 NOTE — PROGRESS NOTES
[facility]   Nephrology progress Note  [phone]   [facility]           Reason for Consult:  JOHN , date of admission 10/4/2022  The patient is a 80 y.o. female with significant past medical history of hypertension, diastolic CHF, atrial fibrillation, gallstones removed by ERCP on 10/6/2022 followed by laparoscopic cholecystectomy on 10/7/2022,      She has no history of kidney disease kidney stone disease. Her serum creatinine on 10/8/2022 was 1 with a bun of 31 today the serum creatinine was elevated at 1.9 with a bun of 46 bicarb is 19. She is on antibiotic Zosyn, she is not on a ACE inhibitor/ARB or NSAID  Per records and nurse she has not had NSAIDs recently. Prior to admission she is appropriately on ACE inhibitor lisinopril for her blood pressure  There is been no hypotension  She has been on IV fluids,  oral intake has been poor  She did have a CT scan with IV contrast on 10/5/2022, the renal function decline started between 10/7 and 10/8/2022    Interval  History:    10/10/22: Discussed her care with her daughter. Both the patient and the daughter says she has never had congestive heart failure. She had hypertension for many years and this year was the first time she developed atrial fibrillation  Has no dyspnea no cough no short chest pain        PHYSICAL EXAM:    Vitals:    BP (!) 147/63   Pulse 78   Temp 97.7 °F (36.5 °C)   Resp 15   Ht 5' 7\" (1.702 m)   Wt 164 lb 0.4 oz (74.4 kg)   LMP 09/01/1962 (Within Years)   SpO2 90%   BMI 25.69 kg/m²   I/O last 3 completed shifts: In: 3789.5 [I.V.:3631.5; IV Piggyback:158]  Out: 675 [Urine:675]  No intake/output data recorded. Physical Exam:  Gen:  alert, oriented x 3, frail   PERRL , EOM +  Pallor +, No icterus  JVP not raised   CV: RRR no murmur or rub .   Lungs:B/ L air entry, Normal breath sounds , no dullness to percussion either bases  Abd: soft, bowel sounds + , No organomegaly , post op ( laproscopic )  Ext: No edema, no cyanosis  Skin: Warm.   No rash  Neuro: no focal defiict      DATA:    CBC with Differential:    Lab Results   Component Value Date/Time    WBC 8.9 10/10/2022 06:35 AM    RBC 2.50 10/10/2022 06:35 AM    HGB 7.1 10/10/2022 06:35 AM    HCT 21.9 10/10/2022 06:35 AM     10/10/2022 06:35 AM    MCV 87.6 10/10/2022 06:35 AM    MCH 28.6 10/10/2022 06:35 AM    MCHC 32.7 10/10/2022 06:35 AM    RDW 15.1 10/10/2022 06:35 AM    LYMPHOPCT 13.3 10/10/2022 06:35 AM    MONOPCT 8.2 10/10/2022 06:35 AM    BASOPCT 0.2 10/10/2022 06:35 AM    MONOSABS 0.7 10/10/2022 06:35 AM    LYMPHSABS 1.2 10/10/2022 06:35 AM    EOSABS 0.1 10/10/2022 06:35 AM    BASOSABS 0.0 10/10/2022 06:35 AM     CMP:    Lab Results   Component Value Date/Time     10/10/2022 06:35 AM    K 3.9 10/10/2022 06:35 AM     10/10/2022 06:35 AM    CO2 20 10/10/2022 06:35 AM    BUN 38 10/10/2022 06:35 AM    CREATININE 1.2 10/10/2022 06:35 AM    GFRAA 51 10/10/2022 06:35 AM    AGRATIO 1.1 10/10/2022 06:35 AM    LABGLOM 42 10/10/2022 06:35 AM    GLUCOSE 88 10/10/2022 06:35 AM    PROT 4.5 10/10/2022 06:35 AM    LABALBU 2.4 10/10/2022 06:35 AM    CALCIUM 7.9 10/10/2022 06:35 AM    BILITOT 1.0 10/10/2022 06:35 AM    ALKPHOS 151 10/10/2022 06:35 AM    AST 44 10/10/2022 06:35 AM     10/10/2022 06:35 AM     Magnesium:    Lab Results   Component Value Date/Time    MG 1.80 10/05/2022 06:07 AM     Phosphorus:    Lab Results   Component Value Date/Time    PHOS 2.9 10/05/2022 06:07 AM     Uric Acid:  No results found for: LABURIC, URICACID  Last 3 Troponin:  No results found for: TROPONINI  U/A:    Lab Results   Component Value Date/Time    COLORU DARK YELLOW 10/10/2022 12:05 AM    PROTEINU TRACE 10/10/2022 12:05 AM    PHUR 5.0 10/10/2022 12:05 AM    WBCUA 7 10/10/2022 12:05 AM    RBCUA 1 10/10/2022 12:05 AM    BACTERIA None Seen 10/10/2022 12:05 AM    CLARITYU CLOUDY 10/10/2022 12:05 AM    SPECGRAV 1.025 10/10/2022 12:05 AM LEUKOCYTESUR Negative 10/10/2022 12:05 AM    UROBILINOGEN 1.0 10/10/2022 12:05 AM    BILIRUBINUR Negative 10/10/2022 12:05 AM    BLOODU Negative 10/10/2022 12:05 AM    GLUCOSEU Negative 10/10/2022 12:05 AM           IMPRESSION/RECOMMENDATIONS:      JOHN : The reason for this is unclear at this time. Contrast nephropathy( CT scan 10/5/22)  is a possibility; as her serum creatinine baseline is 0.6, it was 1 on 10 8 and is 1.9 today. Urine analysis Trace  proteinuria , no hematuria   fractional excretion of sodium< 1  Cr lower at 1.2   Monitor renal function      Thank you for allowing me to participate in the care of this patient. I will continue to follow along. Please call with questions.     Gaurav Mccauley MD, MD  10/10/2022  [facility]

## 2022-10-10 NOTE — PROGRESS NOTES
[facility]   Phone: [phone]    Physical Therapy    [x] Initial Evaluation            [] Daily Treatment Note         [] Discharge Summary      Patient: Reagan Collins   : 3/16/1931   MRN: [id number]   Date of Service:  10/10/2022  Admitting Diagnosis: Acute cholecystitis  Current Admission Summary: Reagan Collins is a 80 y.o. female who presents to the emergency department today for evaluation for back pain, and abdominal pain. Patient states that she has been experiencing intermittent pain across her back, as well as occasionally to her upper abdomen, and she states that this is actually been ongoing since . The patient states that the pain seems to wax and wane on its own. Patient states that she went to her primary care physician's office today, she did have lab work, EKG performed. She did have a CT of her chest obtained as there was concern for possible chest or back etiology, which did show some edema around the gallbladder, and the patient was sent to the ED for further care and evaluation. Patient is complaining of pain throughout her upper abdomen and into her back, she is rating her pain as an 8/10. The patient states that her pain is sharp. She states that her pain is \"just on the inside\". As she does not feel that it is reproducible to her back. Patient has noted some occasional nausea and decreased appetite but is not any vomiting. No diarrhea. She has no chest pain. She is no shortness of breath. She has no fever or chills. She has no cough or congestion. She has no urinary symptoms. She has a history of atrial fibrillation and is anticoagulated on Eliquis. Patient denies any past surgical history to her abdomen. She denies any fall or injury, no other complaints. Past Medical History:  has a past medical history of Atrial fibrillation ([address] Diastolic CHF ([address] and Hypertension.   Past Surgical History:  has a past surgical history that includes ERCP (N/A, 10/6/2022) and Cholecystectomy, laparoscopic (N/A, 10/7/2022). Discharge Recommendations: Jenniffer Peterson scored a 17/24 on the AM-PAC short mobility form. Current research shows that an AM-PAC score of 18 or greater is typically associated with a discharge to the patient's home setting. Based on the patient's AM-PAC score and their current functional mobility deficits, it is recommended that the patient have 2-3 sessions per week of Physical Therapy at d/c to increase the patient's independence. At this time, this patient demonstrates the endurance and safety to discharge home with (home services) and a follow up treatment frequency of 2-3x/wk. Please see assessment section for further patient specific details. If patient discharges prior to next session this note will serve as a discharge summary. Please see below for the latest assessment towards goals. HOME HEALTH CARE: LEVEL 3 SAFETY     - Initial home health evaluation to occur within 24-48 hours, in patient home   - Therapy evaluations in home within 24-48 hours of discharge; including DME and home safety   - Frontload therapy 5 days, then 3x a week   - Therapy to evaluate if patient has [address] needs for personal care   -  evaluation within 24-48 hours, includes evaluation of resources and insurance to determine [place], [place], LTC, and Medicaid options     Anticipate continued improvement with continued mobility while in acute setting. Pt voices a desire to D/C to [facility] for short period of time following acute stay for post-op care. Pt reports some family support in area but typically is alone and independent at home.     DME Required For Discharge: DME to be determined pending patient progress, rolling walker  Precautions/Restrictions: high fall risk  Weight Bearing Restrictions: no restrictions  [] Right Upper Extremity  [] Left Upper Extremity [] Right Lower Extremity  [] Left Lower Extremity Required Braces/Orthotics: no braces required   [] Right  [] Left  Positional Restrictions:no positional restrictions    Pre-Admission Information   Lives With: alone    Type of Home: house  Home Layout: one level, laundry in basement, able to live on main level  Home Access:  2 step to enter with handrail. Handrails are located on L side. Bathroom Layout: tub/shower unit, walk in shower (wall-in shower is in basement. Pt typically uses walk-in shower)  Bathroom Equipment: shower chair, hand held shower head  Toilet Height: standard height  Home Equipment: standard walker, single point cane, reacher, sock aide  Transfer Assistance: Independent without use of device  Ambulation Assistance:Independent without use of device, modified independent with use of SPC  (Pt reports use of cane more in last 2-3 weeks, but states she has mostly \"been carrying it around\")  ADL Assistance: independent with all ADL's  IADL Assistance: independent with homemaking tasks, requires assistance with cleaning(Pt has house-cleaning services)  Active :        [x] Yes  [] No  Hand Dominance: [] Left  [x] Right  Current Employment: retired. Occupation:   Hobbies: Watch TV, call friends on phone  Recent Falls: Pt denies recent falls    Examination   Vision:   Vision Gross Assessment: Impaired and Vision Corrective Device: wears glasses for reading  Hearing:   WFL  Posture:   Fair  Sensation:   reports numbness and tingling in (B) LE   ROM:  (B) LE AROM WFL  Strength:   (B) LE strength grossly WFL  Decision Making: low complexity  Clinical Presentation: stable      Subjective  General: Pt supine in bed upon arrival. Agreeable to PT eval, but reports apprehension regarding pain with mobility.   Pain: 0/10 (pt reports no pain at rest, and having had a Tylenol ~2 hours prior to PT arrival)  Pain Interventions: not applicable and pain medication in place prior to arrival       Functional Mobility  Bed Mobility  Supine to Sit: contact guard assistance  Rolling Right: minimal assistance  Scooting: contact guard assistance  Comments:  Transfers  Sit to stand transfer: contact guard assistance  Stand to sit transfer: contact guard assistance  Stand step transfer: contact guard assistance  Toilet transfer: contact guard assistance  Comments: cues for hand placement  Ambulation  Surface:level surface  Assistive Device: rolling walker  Assistance: contact guard assistance  Distance: ~10 ft to/from bathroom  Gait Mechanics: Pt ambulates with mild forward flexion, decreased step length, slow palmira, slightly widened SACHIN, no LOB. Comments:    Stair Mobility  Stair mobility not completed on this date. Comments:  Wheelchair Mobility:  No w/c mobility completed on this date. Comments:  Balance  Static Sitting Balance: fair (+): maintains balance at SBA/supervision without use of UE support  Dynamic Sitting Balance: fair (+): maintains balance at SBA/supervision without use of UE support  Static Standing Balance: fair (-): maintains balance at CGA with use of UE support  Dynamic Standing Balance: fair (-): maintains balance at CGA with use of UE support  Comments:    Other Therapeutic Interventions    Functional Outcomes  AM-PAC Inpatient Mobility Raw Score : 17              Cognition  WFL  Orientation:    alert and oriented x 4  Command Following:   [facility]    Education  Barriers To Learning: none  Patient Education: patient educated on goals, PT role and benefits, plan of care, discharge recommendations  Learning Assessment:  patient verbalizes and demonstrates understanding    Assessment  Activity Tolerance: Good  Impairments Requiring Therapeutic Intervention: decreased functional mobility, decreased ADL status, decreased strength, decreased endurance, decreased balance, decreased posture  Prognosis: good  Clinical Assessment: Pt presents as below her baseline function secondary to recent laparoscopic procedure.  Pt requiring increased assistance for bed mobility this date and CGA for mobility. Pt would benefit from skilled PT services to promote safe return to PLOF. Safety Interventions: patient left in chair, chair alarm in place, call light within reach, gait belt, patient at risk for falls, and nurse notified    Plan  Frequency: 3-5 x/per week  Current Treatment Recommendations: strengthening, ROM, balance training, functional mobility training, transfer training, gait training, stair training, endurance training, patient/caregiver education, and safety education    Goals  Patient Goals:  To go to F prior to returning home   Short Term Goals:  Time Frame: Prior to D/C  Patient will complete bed mobility at modified independent   Patient will complete transfers at [facility]   Patient will ambulate 50 ft with use of LRAD at [facility]  Patient will ascend/descend 2 stairs with (R) ascending handrail at supervision    Therapy Session Time      Individual Group Co-treatment   Time In 1352       Time Out 1430       Minutes 38         Timed Code Treatment Minutes:  23 Minutes  Total Treatment Minutes:  38       Electronically Signed By: Jonny Lee PT  Jonny Lee PT, DPT, [id number]

## 2022-10-10 NOTE — PROGRESS NOTES
Nutrition Note    RECOMMENDATIONS  PO Diet: Regular as tolerated  ONS: Ensure Enlive BID     NUTRITION ASSESSMENT   Pt is at risk for nutrition compromise AEB inadequate oral intake with increased nutrient needs for healing s/p surgery. Reports no desire to eat related to feeling weak. No wt hx to review for changes; pt denies any changes. Discussed importance of nutrition & suggested Ensure supplements. Pt agrees to try BID. Will monitor for improved po & supp intake. Nutrition Related Findings: No edema noted; +BS  Wounds: Surgical Incision  Nutrition Education:  Education not indicated   Nutrition Goals: PO intake 50% or greater     MALNUTRITION ASSESSMENT   Acute Illness  Malnutrition Status: At risk for malnutrition (Comment)  Findings of the 6 clinical characteristics of malnutrition:  Energy Intake:  50% or less of estimated energy requirements for 5 or more days  Weight Loss:  No significant weight loss     Body Fat Loss:  No significant body fat loss     Muscle Mass Loss:  No significant muscle mass loss    Fluid Accumulation:  No significant fluid accumulation     Strength:  Not Performed      NUTRITION DIAGNOSIS   Inadequate oral intake related to inadequate protein-energy intake as evidenced by intake 0-25%  Increased nutrient needs related to increase demand for energy/nutrients as evidenced by other (comment) (increased protein needs for healing s/p surgery)      CURRENT NUTRITION THERAPIES  ADULT DIET;  Regular  ADULT ORAL NUTRITION SUPPLEMENT; Breakfast, Dinner; Standard High Calorie/High Protein Oral Supplement     PO Intake: 0%   PO Supplement Intake:None Ordered    ANTHROPOMETRICS  Current Height: 5' 7\" (170.2 cm)  Current Weight: 164 lb 0.4 oz (74.4 kg)    Ideal Body Weight (IBW): 135 lbs  (61 kg)        BMI: 25.7      COMPARATIVE STANDARDS  Energy (kcal):  1480- 1850     Protein (g):  80- 92g       Fluid (mL/day):  1 ml/kcal    The patient will be monitored per nutrition standards of care. Consult dietitian if additional nutrition interventions are needed prior to RD reassessment.      Logan Mccain RD, LD    Contact: [phone]

## 2022-10-11 ENCOUNTER — APPOINTMENT (OUTPATIENT)
Dept: GENERAL RADIOLOGY | Age: 87
DRG: 418 | End: 2022-10-11
Payer: MEDICARE

## 2022-10-11 LAB
A/G RATIO: 0.9 (ref 1.1–2.2)
ALBUMIN SERPL-MCNC: 2.3 G/DL (ref 3.4–5)
ALP BLD-CCNC: 149 U/L (ref 40–129)
ALT SERPL-CCNC: 101 U/L (ref 10–40)
ANION GAP SERPL CALCULATED.3IONS-SCNC: 11 MMOL/L (ref 3–16)
AST SERPL-CCNC: 31 U/L (ref 15–37)
BASOPHILS ABSOLUTE: 0 K/UL (ref 0–0.2)
BASOPHILS RELATIVE PERCENT: 0.3 %
BILIRUB SERPL-MCNC: 1.2 MG/DL (ref 0–1)
BUN BLDV-MCNC: 23 MG/DL (ref 7–20)
CALCIUM SERPL-MCNC: 8.5 MG/DL (ref 8.3–10.6)
CHLORIDE BLD-SCNC: 109 MMOL/L (ref 99–110)
CO2: 17 MMOL/L (ref 21–32)
CREAT SERPL-MCNC: 0.6 MG/DL (ref 0.6–1.2)
EOSINOPHILS ABSOLUTE: 0.3 K/UL (ref 0–0.6)
EOSINOPHILS RELATIVE PERCENT: 3 %
GFR AFRICAN AMERICAN: >60
GFR NON-AFRICAN AMERICAN: >60
GLUCOSE BLD-MCNC: 104 MG/DL (ref 70–99)
HCT VFR BLD CALC: 22.5 % (ref 36–48)
HEMOGLOBIN: 7.5 G/DL (ref 12–16)
LYMPHOCYTES ABSOLUTE: 1.2 K/UL (ref 1–5.1)
LYMPHOCYTES RELATIVE PERCENT: 12.8 %
MCH RBC QN AUTO: 28.9 PG (ref 26–34)
MCHC RBC AUTO-ENTMCNC: 33.3 G/DL (ref 31–36)
MCV RBC AUTO: 86.9 FL (ref 80–100)
MONOCYTES ABSOLUTE: 0.7 K/UL (ref 0–1.3)
MONOCYTES RELATIVE PERCENT: 7.2 %
NEUTROPHILS ABSOLUTE: 7.1 K/UL (ref 1.7–7.7)
NEUTROPHILS RELATIVE PERCENT: 76.7 %
PDW BLD-RTO: 15.1 % (ref 12.4–15.4)
PLATELET # BLD: 210 K/UL (ref 135–450)
PMV BLD AUTO: 8.9 FL (ref 5–10.5)
POTASSIUM SERPL-SCNC: 3.8 MMOL/L (ref 3.5–5.1)
RBC # BLD: 2.59 M/UL (ref 4–5.2)
SODIUM BLD-SCNC: 137 MMOL/L (ref 136–145)
TOTAL PROTEIN: 4.8 G/DL (ref 6.4–8.2)
WBC # BLD: 9.3 K/UL (ref 4–11)

## 2022-10-11 PROCEDURE — 6360000002 HC RX W HCPCS: Performed by: SURGERY

## 2022-10-11 PROCEDURE — 94640 AIRWAY INHALATION TREATMENT: CPT

## 2022-10-11 PROCEDURE — 6370000000 HC RX 637 (ALT 250 FOR IP): Performed by: NURSE PRACTITIONER

## 2022-10-11 PROCEDURE — 2580000003 HC RX 258: Performed by: SURGERY

## 2022-10-11 PROCEDURE — 99024 POSTOP FOLLOW-UP VISIT: CPT | Performed by: SURGERY

## 2022-10-11 PROCEDURE — 6370000000 HC RX 637 (ALT 250 FOR IP): Performed by: SURGERY

## 2022-10-11 PROCEDURE — 80053 COMPREHEN METABOLIC PANEL: CPT

## 2022-10-11 PROCEDURE — 1200000000 HC SEMI PRIVATE

## 2022-10-11 PROCEDURE — APPNB30 APP NON BILLABLE TIME 0-30 MINS: Performed by: NURSE PRACTITIONER

## 2022-10-11 PROCEDURE — 2700000000 HC OXYGEN THERAPY PER DAY

## 2022-10-11 PROCEDURE — 97165 OT EVAL LOW COMPLEX 30 MIN: CPT

## 2022-10-11 PROCEDURE — 36415 COLL VENOUS BLD VENIPUNCTURE: CPT

## 2022-10-11 PROCEDURE — 71046 X-RAY EXAM CHEST 2 VIEWS: CPT

## 2022-10-11 PROCEDURE — 97530 THERAPEUTIC ACTIVITIES: CPT

## 2022-10-11 PROCEDURE — 85025 COMPLETE CBC W/AUTO DIFF WBC: CPT

## 2022-10-11 PROCEDURE — 94669 MECHANICAL CHEST WALL OSCILL: CPT

## 2022-10-11 PROCEDURE — 6370000000 HC RX 637 (ALT 250 FOR IP): Performed by: INTERNAL MEDICINE

## 2022-10-11 PROCEDURE — 94761 N-INVAS EAR/PLS OXIMETRY MLT: CPT

## 2022-10-11 PROCEDURE — 97535 SELF CARE MNGMENT TRAINING: CPT

## 2022-10-11 PROCEDURE — APPSS15 APP SPLIT SHARED TIME 0-15 MINUTES: Performed by: NURSE PRACTITIONER

## 2022-10-11 RX ORDER — DOCUSATE SODIUM 100 MG/1
100 CAPSULE, LIQUID FILLED ORAL 2 TIMES DAILY
Status: DISCONTINUED | OUTPATIENT
Start: 2022-10-11 | End: 2022-10-12 | Stop reason: HOSPADM

## 2022-10-11 RX ORDER — IPRATROPIUM BROMIDE AND ALBUTEROL SULFATE 2.5; .5 MG/3ML; MG/3ML
1 SOLUTION RESPIRATORY (INHALATION) EVERY 4 HOURS PRN
Status: DISCONTINUED | OUTPATIENT
Start: 2022-10-11 | End: 2022-10-12 | Stop reason: HOSPADM

## 2022-10-11 RX ORDER — IPRATROPIUM BROMIDE AND ALBUTEROL SULFATE 2.5; .5 MG/3ML; MG/3ML
1 SOLUTION RESPIRATORY (INHALATION) 2 TIMES DAILY
Status: DISCONTINUED | OUTPATIENT
Start: 2022-10-11 | End: 2022-10-12 | Stop reason: HOSPADM

## 2022-10-11 RX ORDER — POLYETHYLENE GLYCOL 3350 17 G/17G
17 POWDER, FOR SOLUTION ORAL DAILY
Status: DISCONTINUED | OUTPATIENT
Start: 2022-10-11 | End: 2022-10-12 | Stop reason: HOSPADM

## 2022-10-11 RX ADMIN — ATORVASTATIN CALCIUM 10 MG: 10 TABLET, FILM COATED ORAL at 09:29

## 2022-10-11 RX ADMIN — IPRATROPIUM BROMIDE AND ALBUTEROL SULFATE 1 AMPULE: 2.5; .5 SOLUTION RESPIRATORY (INHALATION) at 13:05

## 2022-10-11 RX ADMIN — SODIUM CHLORIDE: 9 INJECTION, SOLUTION INTRAVENOUS at 09:38

## 2022-10-11 RX ADMIN — Medication 1 CAPSULE: at 17:26

## 2022-10-11 RX ADMIN — IPRATROPIUM BROMIDE AND ALBUTEROL SULFATE 1 AMPULE: 2.5; .5 SOLUTION RESPIRATORY (INHALATION) at 07:57

## 2022-10-11 RX ADMIN — PIPERACILLIN AND TAZOBACTAM 3375 MG: 3; .375 INJECTION, POWDER, FOR SOLUTION INTRAVENOUS at 00:57

## 2022-10-11 RX ADMIN — POLYETHYLENE GLYCOL 3350 17 G: 17 POWDER, FOR SOLUTION ORAL at 11:45

## 2022-10-11 RX ADMIN — METOPROLOL SUCCINATE 25 MG: 25 TABLET, EXTENDED RELEASE ORAL at 09:25

## 2022-10-11 RX ADMIN — AMLODIPINE BESYLATE 10 MG: 5 TABLET ORAL at 09:25

## 2022-10-11 RX ADMIN — PIPERACILLIN AND TAZOBACTAM 3375 MG: 3; .375 INJECTION, POWDER, FOR SOLUTION INTRAVENOUS at 09:38

## 2022-10-11 RX ADMIN — IPRATROPIUM BROMIDE AND ALBUTEROL SULFATE 1 AMPULE: 2.5; .5 SOLUTION RESPIRATORY (INHALATION) at 20:47

## 2022-10-11 RX ADMIN — MORPHINE SULFATE 2 MG: 2 INJECTION, SOLUTION INTRAMUSCULAR; INTRAVENOUS at 05:26

## 2022-10-11 RX ADMIN — Medication 10 ML: at 20:51

## 2022-10-11 RX ADMIN — Medication 1 CAPSULE: at 09:29

## 2022-10-11 RX ADMIN — Medication 10 ML: at 20:53

## 2022-10-11 RX ADMIN — Medication 10 ML: at 09:26

## 2022-10-11 RX ADMIN — SODIUM CHLORIDE: 9 INJECTION, SOLUTION INTRAVENOUS at 20:52

## 2022-10-11 RX ADMIN — PIPERACILLIN AND TAZOBACTAM 3375 MG: 3; .375 INJECTION, POWDER, FOR SOLUTION INTRAVENOUS at 17:29

## 2022-10-11 RX ADMIN — DOCUSATE SODIUM 100 MG: 100 CAPSULE, LIQUID FILLED ORAL at 20:51

## 2022-10-11 RX ADMIN — ENOXAPARIN SODIUM 40 MG: 100 INJECTION SUBCUTANEOUS at 09:25

## 2022-10-11 RX ADMIN — DOCUSATE SODIUM 100 MG: 100 CAPSULE, LIQUID FILLED ORAL at 11:45

## 2022-10-11 ASSESSMENT — PAIN SCALES - GENERAL
PAINLEVEL_OUTOF10: 0
PAINLEVEL_OUTOF10: 4
PAINLEVEL_OUTOF10: 0
PAINLEVEL_OUTOF10: 0

## 2022-10-11 ASSESSMENT — PAIN DESCRIPTION - PAIN TYPE: TYPE: SURGICAL PAIN

## 2022-10-11 ASSESSMENT — PAIN DESCRIPTION - LOCATION: LOCATION: ABDOMEN

## 2022-10-11 ASSESSMENT — PAIN DESCRIPTION - DESCRIPTORS: DESCRIPTORS: SORE

## 2022-10-11 NOTE — PROGRESS NOTES
10/11/22 1533   RT Protocol   History Pulmonary Disease 0   Respiratory pattern 0   Breath sounds 2   Cough 0   Bronchodilator Assessment Score 2

## 2022-10-11 NOTE — PROGRESS NOTES
[facility]   Nephrology progress Note  [phone]   [facility]           Reason for Consult:  JOHN , date of admission 10/4/2022  The patient is a 80 y.o. female with significant past medical history of hypertension, diastolic CHF, atrial fibrillation, gallstones removed by ERCP on 10/6/2022 followed by laparoscopic cholecystectomy on 10/7/2022,      She has no history of kidney disease kidney stone disease. Her serum creatinine on 10/8/2022 was 1 with a bun of 31 today the serum creatinine was elevated at 1.9 with a bun of 46 bicarb is 19. She is on antibiotic Zosyn, she is not on a ACE inhibitor/ARB or NSAID  Per records and nurse she has not had NSAIDs recently. Prior to admission she is appropriately on ACE inhibitor lisinopril for her blood pressure  There is been no hypotension  She has been on IV fluids,  oral intake has been poor  She did have a CT scan with IV contrast on 10/5/2022, the renal function decline started between 10/7 and 10/8/2022    Interval  History:    10/10/22: Discussed her care with her daughter. Both the patient and the daughter says she has never had congestive heart failure. She had hypertension for many years and this year was the first time she developed atrial fibrillation  Has no dyspnea no cough no short chest pain  10/11/22: for chest X ray      PHYSICAL EXAM:    Vitals:    BP (!) 127/48   Pulse 81   Temp 97.6 °F (36.4 °C) (Oral)   Resp 16   Ht 5' 7\" (1.702 m)   Wt 164 lb 8 oz (74.6 kg)   LMP 09/01/1962 (Within Years)   SpO2 94%   BMI 25.76 kg/m²   I/O last 3 completed shifts: In: 2094.5 [P.O.:60; I.V.:1912; IV Piggyback:122.5]  Out: 673 [Urine:875]  No intake/output data recorded. Physical Exam:  Gen:  alert, oriented x 3, frail   PERRL , EOM +  Pallor +, No icterus  JVP not raised   CV: RRR no murmur or rub .   Lungs:B/ L air entry, Normal breath sounds , no dullness to percussion either bases  Abd: soft, bowel sounds + , No organomegaly , post op ( laproscopic )  Ext: No edema, no cyanosis  Skin: Warm.   No rash  Neuro: no focal defiict      DATA:    CBC with Differential:    Lab Results   Component Value Date/Time    WBC 9.3 10/11/2022 05:38 AM    RBC 2.59 10/11/2022 05:38 AM    HGB 7.5 10/11/2022 05:38 AM    HCT 22.5 10/11/2022 05:38 AM     10/11/2022 05:38 AM    MCV 86.9 10/11/2022 05:38 AM    MCH 28.9 10/11/2022 05:38 AM    MCHC 33.3 10/11/2022 05:38 AM    RDW 15.1 10/11/2022 05:38 AM    LYMPHOPCT 12.8 10/11/2022 05:38 AM    MONOPCT 7.2 10/11/2022 05:38 AM    BASOPCT 0.3 10/11/2022 05:38 AM    MONOSABS 0.7 10/11/2022 05:38 AM    LYMPHSABS 1.2 10/11/2022 05:38 AM    EOSABS 0.3 10/11/2022 05:38 AM    BASOSABS 0.0 10/11/2022 05:38 AM     CMP:    Lab Results   Component Value Date/Time     10/11/2022 05:38 AM    K 3.8 10/11/2022 05:38 AM     10/11/2022 05:38 AM    CO2 17 10/11/2022 05:38 AM    BUN 23 10/11/2022 05:38 AM    CREATININE 0.6 10/11/2022 05:38 AM    GFRAA >60 10/11/2022 05:38 AM    AGRATIO 0.9 10/11/2022 05:38 AM    LABGLOM >60 10/11/2022 05:38 AM    GLUCOSE 104 10/11/2022 05:38 AM    PROT 4.8 10/11/2022 05:38 AM    LABALBU 2.3 10/11/2022 05:38 AM    CALCIUM 8.5 10/11/2022 05:38 AM    BILITOT 1.2 10/11/2022 05:38 AM    ALKPHOS 149 10/11/2022 05:38 AM    AST 31 10/11/2022 05:38 AM     10/11/2022 05:38 AM     Magnesium:    Lab Results   Component Value Date/Time    MG 1.80 10/05/2022 06:07 AM     Phosphorus:    Lab Results   Component Value Date/Time    PHOS 2.9 10/05/2022 06:07 AM     Uric Acid:  No results found for: LABURIC, URICACID  Last 3 Troponin:  No results found for: TROPONINI  U/A:    Lab Results   Component Value Date/Time    COLORU DARK YELLOW 10/10/2022 12:05 AM    PROTEINU TRACE 10/10/2022 12:05 AM    PHUR 5.0 10/10/2022 12:05 AM    WBCUA 7 10/10/2022 12:05 AM    RBCUA 1 10/10/2022 12:05 AM    BACTERIA None Seen 10/10/2022 12:05 AM    CLARITYU CLOUDY 10/10/2022 12:05 AM    SPECGRAV 1.025 10/10/2022 12:05 AM    LEUKOCYTESUR Negative 10/10/2022 12:05 AM    UROBILINOGEN 1.0 10/10/2022 12:05 AM    BILIRUBINUR Negative 10/10/2022 12:05 AM    BLOODU Negative 10/10/2022 12:05 AM    GLUCOSEU Negative 10/10/2022 12:05 AM           IMPRESSION/RECOMMENDATIONS:      JOHN : The reason for this is unclear at this time. Contrast nephropathy( CT scan 10/5/22)  is a possibility; as her serum creatinine baseline is 0.6, it was 1 on 10 8 and is 1.9 today. Urine analysis Trace  proteinuria , no hematuria   fractional excretion of sodium< 1  Cr 0.6 recovered  Monitor renal function  Stable for discharge from [facility]    Thank you for allowing me to participate in the care of this patient. I will continue to follow along. Please call with questions.     Sharlyn Carrel, MD, MD  10/11/2022  [facility]

## 2022-10-11 NOTE — PLAN OF CARE
Problem: Discharge Planning  Goal: Discharge to home or other facility with appropriate resources  Outcome: Progressing     Problem: Pain  Goal: Verbalizes/displays adequate comfort level or baseline comfort level  Outcome: Progressing   Patient complained of surgical abdominal soreness, rates pain 2/10.  Scheduled tylenol given    Problem: Safety - Adult  Goal: Free from fall injury  Outcome: Progressing     Problem: Chronic Conditions and Co-morbidities  Goal: Patient's chronic conditions and co-morbidity symptoms are monitored and maintained or improved  Outcome: Progressing     Problem: Nutrition Deficit:  Goal: Optimize nutritional status  Outcome: Progressing

## 2022-10-11 NOTE — PROGRESS NOTES
[facility]   Phone: [phone]    Occupational Therapy    [x] Initial Evaluation            [] Daily Treatment Note         [] Discharge Summary      Patient: Morenita Deal   : 3/16/1931   MRN: [id number]   Date of Service:  10/11/2022    Admitting Diagnosis:  Acute cholecystitis  Current Admission Summary: Morenita Deal is a 80 y.o. female who presents to the emergency department today for evaluation for back pain, and abdominal pain. Patient states that she has been experiencing intermittent pain across her back, as well as occasionally to her upper abdomen, and she states that this is actually been ongoing since . The patient states that the pain seems to wax and wane on its own. Patient states that she went to her primary care physician's office today, she did have lab work, EKG performed. She did have a CT of her chest obtained as there was concern for possible chest or back etiology, which did show some edema around the gallbladder, and the patient was sent to the ED for further care and evaluation. Patient is complaining of pain throughout her upper abdomen and into her back, she is rating her pain as an 8/10. The patient states that her pain is sharp. She states that her pain is \"just on the inside\". As she does not feel that it is reproducible to her back. Patient has noted some occasional nausea and decreased appetite but is not any vomiting. No diarrhea. She has no chest pain. She is no shortness of breath. She has no fever or chills. She has no cough or congestion. She has no urinary symptoms. She has a history of atrial fibrillation and is anticoagulated on Eliquis. Patient denies any past surgical history to her abdomen. She denies any fall or injury, no other complaints.     S/p cholecystectomy 10/7/22    Past Medical History:  has a past medical history of Atrial fibrillation ([address] Diastolic CHF ([address] and Hypertension. Past Surgical History:  has a past surgical history that includes ERCP (N/A, 10/6/2022) and Cholecystectomy, laparoscopic (N/A, 10/7/2022). Discharge Recommendations: Danielle Phillips scored a 19/24 on the AM-PAC ADL Inpatient form. Current research shows that an AM-PAC score of 17 or less is typically not associated with a discharge to the patient's home setting. However, due to current oxygen needs, desat with functional mobility, and the patient living at home without assist, it is recommended that the patient have 3-5 sessions per week of Occupational Therapy at d/c to increase the patient's independence. Please see assessment section for further patient specific details. If patient discharges prior to next session this note will serve as a discharge summary. Please see below for the latest assessment towards goals. DME Required For Discharge: may benefit from toilet raiser or BSC placed over toilet    Precautions/Restrictions: high fall risk  Weight Bearing Restrictions: no restrictions  [] Right Upper Extremity  [] Left Upper Extremity [] Right Lower Extremity  [] Left Lower Extremity     Required Braces/Orthotics: no braces required   [] Right  [] Left  Positional Restrictions:no positional restrictions      Pre-Admission Information     Lives With: alone                     Type of Home: house  Home Layout: one level, laundry in basement, able to live on main level  Home Access:  2 step to enter with handrail. Handrails are located on L side. Bathroom Layout: tub/shower unit, walk in shower (wall-in shower is in basement.  Pt typically uses walk-in shower)  Bathroom Equipment: shower chair, hand held shower head  Toilet Height: standard height  Home Equipment: standard walker, single point cane, reacher, sock aide  Transfer Assistance: Independent without use of device  Ambulation Assistance:Independent without use of device, modified independent with use of SPC  (Pt reports use of cane more in last 2-3 weeks, but states she has mostly \"been carrying it around\")  ADL Assistance: independent with all ADL's  IADL Assistance: independent with homemaking tasks, requires assistance with cleaning(Pt has house-cleaning services)  Active :        [x] Yes                 [] No  Hand Dominance: [] Left                 [x] Right  Current Employment: retired. Occupation:   Hobbies: Watch TV, call friends on phone  Recent Falls: Pt denies recent falls      Examination     Vision:   Vision Gross Assessment: Impaired and Vision Corrective Device: wears glasses for reading  Hearing:   WFL  Perception:   WFL  Posture:   Rounded shoulders, mild forward flexion  Sensation:   Reports n/t in BLEs  Proprioception:    WFL  Tone:   Normotonic  Coordination Testing:   WFL    ROM:   (B) UE AROM WFL  Strength:   (B) UE strength grossly WFL    Decision Making: low complexity  Clinical Presentation: evolving      Subjective    General: Pt seated in chair upon entry; pleasant and agreeable to eval. Requesting ADLs. Pain: Patient does not rate upon questioning  Pain Interventions: repositioned         Activities of Daily Living    Basic Activities of Daily Living  Grooming: stand by assistance  Grooming Comments: oral care and washing hands in stance at sink  Toileting: stand by assistance. Toileting Comments: voided bladder at toilet and completed pericare  General Comments: declined additional ADLs  Instrumental Activities of Daily Living  No IADL completed on this date. Functional Mobility    Bed Mobility  Bed mobility not completed on this date. Comments: Pt in chair upon entry and exit.    Transfers  Sit to stand transfer:contact guard assistance, minimal assistance  Stand to sit transfer: contact guard assistance, minimal assistance  Toilet transfer: minimal assistance  Toilet transfer equipment: grab bars  Toilet transfer comments: use of R grab bar for sit>stand from toilet with min A due to low height   Comments: chair>ambulating>toilet>ambulating>chair  Functional Mobility:  Sitting Balance: supervision. Standing Balance: contact guard assistance. Functional Mobility: .  contact guard assistance  Functional Mobility Activity: to/from bathroom, + ~15 ft in room    Functional Mobility Device Use: single point cane  Functional Mobility Comment: no LOB; SOB noted following ambulation; desat to 86% noted on room air with rapid recovery to 95% within 60 seconds on 2 L O2     Other Therapeutic Interventions      Functional Outcomes  AM-PAC Inpatient Daily Activity Raw Score: 19      Cognition  WFL  Orientation:    alert and oriented x 4  Command Following:   [facility]     Education    Barriers To Learning: none  Patient Education: patient educated on goals, OT role and benefits, plan of care, transfer training, discharge recommendations  Learning Assessment:  patient verbalizes and demonstrates understanding    Assessment    Activity Tolerance: note desat in functional mobility  Impairments Requiring Therapeutic Intervention: decreased functional mobility, decreased ADL status, decreased endurance, decreased IADL  Prognosis: good  Clinical Assessment: Pt presents below baseline level of function and is limited in the above areas impacting independence in functional mobility and ADLs. Pt would benefit from skilled inpatient OT services to address these deficits. On this date, desat during functional mobility and min A required for toilet transfer affecting pt safety if she was to d/c home alone.    Safety Interventions: patient left in chair, chair alarm in place, call light within reach, nurse notified, and family/caregiver present       Plan    Frequency: 3-5 x/per week  Current Treatment Recommendations: functional mobility training, transfer training, endurance training, patient/caregiver education, and ADL/self-care training    Goals    Patient Goals: get stronger     Short Term Goals:  Time Frame: d/c  Patient will complete upper body ADL at modified independent   Patient will complete lower body ADL at supervision   Patient will complete functional transfers at supervision, with SpO2 at or above 90%   Patient will complete functional mobility at supervision, with SpO2 at or above 90%   Patient will complete bed mobility at stand by assistance        Therapy Session Time     Individual Group Co-treatment   Time In 1208      Time Out 1246      Minutes 38           Timed Code Treatment Minutes:  Timed Code Treatment Minutes: 23 Minutes    Total Treatment Minutes:  38    Electronically Signed By: EMANUEL Good/MARY KATE, C/NDT ([id number])

## 2022-10-11 NOTE — PROGRESS NOTES
Patients head to toe assessment completed. Vital signs WNL. Bed alarm engaged, call light within reach. Scheduled medications given per MAR. Patient complained of abdominal surgical pain, scheduled tylenol given. Patient resting in bed. Will continue to monitor.

## 2022-10-11 NOTE — PROGRESS NOTES
[facility]        Progress Note    Patient Name: Larisa Villanueva  MRN: [id number]  YOB: 1931  Date of Evaluation: 10/11/2022    Postoperative Day #4    Subjective:  No acute events overnight  Pain controlled  No nausea or vomiting, continues to report belching, tolerating regular diet--appetite fair  Passing some flatus, no stool  Up to chair at this time; reports feeling better out of bed      Vital Signs:  Patient Vitals for the past 24 hrs:   BP Temp Temp src Pulse Resp SpO2 Height Weight   10/11/22 0915 (!) 127/48 -- Oral 81 16 94 % -- --   10/11/22 0757 -- -- -- 69 16 93 % -- --   10/11/22 0515 (!) 149/63 97.6 °F (36.4 °C) Oral 65 16 92 % 5' 7\" (1.702 m) 164 lb 8 oz (74.6 kg)   10/11/22 0049 117/69 97.5 °F (36.4 °C) Oral 60 16 95 % -- --   10/10/22 2007 -- -- -- 67 17 94 % -- --   10/10/22 2003 125/64 97.5 °F (36.4 °C) Oral 68 16 96 % -- --   10/10/22 1600 120/62 98 °F (36.7 °C) Oral 65 16 93 % -- --   10/10/22 1230 (!) 113/49 97.8 °F (36.6 °C) Oral 66 16 93 % -- --   10/10/22 1223 -- -- -- -- -- 93 % -- --        TEMPERATURE HISTORY 24H: Temp (24hrs), Av.7 °F (36.5 °C), Min:97.5 °F (36.4 °C), Max:98 °F (36.7 °C)    BLOOD PRESSURE HISTORY: Systolic (23HTK), ALLAN:600 , Min:113 , NBY:514    Diastolic (04XNZ), ROT:76, Min:48, Max:69      Intake/Output:  I/O last 3 completed shifts: In: . [P.O.:60; I.V.:1912; IV Piggyback:122.5]  Out: 451 [Urine:875]  No intake/output data recorded.   Drain/tube Output:       Physical Exam:  General: awake, alert, oriented to person, place, time  Lungs: unlabored respirations  Abdomen: soft, mildly distended, incisional tenderness only, bowel sounds present  Skin/Wound: healing well, no drainage, no erythema, ecchymosis noted, well approximated    Labs:  CBC:    Recent Labs     10/09/22  0537 10/10/22  0635 10/10/22  1304 10/11/22  0538   WBC 12.6* 8.9  --  9.3   HGB 8.2* 7.1* 7.5* 7.5*   HCT 25.3* 21.9* 23.3* 22.5*  170  --  210       BMP:    Recent Labs     10/09/22  0537 10/10/22  0635 10/11/22  0538    138 137   K 4.2 3.9 3.8    111* 109   CO2 19* 20* 17*   BUN 46* 38* 23*   CREATININE 1.9* 1.2 0.6   GLUCOSE 131* 88 104*       Hepatic:    Recent Labs     10/09/22  0537 10/10/22  0635 10/11/22  0538   AST 68* 44* 31   * 134* 101*   BILITOT 1.2* 1.0 1.2*   ALKPHOS 166* 151* 149*       Amylase:  No results found for: AMYLASE  Lipase:    Lab Results   Component Value Date/Time    LIPASE 25.0 10/07/2022 05:52 AM    LIPASE 51.0 10/05/2022 06:07 AM    LIPASE 38.0 10/04/2022 08:09 PM        Mag:    Lab Results   Component Value Date/Time    MG 1.80 10/05/2022 06:07 AM     Phos:     Lab Results   Component Value Date/Time    PHOS 2.9 10/05/2022 06:07 AM      Coags:   Lab Results   Component Value Date/Time    PROTIME 16.2 10/06/2022 04:48 AM    INR 1.31 10/06/2022 04:48 AM    APTT 34.8 10/05/2022 06:07 AM       Cultures:  Anaerobic culture  No results found for: LABANAE  Fungus stain  No results found for requested labs within last 30 days. Gram stain  No results found for requested labs within last 30 days. Organism  No results found for: [facility]  Surgical culture  No results found for: CXSURG  Blood culture 1  No results found for requested labs within last 30 days. Blood culture 2  No results found for requested labs within last 30 days. Fecal occult  No results found for requested labs within last 30 days. GI bacterial pathogens by PCR  No results found for requested labs within last 30 days. C. difficile  No results found for requested labs within last 30 days. Urine culture  No results found for: LABURIN    Pathology:  OR 10/7/2022--FINAL DIAGNOSIS:     Gallbladder, cholecystectomy:   - Chronic cholecystitis with fibrosis. - Cholelithiasis. Imaging:  I have personally reviewed the following films:    No results found.     Scheduled Meds:   ipratropium-albuterol  1 ampule Inhalation Q4H WA    sodium chloride flush  5-40 mL IntraVENous 2 times per day    enoxaparin  40 mg SubCUTAneous Daily    acetaminophen  650 mg Oral Q6H    amLODIPine  10 mg Oral Daily    [Held by provider] apixaban  5 mg Oral BID    sodium chloride flush  10 mL IntraVENous 2 times per day    lactobacillus  1 capsule Oral BID WC    metoprolol succinate  25 mg Oral Daily    atorvastatin  10 mg Oral Daily    piperacillin-tazobactam  3,375 mg IntraVENous Q8H     Continuous Infusions:   sodium chloride      sodium chloride 100 mL/hr at 10/11/22 0938     PRN Meds:.sodium chloride flush, sodium chloride, ondansetron **OR** ondansetron, morphine **OR** morphine, oxyCODONE **OR** oxyCODONE, sodium chloride flush, sodium chloride, acetaminophen **OR** acetaminophen, senna, ondansetron, morphine **OR** morphine      Assessment:  80 y.o. female admitted with   1. Acute cholecystitis    2. Transaminitis        OR Date 10/7/2022, laparoscopic cholecystectomy for acute cholecystitis  Procedure Date 10/6/2022, ERCP with stone removal for choledocholithiasis  Atrial fibrillation  CHF  Medical coagulopathy, on Eliquis--currently held  Acute kidney injury      Plan:  1. Pain controlled, no nausea/vomiting but continues to have belching, tolerating regular diet--appetite fair, passing flatus but no BM, abdomen soft but mildly distended on exam--stable, vitals/labs stable, creatinine returned to baseline, hemoglobin stable; continued supportive care, add bowel regimen  2. Regular diet with supplements as tolerated; monitor bowel function  3. IV hydration and electrolyte correction per nephrology  4. Antibiotics  5. Activity as tolerated, ambulate TID, up to chair for meals--PT/OT following  6. Pulmonary toilet, incentive spirometry--wean O2 as able  7. PRN analgesics and antiemetics--minimizing narcotics as tolerated, transition to PO, apply ICE  8. DVT prophylaxis with  Lovenox and SCD's; hold Eliquis  9.  Management of medical comorbid etiologies per primary team and consulting services  10. Disposition: Discharge when stable medically; may need SNF at discharge--social work consulted    EDUCATION:  Educated patient on plan of care and disease process--all questions answered. Plans discussed with patient and nursing. Reviewed and discussed with Dr. Sonia Gallegos. Signed:  Bere AlvarezTIM - CNP  10/11/2022 9:44 AM    I have personally performed a face to face diagnostic evaluation on this patient. I have interviewed and examined the patient and I agree with the assessment above. Time was spent reviewing patient chart (including but not limited to notes, labs, imaging and other testing), interviewing and counseling patient and present family members, performing physical exam, documentation of my findings and subsequent follow up of ordered medication and testing, placing referrals and communication with patient care providers, coordinating future care as well as documentation in the EHR. This encompassed more than 50% of the total encounter time. In summary, my findings and plan are the following:   Ms. Cindy Gomes is a 80 y.o. female who presents with   OR Date 10/7/2022, laparoscopic cholecystectomy for acute cholecystitis  Procedure Date 10/6/2022, ERCP with stone removal for choledocholithiasis  Atrial fibrillation  CHF  Medical coagulopathy, on Eliquis--currently held  Acute kidney injury     Plan:  1. Tolerating diet, encourage PO intake as nutrition critical for wound healing  2. Increase activity, pulmonary toilet, incentive spirometry, wean oxygen  3. Antibiotics  4. IVF, electrolytes per nephrology  5. Anemia, hemoglobin stable  6. LFT's reassuring, trend  7. Pain control, transition to PO and minimize narcotics  8. Defer management of remainder of medical comorbidities to primary and consulting teams  9. Discharge planning, ECF planning underway    [address]  Sonia Gallegos MD, FACS  10/11/2022  12:03 PM

## 2022-10-11 NOTE — PROGRESS NOTES
Physician Progress Note      Sawyer Nicolas  [facility] #:                  [id number]  :                       3/16/1931  ADMIT DATE:       10/4/2022 7:36 PM  [address] [place] DATE:  RESPONDING  PROVIDER #:        Darrick Caro MD          QUERY TEXT:    Pt admitted with cholecystitis. Noted documentation of hypoxia. ? Please   document in progress notes and discharge summary if you are   evaluating/treating any of the following: The medical record reflects the following:  Risk Factors: 81 yo S/P cholecystectomy, hx of Diastolic CHF  Clinical Indicators: Per Pulmonology: Postop seen to have mild hypoxia with   requirement of 2 L/min of oxygen supplementation. attempted to wean X2 w/   sats dropping to 86%, 83%. Per PN 10/9: Mild dyspnea,. Treatment: Pulmonary consult, RT monitoring, Percussive aerosol nebulizer, 2L,   CXR  Options provided:  -- Acute? Postoperative Pulmonary Insufficiency  -- Atelectasis and hypoxia post operative  -- Other - I will add my own diagnosis  -- Disagree - Not applicable / Not valid  -- Disagree - Clinically unable to determine / Unknown  -- Refer to Clinical Documentation Reviewer    PROVIDER RESPONSE TEXT:    This patient has post operative hypoxia and atelectasis.     Query created by: Nicholas Dumas on 10/11/2022 10:42 AM      Electronically signed by:  Darrick Caro MD 10/11/2022 10:54 AM

## 2022-10-11 NOTE — CARE COORDINATION
Discharge Planning;  Patient and family requested SNF referral sent to [facility]- phone [phone]. Fax ; [phone]-  referral faxed. Addendum; [facility] and [facility] can accept patient. Patient and family would like to go to [facility] on discharge.

## 2022-10-11 NOTE — PROGRESS NOTES
[facility]    10/11/2022 11:34 AM        Name: Gardenia Vegas . Admitted: 10/4/2022  Primary Care Provider: No primary care provider on file.  (Tel: None)      Subjective:    Denies any chest pain nausea or vomiting tolerating p.o. diet no fevers or chills  Reviewed interval ancillary notes    Current Medications  docusate sodium (COLACE) capsule 100 mg, BID  polyethylene glycol (GLYCOLAX) packet 17 g, Daily  ipratropium-albuterol (DUONEB) nebulizer solution 1 ampule, Q4H WA  sodium chloride flush 0.9 % injection 5-40 mL, 2 times per day  sodium chloride flush 0.9 % injection 5-40 mL, PRN  0.9 % sodium chloride infusion, PRN  enoxaparin (LOVENOX) injection 40 mg, Daily  ondansetron (ZOFRAN-ODT) disintegrating tablet 4 mg, Q8H PRN   Or  ondansetron (ZOFRAN) injection 4 mg, Q6H PRN  morphine (PF) injection 2 mg, Q2H PRN   Or  morphine injection 4 mg, Q2H PRN  oxyCODONE (ROXICODONE) immediate release tablet 5 mg, Q4H PRN   Or  oxyCODONE (ROXICODONE) immediate release tablet 10 mg, Q4H PRN  acetaminophen (TYLENOL) tablet 650 mg, Q6H  amLODIPine (NORVASC) tablet 10 mg, Daily  [Held by provider] apixaban (ELIQUIS) tablet 5 mg, BID  sodium chloride flush 0.9 % injection 10 mL, 2 times per day  sodium chloride flush 0.9 % injection 10 mL, PRN  0.9 % sodium chloride infusion, PRN  acetaminophen (TYLENOL) tablet 650 mg, Q6H PRN   Or  acetaminophen (TYLENOL) suppository 650 mg, Q6H PRN  senna (SENOKOT) tablet 8.6 mg, Daily PRN  ondansetron (ZOFRAN) injection 4 mg, Q6H PRN  lactobacillus (CULTURELLE) capsule 1 capsule, BID WC  morphine (PF) injection 2 mg, Q3H PRN   Or  morphine injection 4 mg, Q3H PRN  metoprolol succinate (TOPROL XL) extended release tablet 25 mg, Daily  atorvastatin (LIPITOR) tablet 10 mg, Daily  piperacillin-tazobactam (ZOSYN) 3,375 mg in dextrose 5 % 50 mL IVPB (mini-bag), Q8H      Objective:  BP (!) 127/48   Pulse 81   Temp 97.6 °F (36.4 °C) (Oral)   Resp 16   Ht 5' 7\" (1.702 m)   Wt 164 lb 8 oz (74.6 kg)   LMP 09/01/1962 (Within Years)   SpO2 94%   BMI 25.76 kg/m²     Intake/Output Summary (Last 24 hours) at 10/11/2022 1134  Last data filed at 10/11/2022 0515  Gross per 24 hour   Intake 60 ml   Output 700 ml   Net -640 ml        Wt Readings from Last 3 Encounters:   10/11/22 164 lb 8 oz (74.6 kg)       General appearance:  Appears comfortable. AAOx3  HEENT: atraumatic, Pupils equal, muscous membranes moist, no masses appreciated  Cardiovascular: Regular rate and rhythm no murmurs appreciated  Respiratory: CTAB no wheezing  Gastrointestinal: Abdomen soft, non-tender, BS+  EXT: no edema  Neurology: no gross focal deficts  Psychiatry: Appropriate affect. Not agitated  Skin: Warm, dry, no rashes appreciated    Labs and Tests:  CBC:   Recent Labs     10/09/22  0537 10/10/22  0635 10/10/22  1304 10/11/22  0538   WBC 12.6* 8.9  --  9.3   HGB 8.2* 7.1* 7.5* 7.5*    170  --  210       BMP:    Recent Labs     10/09/22  0537 10/10/22  0635 10/11/22  0538    138 137   K 4.2 3.9 3.8    111* 109   CO2 19* 20* 17*   BUN 46* 38* 23*   CREATININE 1.9* 1.2 0.6   GLUCOSE 131* 88 104*       Hepatic:   Recent Labs     10/09/22  0537 10/10/22  0635 10/11/22  0538   AST 68* 44* 31   * 134* 101*   BILITOT 1.2* 1.0 1.2*   ALKPHOS 166* 151* 149*       XR CHEST PORTABLE   Final Result   Moderate-sized right basilar pleural effusion with atelectasis. Mild left basilar atelectasis. FL ERCP BILIARY AND PANCREATIC S&I   Final Result   ERCP images as above. .  Please refer to the procedure report for further   details. MRI ABDOMEN W WO CONTRAST MRCP   Final Result   Findings suggestive of acute cholecystitis. No suspicious gallbladder mass   seen as suggested on prior ultrasound. Multiple gallstones are seen.       There is a small filling defect seen in the distal common duct, either small   amount of sludge or tiny stone. CT ABDOMEN PELVIS W IV CONTRAST Additional Contrast? None   Final Result   Cholelithiasis. Gallbladder wall thickening, pericholecystic fat stranding   and trace fluid suggest acute cholecystitis. Mild to moderate common bile duct dilatation extending to the ampulla. Choledocholithiasis must be considered. Follow-up MRCP would be helpful in   further evaluation. Sigmoid colon diverticulosis. No evidence of diverticulitis. 2.5 cm left ovarian cyst.  Follow-up recommendation as below. RECOMMENDATIONS:   Managing Incidental Adnexal Cystic Mass by CT or MR      Late postmenopausal      < or equal to 3 cm: no follow up      >3 cm: US promptly      Reference:      Min Boswell al. Managing Incidental Findings on Abdominal CT: White Paper of   the ACR Incidental Findings Committee. J Am Lo Radiol 2010;7:754-773         US GALLBLADDER RUQ   Final Result   1. Cholelithiasis is identified, with minimal pericholecystic fluid. There   is also a focal area of masslike thickening and increased echogenicity as   well as posterior q.6 shadowing in a non dependent location involving the   fundus of the gallbladder. A gallbladder mass cannot be excluded. Recommend   further evaluation with dedicated CT imaging of the abdomen and pelvis with   contrast.   2. Dilation of the common bile duct. This can be assessed on CT imaging as   well. 3. There are cysts associated with the right kidney, as well as questionable   hydronephrosis. This can be assessed on CT imaging. FLUORO FOR SURGICAL PROCEDURES    (Results Pending)   XR CHEST (2 VW)    (Results Pending)       Recent imaging reviewed    Problem List  Principal Problem:    Acute cholecystitis  Resolved Problems:    * No resolved hospital problems.  *       Assessment & Plan:   Acute cholecystitis   S/p cholecystectomy  - surgery on board    Hypoxia:   Continue incentive spriometry  - check xray    JOHN: improved    PAF: home meds restart eliquis once ok with surgery    Chronic diastolic chf    Htn: montior    Anemia of postoperative blood loss  stable      Wilma Kang MD   10/11/2022 11:34 AM

## 2022-10-11 NOTE — PLAN OF CARE
Problem: Discharge Planning  Goal: Discharge to home or other facility with appropriate resources  10/11/2022 1254 by Amelie Severs, RN  Outcome: Progressing  10/11/2022 0117 by Evangelista Fuchs RN  Outcome: Progressing     Problem: Pain  Goal: Verbalizes/displays adequate comfort level or baseline comfort level  10/11/2022 1254 by Amelie Severs, RN  Outcome: Progressing  10/11/2022 0117 by Evangelista Fuchs RN  Outcome: Progressing     Problem: Safety - Adult  Goal: Free from fall injury  10/11/2022 1254 by Amelie Severs, RN  Outcome: Progressing       Problem: ABCDS Injury Assessment  Goal: Absence of physical injury  10/11/2022 1254 by Amelie Severs, RN  Outcome: Progressing       Problem: Chronic Conditions and Co-morbidities  Goal: Patient's chronic conditions and co-morbidity symptoms are monitored and maintained or improved  10/11/2022 1254 by Amelie Severs, RN  Outcome: Progressing       Problem: Nutrition Deficit:  Goal: Optimize nutritional status  10/11/2022 1254 by Amelie Severs, RN  Outcome: Progressing

## 2022-10-11 NOTE — RT PROTOCOL NOTE
RT Inhaler-Nebulizer Bronchodilator Protocol Note    There is a bronchodilator order in the chart from a provider indicating to follow the RT Bronchodilator Protocol and there is an Initiate RT Inhaler-Nebulizer Bronchodilator Protocol order as well (see protocol at bottom of note). CXR Findings:  No results found. The findings from the last RT Protocol Assessment were as follows:   History Pulmonary Disease: None or smoker <15 pack years  Respiratory Pattern: Regular pattern and RR 12-20 bpm  Breath Sounds: Slightly diminished and/or crackles  Cough: Strong, spontaneous, non-productive  Indication for Bronchodilator Therapy:    Bronchodilator Assessment Score: 2    Aerosolized bronchodilator medication orders have been revised according to the RT Inhaler-Nebulizer Bronchodilator Protocol below. Respiratory Therapist to perform RT Therapy Protocol Assessment initially then follow the protocol. Repeat RT Therapy Protocol Assessment PRN for score 0-3 or on second treatment, BID, and PRN for scores above 3. No Indications - adjust the frequency to every 6 hours PRN wheezing or bronchospasm, if no treatments needed after 48 hours then discontinue using Per Protocol order mode. If indication present, adjust the RT bronchodilator orders based on the Bronchodilator Assessment Score as indicated below. Use Inhaler orders unless patient has one or more of the following: on home nebulizer, not able to hold breath for 10 seconds, is not alert and oriented, cannot activate and use MDI correctly, or respiratory rate 25 breaths per minute or more, then use the equivalent nebulizer order(s) with same Frequency and PRN reasons based on the score. If a patient is on this medication at home then do not decrease Frequency below that used at home.     0-3 - enter or revise RT bronchodilator order(s) to equivalent RT Bronchodilator order with Frequency of every 4 hours PRN for wheezing or increased work of breathing using Per Protocol order mode. 4-6 - enter or revise RT Bronchodilator order(s) to two equivalent RT bronchodilator orders with one order with BID Frequency and one order with Frequency of every 4 hours PRN wheezing or increased work of breathing using Per Protocol order mode. 7-10 - enter or revise RT Bronchodilator order(s) to two equivalent RT bronchodilator orders with one order with TID Frequency and one order with Frequency of every 4 hours PRN wheezing or increased work of breathing using Per Protocol order mode. 11-13 - enter or revise RT Bronchodilator order(s) to one equivalent RT bronchodilator order with QID Frequency and an Albuterol order with Frequency of every 4 hours PRN wheezing or increased work of breathing using Per Protocol order mode. Greater than 13 - enter or revise RT Bronchodilator order(s) to one equivalent RT bronchodilator order with every 4 hours Frequency and an Albuterol order with Frequency of every 2 hours PRN wheezing or increased work of breathing using Per Protocol order mode. RT to enter RT Home Evaluation for COPD & MDI Assessment order using Per Protocol order mode.     Electronically signed by Rubens Hodges RCP on 10/11/2022 at 3:34 PM

## 2022-10-11 NOTE — PROGRESS NOTES
[facility]   Phone: [phone]    Physical Therapy    [] Initial Evaluation            [x] Daily Treatment Note         [] Discharge Summary      Patient: Libby Ivey   : 3/16/1931   MRN: [id number]   Date of Service:  10/11/2022  Admitting Diagnosis: Acute cholecystitis  Current Admission Summary: Libby Ivey is a 80 y.o. female who presents to the emergency department today for evaluation for back pain, and abdominal pain. Patient states that she has been experiencing intermittent pain across her back, as well as occasionally to her upper abdomen, and she states that this is actually been ongoing since . The patient states that the pain seems to wax and wane on its own. Patient states that she went to her primary care physician's office today, she did have lab work, EKG performed. She did have a CT of her chest obtained as there was concern for possible chest or back etiology, which did show some edema around the gallbladder, and the patient was sent to the ED for further care and evaluation. Patient is complaining of pain throughout her upper abdomen and into her back, she is rating her pain as an 8/10. The patient states that her pain is sharp. She states that her pain is \"just on the inside\". As she does not feel that it is reproducible to her back. Patient has noted some occasional nausea and decreased appetite but is not any vomiting. No diarrhea. She has no chest pain. She is no shortness of breath. She has no fever or chills. She has no cough or congestion. She has no urinary symptoms. She has a history of atrial fibrillation and is anticoagulated on Eliquis. Patient denies any past surgical history to her abdomen. She denies any fall or injury, no other complaints. Past Medical History:  has a past medical history of Atrial fibrillation ([address] Diastolic CHF ([address] and Hypertension.   Past Surgical History:  has a past surgical history that includes ERCP (N/A, 10/6/2022) and Cholecystectomy, laparoscopic (N/A, 10/7/2022). Discharge Recommendations: Mellisa Lu scored a 17/24 on the AM-PAC short mobility form. Current research shows that an AM-PAC score of 17 or less is typically not associated with a discharge to the patient's home setting. Based on the patient's AM-PAC score and their current functional mobility deficits, it is recommended that the patient have 3-5 sessions per week of Physical Therapy at d/c to increase the patient's independence. Please see assessment section for further patient specific details. If patient discharges prior to next session this note will serve as a discharge summary. Please see below for the latest assessment towards goals. If discharges home, recommend [address] [place] [address]: LEVEL 3 SAFETY     - Initial home health evaluation to occur within 24-48 hours, in patient home   - Therapy evaluations in home within 24-48 hours of discharge; including DME and home safety   - Frontload therapy 5 days, then 3x a week   - Therapy to evaluate if patient has [address] needs for personal care   -  evaluation within 24-48 hours, includes evaluation of resources and insurance to determine [place], [place], LTC, and Medicaid options       DME Required For Discharge: DME to be determined pending patient progress, rolling walker  Precautions/Restrictions: high fall risk  Weight Bearing Restrictions: no restrictions  [] Right Upper Extremity  [] Left Upper Extremity [] Right Lower Extremity  [] Left Lower Extremity     Required Braces/Orthotics: no braces required   [] Right  [] Left  Positional Restrictions:no positional restrictions    Pre-Admission Information   Lives With: alone    Type of Home: house  Home Layout: one level, laundry in basement, able to live on main level  Home Access:  2 step to enter with handrail. Handrails are located on L side.   Bathroom Layout: tub/shower unit, walk in shower (wall-in shower is in basement. Pt typically uses walk-in shower)  Bathroom Equipment: shower chair, hand held shower head  Toilet Height: standard height  Home Equipment: standard walker, single point cane, reacher, sock aide  Transfer Assistance: Independent without use of device  Ambulation Assistance:Independent without use of device, modified independent with use of SPC  (Pt reports use of cane more in last 2-3 weeks, but states she has mostly \"been carrying it around\")  ADL Assistance: independent with all ADL's  IADL Assistance: independent with homemaking tasks, requires assistance with cleaning(Pt has house-cleaning services)  Active :        [x] Yes  [] No  Hand Dominance: [] Left  [x] Right  Current Employment: retired. Occupation:   Hobbies: Watch TV, call friends on phone  Recent Falls: Pt denies recent falls    Examination   Vision:   Vision Gross Assessment: Impaired and Vision Corrective Device: wears glasses for reading  Hearing:   [employer]        Subjective  General: seated in chair at arrival. On 3L O2 at 98%. Agreed to therapy. Per chart, pt/family requesting SNF consult. State back hurts no worse or better than her chronic pain at home. Pain: 0/10   Pain Interventions: not applicable       Functional Mobility  Bed Mobility  Bed mobility not completed on this date. Comments:  Transfers  Sit to stand transfer: contact guard assistance  Stand to sit transfer: contact guard assistance  Comments: x 2 reps from chair   Ambulation  Surface:level surface  Assistive Device: rolling walker  Assistance: contact guard assistance  Distance: 40 ft   Gait Mechanics: slower palmira, small step length and height   Comments:    Stair Mobility  Stair mobility not completed on this date. Comments:  Wheelchair Mobility:  No w/c mobility completed on this date.   Comments:  Balance  Static Sitting Balance: fair (+): maintains balance at SBA/supervision without use of UE support  Dynamic Sitting Balance: fair (+): maintains balance at SBA/supervision without use of UE support  Static Standing Balance: fair (-): maintains balance at CGA with use of UE support  Dynamic Standing Balance: fair (-): maintains balance at CGA with use of UE support  Comments:    Other Therapeutic Interventions  Standing at RW marching x 20 and B HR x 20. SpO2 % on 3L throughout    Functional Outcomes  AM-PAC Inpatient Mobility Raw Score : 17              Cognition  WFL  Orientation:    alert and oriented x 4  Command Following:   [facility]  Barriers To Learning: none  Patient Education: patient educated on goals, PT role and benefits, plan of care, discharge recommendations  Learning Assessment:  patient verbalizes and demonstrates understanding    Assessment  Activity Tolerance: Good  Impairments Requiring Therapeutic Intervention: decreased functional mobility, decreased ADL status, decreased strength, decreased endurance, decreased balance, decreased posture  Prognosis: good  Clinical Assessment: Pt currently needing supplement O2 to maintain saturation levels with mobility. Decreased endurance for standing activities and only able to walk short distances prior to fatigue with use of RW. Continued skilled PT to promote return towards PLOF. Safety Interventions: patient left in chair, chair alarm in place, call light within reach, gait belt, patient at risk for falls, and nurse notified    Plan  Frequency: 3-5 x/per week  Current Treatment Recommendations: strengthening, ROM, balance training, functional mobility training, transfer training, gait training, stair training, endurance training, patient/caregiver education, and safety education    Goals  Patient Goals:  To go to ECF prior to returning home   Short Term Goals:  Time Frame: Prior to D/C  Patient will complete bed mobility at modified independent   Patient will complete transfers at [facility]   Patient will ambulate 48 ft with use of LRAD at modified independent  Patient will ascend/descend 2 stairs with (R) ascending handrail at supervision    No goals met 10/11    Therapy Session Time      Individual Group Co-treatment   Time In 1534       Time Out 1549       Minutes 15         Timed Code Treatment Minutes:  15 Minutes  Total Treatment Minutes:  15       Electronically Signed By: Paco Hays, PT

## 2022-10-12 VITALS
DIASTOLIC BLOOD PRESSURE: 50 MMHG | RESPIRATION RATE: 18 BRPM | BODY MASS INDEX: 25.66 KG/M2 | OXYGEN SATURATION: 94 % | HEART RATE: 76 BPM | HEIGHT: 67 IN | WEIGHT: 163.5 LBS | TEMPERATURE: 97.4 F | SYSTOLIC BLOOD PRESSURE: 134 MMHG

## 2022-10-12 LAB
A/G RATIO: 1 (ref 1.1–2.2)
ALBUMIN SERPL-MCNC: 2.6 G/DL (ref 3.4–5)
ALP BLD-CCNC: 162 U/L (ref 40–129)
ALT SERPL-CCNC: 83 U/L (ref 10–40)
ANION GAP SERPL CALCULATED.3IONS-SCNC: 10 MMOL/L (ref 3–16)
AST SERPL-CCNC: 27 U/L (ref 15–37)
BASOPHILS ABSOLUTE: 0 K/UL (ref 0–0.2)
BASOPHILS RELATIVE PERCENT: 0 %
BILIRUB SERPL-MCNC: 1.6 MG/DL (ref 0–1)
BUN BLDV-MCNC: 14 MG/DL (ref 7–20)
CALCIUM SERPL-MCNC: 8.7 MG/DL (ref 8.3–10.6)
CHLORIDE BLD-SCNC: 107 MMOL/L (ref 99–110)
CO2: 22 MMOL/L (ref 21–32)
CREAT SERPL-MCNC: 0.5 MG/DL (ref 0.6–1.2)
EOSINOPHILS ABSOLUTE: 0.2 K/UL (ref 0–0.6)
EOSINOPHILS RELATIVE PERCENT: 2 %
GFR AFRICAN AMERICAN: >60
GFR NON-AFRICAN AMERICAN: >60
GLUCOSE BLD-MCNC: 135 MG/DL (ref 70–99)
HCT VFR BLD CALC: 23.6 % (ref 36–48)
HEMOGLOBIN: 7.8 G/DL (ref 12–16)
LYMPHOCYTES ABSOLUTE: 1.4 K/UL (ref 1–5.1)
LYMPHOCYTES RELATIVE PERCENT: 14 %
MCH RBC QN AUTO: 28.9 PG (ref 26–34)
MCHC RBC AUTO-ENTMCNC: 32.8 G/DL (ref 31–36)
MCV RBC AUTO: 87.9 FL (ref 80–100)
MONOCYTES ABSOLUTE: 0.4 K/UL (ref 0–1.3)
MONOCYTES RELATIVE PERCENT: 4 %
NEUTROPHILS ABSOLUTE: 7.8 K/UL (ref 1.7–7.7)
NEUTROPHILS RELATIVE PERCENT: 80 %
NUCLEATED RED BLOOD CELLS: 1 /100 WBC
PDW BLD-RTO: 15.1 % (ref 12.4–15.4)
PLATELET # BLD: 273 K/UL (ref 135–450)
PLATELET SLIDE REVIEW: ADEQUATE
PMV BLD AUTO: 8.9 FL (ref 5–10.5)
POTASSIUM SERPL-SCNC: 3.6 MMOL/L (ref 3.5–5.1)
RBC # BLD: 2.69 M/UL (ref 4–5.2)
RBC # BLD: NORMAL 10*6/UL
SLIDE REVIEW: ABNORMAL
SODIUM BLD-SCNC: 139 MMOL/L (ref 136–145)
TOTAL PROTEIN: 5.1 G/DL (ref 6.4–8.2)
WBC # BLD: 9.8 K/UL (ref 4–11)

## 2022-10-12 PROCEDURE — 2700000000 HC OXYGEN THERAPY PER DAY

## 2022-10-12 PROCEDURE — 94640 AIRWAY INHALATION TREATMENT: CPT

## 2022-10-12 PROCEDURE — 6370000000 HC RX 637 (ALT 250 FOR IP): Performed by: SURGERY

## 2022-10-12 PROCEDURE — 6360000002 HC RX W HCPCS: Performed by: SURGERY

## 2022-10-12 PROCEDURE — APPSS15 APP SPLIT SHARED TIME 0-15 MINUTES: Performed by: NURSE PRACTITIONER

## 2022-10-12 PROCEDURE — 99024 POSTOP FOLLOW-UP VISIT: CPT | Performed by: SURGERY

## 2022-10-12 PROCEDURE — 94669 MECHANICAL CHEST WALL OSCILL: CPT

## 2022-10-12 PROCEDURE — 36415 COLL VENOUS BLD VENIPUNCTURE: CPT

## 2022-10-12 PROCEDURE — 2580000003 HC RX 258: Performed by: SURGERY

## 2022-10-12 PROCEDURE — 6370000000 HC RX 637 (ALT 250 FOR IP): Performed by: INTERNAL MEDICINE

## 2022-10-12 PROCEDURE — APPNB30 APP NON BILLABLE TIME 0-30 MINS: Performed by: NURSE PRACTITIONER

## 2022-10-12 PROCEDURE — 6370000000 HC RX 637 (ALT 250 FOR IP): Performed by: NURSE PRACTITIONER

## 2022-10-12 PROCEDURE — 85025 COMPLETE CBC W/AUTO DIFF WBC: CPT

## 2022-10-12 PROCEDURE — 80053 COMPREHEN METABOLIC PANEL: CPT

## 2022-10-12 PROCEDURE — 94761 N-INVAS EAR/PLS OXIMETRY MLT: CPT

## 2022-10-12 RX ORDER — AMLODIPINE BESYLATE 10 MG/1
10 TABLET ORAL DAILY
Qty: 30 TABLET | Refills: 3 | Status: SHIPPED | OUTPATIENT
Start: 2022-10-13

## 2022-10-12 RX ADMIN — Medication 1 CAPSULE: at 09:13

## 2022-10-12 RX ADMIN — IPRATROPIUM BROMIDE AND ALBUTEROL SULFATE 1 AMPULE: 2.5; .5 SOLUTION RESPIRATORY (INHALATION) at 07:15

## 2022-10-12 RX ADMIN — Medication 10 ML: at 09:20

## 2022-10-12 RX ADMIN — METOPROLOL SUCCINATE 25 MG: 25 TABLET, EXTENDED RELEASE ORAL at 09:13

## 2022-10-12 RX ADMIN — ACETAMINOPHEN 650 MG: 325 TABLET ORAL at 13:15

## 2022-10-12 RX ADMIN — ACETAMINOPHEN 650 MG: 325 TABLET ORAL at 06:34

## 2022-10-12 RX ADMIN — ENOXAPARIN SODIUM 40 MG: 100 INJECTION SUBCUTANEOUS at 09:12

## 2022-10-12 RX ADMIN — PIPERACILLIN AND TAZOBACTAM 3375 MG: 3; .375 INJECTION, POWDER, FOR SOLUTION INTRAVENOUS at 09:28

## 2022-10-12 RX ADMIN — POLYETHYLENE GLYCOL 3350 17 G: 17 POWDER, FOR SOLUTION ORAL at 09:12

## 2022-10-12 RX ADMIN — DOCUSATE SODIUM 100 MG: 100 CAPSULE, LIQUID FILLED ORAL at 09:13

## 2022-10-12 RX ADMIN — AMLODIPINE BESYLATE 10 MG: 5 TABLET ORAL at 09:13

## 2022-10-12 RX ADMIN — PIPERACILLIN AND TAZOBACTAM 3375 MG: 3; .375 INJECTION, POWDER, FOR SOLUTION INTRAVENOUS at 01:22

## 2022-10-12 RX ADMIN — ACETAMINOPHEN 650 MG: 325 TABLET ORAL at 01:22

## 2022-10-12 RX ADMIN — ATORVASTATIN CALCIUM 10 MG: 10 TABLET, FILM COATED ORAL at 09:13

## 2022-10-12 ASSESSMENT — PAIN SCALES - GENERAL
PAINLEVEL_OUTOF10: 0

## 2022-10-12 ASSESSMENT — PAIN SCALES - WONG BAKER: WONGBAKER_NUMERICALRESPONSE: 0

## 2022-10-12 NOTE — CARE COORDINATION
Discharge Planning; Discharge noted , BERNY spoke with Colleen with [facility], stated able to accept patient today. Requested information faxed to [phone], Nurse report; [phone] and ask for Rehab Nurse. Transport scheduled with  time of 1900 via [address].  Patient and family updated and were in agreement. Alejandro Bernheim was updated of the ETA.

## 2022-10-12 NOTE — DISCHARGE INSTRUCTIONS
[facility]    Discharge Instructions      Activity  - activity as tolerated, no driving while on analgesics, and no heavy lifting for 6 weeks; it is OK to be up walking around--walking up and down stairs is also OK. Do what is comfortable: stop and rest if you feel tired. Diet  - regular diet  - Drink plenty of fluids     Pain  - You may use narcotic pain medication as prescribed for breakthrough pain  - You can use OTC Tylenol or Ibuprofen for 1-2 weeks (may need to adjust the dose as directed on the bottle if enteric coated ibuprofen chosen)  - Avoid taking narcotic pain medication on an empty stomach which may result in nausea, if pain medication is causing nausea try decreasing the dose  - Narcotic pain medication is not necessary, please contact the office if pain is not controlled  - Narcotic pain medication will not be refilled on weekends and after hours  - Please contact the office Monday-Friday 8a-4p if a refill is requested    Nausea  - Avoid taking narcotic pain medication on an empty stomach which may result in nausea  - If pain medication is causing nausea you may try decreasing the dose  - Nausea can be common for 24 hours after surgery--if nausea uncontrolled or worsening, contact the office or go to the ED    Constipation  - Pain medication can be constipating  - Often, it helps to take a stool softener with the goal of at least one soft bowel movement daily with minimal straining  - You may also need to increase water and fiber intake--may supplement with Benefiber and increasing your activity will also be helpful  - If a stool softener is needed, the following OTC medications are recommend: Colace 100 mg by mouth twice daily, Miralax 17 gm by mouth 1-3 times daily with glass of water, dulcolax suppositories, and Fleets enemas    Wound Care  - keep wound clean and dry  - If incisional bleeding is noted, hold firm pressure for 15-20 minutes.  If remains uncontrolled, either contact the office or present to nearest ED  - It is common to have bruising or mild redness around the wounds within the first few days after surgery. If it worsens, or starts draining, do not hesitate to contact the office  - May shower but no tub baths or swimming pools--do not submerge incisions under water    Cautions  - Watch for signs of infection, such as: fever over 100.5, excessive warmth or redness around incisions, bloody or cloudy drainage from incisions  - Watch for signs of complication: uncontrolled pain, nausea, bloating, sudden lightheadedness  - Serious problems can arise after surgery that need urgent or immediate care  - Do not hesitate to contact the office with any questions    Follow-up with your surgeon in  2 weeks. Call [phone] to schedule follow-up visit.

## 2022-10-12 NOTE — DISCHARGE INSTR - COC
Continuity of Care Form    Patient Name: Che Suarez   :  3/16/1931  MRN:  [id number]    Admit date:  10/4/2022  Discharge date:  10/12/22    Code Status Order: Full Code   Advance Directives:   Advance Care Flowsheet Documentation       Date/Time Healthcare Directive Type of Healthcare Directive Copy in [address] Agent's Name Healthcare Agent's Phone Number    10/07/22 1242 Yes, patient has an advance directive for healthcare treatment Living will -- -- -- --    10/06/22 1159 Yes, patient has an advance directive for healthcare treatment Durable power of  for health care -- Healthcare power of  [place] [phone]            Admitting Physician:  Scotty Wiggins MD  PCP: No primary care provider on file. Discharging Nurse: [facility] Unit/Room#: 8KN-6907/4414-94  Discharging Unit Phone Number: [phone]    Emergency Contact:   Extended Emergency Contact Information  Primary Emergency Contact: Brittni Handley  Home Phone: [phone]  Relation: Other    Past Surgical History:  Past Surgical History:   Procedure Laterality Date    CHOLECYSTECTOMY, LAPAROSCOPIC N/A 10/7/2022    LAPAROSCOPIC CHOLECYSTECTOMY WITH INTRAOPERATIVE CHOLANGIOGRAM performed by Ceferino Byrd MD at [facility]    ERCP N/A 10/6/2022    ERCP STONE REMOVAL performed by Frederick Phillips MD at [address] [facility] ENDOSCOPY       Immunization History: There is no immunization history on file for this patient.     Active Problems:  Patient Active Problem List   Diagnosis Code    Acute cholecystitis K81.0       Isolation/Infection:   Isolation            No Isolation          Patient Infection Status       None to display            Nurse Assessment:  Last Vital Signs: /62   Pulse 82   Temp 97.7 °F (36.5 °C) (Oral)   Resp 16   Ht 5' 7\" (1.702 m)   Wt 163 lb 8 oz (74.2 kg)   LMP 1962 (Within Years)   SpO2 96%   BMI 25.61 kg/m²     Last documented pain score (0-10 scale): Pain Level: 0  Last Weight:   Wt Readings from Last 1 Encounters:   10/12/22 163 lb 8 oz (74.2 kg)     Mental Status:  oriented and alert    IV Access:  - None    Nursing Mobility/ADLs:  Walking   Assisted with walker  Transfer  Assisted  Bathing  Assisted  Dressing  Assisted  [facility] 22 Delivery   whole    Wound Care Documentation and Therapy:  Incision 10/07/22 Abdomen Medial;Upper (Active)   Dressing/Treatment Surgical glue 10/12/22 0824   Closure Surgical glue; Open to air 10/12/22 0824   Margins Approximated 10/12/22 0824   Incision Assessment Dry 10/12/22 0824   Drainage Amount None 10/12/22 0824   Odor None 10/12/22 0824   Odalis-incision Assessment Ecchymosis 10/12/22 0824   Number of days: 4        Elimination:  Continence: Bowel: Yes  Bladder: Yes  Urinary Catheter: None   Colostomy/Ileostomy/Ileal Conduit: No       Date of Last BM: 10/10/22    Intake/Output Summary (Last 24 hours) at 10/12/2022 1114  Last data filed at 10/12/2022 0630  Gross per 24 hour   Intake --   Output 1500 ml   Net -1500 ml     I/O last 3 completed shifts: In: 60 [P.O.:60]  Out: 2200 [Urine:2200]    Safety Concerns: At Risk for Falls    Impairments/Disabilities:      Vision and Hearing    Nutrition Therapy:  Current Nutrition Therapy:   - Oral Diet:  General    Routes of Feeding: Oral  Liquids: Thin Liquids  Daily Fluid Restriction: no  Last Modified Barium Swallow with Video (Video Swallowing Test): not done    Treatments at the Time of Hospital Discharge:   Respiratory Treatments: N/A  Oxygen Therapy:  is on oxygen at 2 L/min per nasal cannula.   Ventilator:    - No ventilator support    Rehab Therapies: Physical Therapy and Occupational Therapy  Weight Bearing Status/Restrictions: No weight bearing restrictions  Other Medical Equipment (for information only, NOT a DME order):  cane  Other Treatments: N/A    Patient's personal belongings (please select all that are sent with patient):  None    RN SIGNATURE:  Electronically signed by Gerber Santana RN on 10/12/22 at 11:25 AM EDT    CASE MANAGEMENT/SOCIAL WORK SECTION    Inpatient Status Date:  10/4/22    Readmission Risk Assessment Score:  Readmission Risk              Risk of Unplanned Readmission:  13           Discharging to Facility/ Agency   Name:   [facility]   [place], [address] [place] [address]  Phone: [phone]  Fax: [phone]           Address:  Phone:  Fax:    Dialysis Facility (if applicable)   Name:  Address:  Dialysis Schedule:  Phone:  Fax:    / signature: Electronically signed by Leary Alpers, RN on 10/12/22 at 4:20 PM EDT    PHYSICIAN SECTION    Prognosis: Fair    Condition at Discharge: Stable    Rehab Potential (if transferring to Rehab): Fair    Recommended Labs or Other Treatments After Discharge:     Physician Certification: I certify the above information and transfer of Haritha Simmons  is necessary for the continuing treatment of the diagnosis listed and that she requires [facility] for greater 30 days.      Update Admission H&P: No change in H&P    PHYSICIAN SIGNATURE:  Electronically signed by Carl Elizondo MD on 10/12/22 at 11:15 AM EDT

## 2022-10-12 NOTE — PROGRESS NOTES
Physical Therapy Attempt Note  Attempted to see pt for PT tx however pt declining stating she is leaving tonight for SNF at 7pm and knows it will be a long night so she wants to rest now.  If pt does not dc will follow up with pt as schedule permits  [facility] PT [id number]

## 2022-10-12 NOTE — PROGRESS NOTES
[facility] and Laparoscopic [facility]        Progress Note    Patient Name: Gold Coello  MRN: [id number]  YOB: 1931  Date of Evaluation: 10/12/2022    Postoperative Day #5    Subjective:  No acute events overnight  Pain minimal, controlled without narcotics  No nausea or vomiting, tolerating regular diet--appetite improving  Passing some flatus and stool  Resting in bed at this time      Vital Signs:  Patient Vitals for the past 24 hrs:   BP Temp Temp src Pulse Resp SpO2 Weight   10/12/22 1211 -- -- -- 75 -- -- --   10/12/22 1145 (!) 132/58 97.7 °F (36.5 °C) Oral 71 16 95 % --   10/12/22 1028 -- -- -- 82 -- -- --   10/12/22 0830 134/62 97.7 °F (36.5 °C) Oral 81 16 96 % 163 lb 8 oz (74.2 kg)   10/12/22 0824 -- -- -- 77 -- -- --   10/12/22 0730 -- -- -- 56 -- -- --   10/12/22 0715 -- -- -- 72 16 97 % --   10/12/22 0430 (!) 146/67 97.8 °F (36.6 °C) Oral 71 16 94 % --   10/12/22 0015 (!) 156/75 98.1 °F (36.7 °C) Oral 56 14 94 % --   10/11/22 2047 -- -- -- 70 16 95 % --   10/11/22 2000 (!) 163/74 97.9 °F (36.6 °C) Oral 76 16 93 % --   10/11/22 1715 (!) 167/66 97.8 °F (36.6 °C) Oral 62 16 97 % --        TEMPERATURE HISTORY 24H: Temp (24hrs), Av.8 °F (36.6 °C), Min:97.7 °F (36.5 °C), Max:98.1 °F (36.7 °C)    BLOOD PRESSURE HISTORY: Systolic (25RTQ), SCW:722 , Min:127 , JNZ:185    Diastolic (09CPI), ESM:81, Min:48, Max:75      Intake/Output:  I/O last 3 completed shifts: In: 61 [P.O.:60]  Out: 2200 [Urine:2200]  No intake/output data recorded.   Drain/tube Output:       Physical Exam:  General: awake, alert, oriented to person, place, time  Lungs: unlabored respirations  Abdomen: soft, mildly distended, incisional tenderness only, bowel sounds present  Skin/Wound: healing well, no drainage, no erythema, ecchymosis noted, well approximated    Labs:  CBC:    Recent Labs     10/10/22  0635 10/10/22  1304 10/11/22  0538 10/12/22  0536   WBC 8.9  --  9.3 9.8   HGB 7.1* 7.5* 7.5* 7.8*   HCT 21.9* 23.3* 22.5* 23.6*     --  210 273       BMP:    Recent Labs     10/10/22  0635 10/11/22  0538 10/12/22  0536    137 139   K 3.9 3.8 3.6   * 109 107   CO2 20* 17* 22   BUN 38* 23* 14   CREATININE 1.2 0.6 0.5*   GLUCOSE 88 104* 135*       Hepatic:    Recent Labs     10/10/22  0635 10/11/22  0538 10/12/22  0536   AST 44* 31 27   * 101* 83*   BILITOT 1.0 1.2* 1.6*   ALKPHOS 151* 149* 162*       Amylase:  No results found for: AMYLASE  Lipase:    Lab Results   Component Value Date/Time    LIPASE 25.0 10/07/2022 05:52 AM    LIPASE 51.0 10/05/2022 06:07 AM    LIPASE 38.0 10/04/2022 08:09 PM        Mag:    Lab Results   Component Value Date/Time    MG 1.80 10/05/2022 06:07 AM     Phos:     Lab Results   Component Value Date/Time    PHOS 2.9 10/05/2022 06:07 AM      Coags:   Lab Results   Component Value Date/Time    PROTIME 16.2 10/06/2022 04:48 AM    INR 1.31 10/06/2022 04:48 AM    APTT 34.8 10/05/2022 06:07 AM       Cultures:  Anaerobic culture  No results found for: LABANAE  Fungus stain  No results found for requested labs within last 30 days. Gram stain  No results found for requested labs within last 30 days. Organism  No results found for: [facility]  Surgical culture  No results found for: CXSURG  Blood culture 1  No results found for requested labs within last 30 days. Blood culture 2  No results found for requested labs within last 30 days. Fecal occult  No results found for requested labs within last 30 days. GI bacterial pathogens by PCR  No results found for requested labs within last 30 days. C. difficile  No results found for requested labs within last 30 days. Urine culture  No results found for: LABURIN    Pathology:  OR 10/7/2022--FINAL DIAGNOSIS:     Gallbladder, cholecystectomy:   - Chronic cholecystitis with fibrosis. - Cholelithiasis.      Imaging:  I have personally reviewed the following films:    XR CHEST (2 VW)    Result Date: 10/11/2022  EXAMINATION: TWO XRAY VIEWS OF THE CHEST 10/11/2022 2:36 pm COMPARISON: 10/08/2022 HISTORY: ORDERING SYSTEM PROVIDED HISTORY: dypsnea TECHNOLOGIST PROVIDED HISTORY: Reason for exam:->dypsnea Reason for Exam: dypsnea FINDINGS: The heart is normal.  The pulmonary vessels are less prominent. There is some hazy right basilar opacity and small right pleural effusion which is less prominent. There is some mild left basilar opacity and a small left pleural effusion which is more apparent. The bones are intact. Resolving central pulmonary congestion Slowly resolving right basilar atelectasis or infiltrate and a resolving pleural effusion Mild left basilar atelectasis or developing infiltrate and a small left pleural effusion which is more apparent      Scheduled Meds:   docusate sodium  100 mg Oral BID    polyethylene glycol  17 g Oral Daily    ipratropium-albuterol  1 ampule Inhalation BID    sodium chloride flush  5-40 mL IntraVENous 2 times per day    enoxaparin  40 mg SubCUTAneous Daily    acetaminophen  650 mg Oral Q6H    amLODIPine  10 mg Oral Daily    [Held by provider] apixaban  5 mg Oral BID    sodium chloride flush  10 mL IntraVENous 2 times per day    lactobacillus  1 capsule Oral BID WC    metoprolol succinate  25 mg Oral Daily    atorvastatin  10 mg Oral Daily    piperacillin-tazobactam  3,375 mg IntraVENous Q8H     Continuous Infusions:   sodium chloride      sodium chloride 100 mL/hr at 10/11/22 2052     PRN Meds:.ipratropium-albuterol, sodium chloride flush, sodium chloride, ondansetron **OR** ondansetron, morphine **OR** morphine, oxyCODONE **OR** oxyCODONE, sodium chloride flush, sodium chloride, acetaminophen **OR** acetaminophen, senna, ondansetron, morphine **OR** morphine      Assessment:  80 y.o. female admitted with   1. Acute cholecystitis    2.  Transaminitis        OR Date 10/7/2022, laparoscopic cholecystectomy for acute cholecystitis  Procedure Date 10/6/2022, ERCP with stone removal for choledocholithiasis  Atrial fibrillation  CHF  Medical coagulopathy, on Eliquis--currently held  Acute kidney injury      Plan:  1. Pain controlled without narcotics, no nausea/vomiting, tolerating regular diet--appetite improving, passing flatus and stool, abdominal exam stable, vitals/labs stable--mild elevation in bilirubin, hemoglobin stable; continued supportive care  2. Regular diet with supplements as tolerated; monitor bowel function  3. IV hydration and electrolyte correction per nephrology  4. Antibiotics  5. Activity as tolerated, ambulate TID, up to chair for meals--PT/OT following  6. Pulmonary toilet, incentive spirometry--wean O2 as able  7. PRN analgesics and antiemetics--minimizing narcotics as tolerated, transition to PO, apply ICE  8. DVT prophylaxis with  Lovenox and SCD's; okay to resume Eliquis at discharge  9. Management of medical comorbid etiologies per primary team and consulting services  10. Disposition: Discharge when stable medically; follow up with Dr. Tatiana Jane in 2 weeks    EDUCATION:  Educated patient on plan of care and disease process--all questions answered. Plans discussed with patient and nursing. Reviewed and discussed with Dr. Tatiana Jane. Signed:  TIM Lewis - CNP  10/12/2022 1:19 PM    I have personally performed a face to face diagnostic evaluation on this patient. I have interviewed and examined the patient and I agree with the assessment above. Time was spent reviewing patient chart (including but not limited to notes, labs, imaging and other testing), interviewing and counseling patient and present family members, performing physical exam, documentation of my findings and subsequent follow up of ordered medication and testing, placing referrals and communication with patient care providers, coordinating future care as well as documentation in the EHR. This encompassed more than 50% of the total encounter time.  In summary, my findings and plan are the following:   Ms. Navarro Brennan is a 80 y.o. female who presents with   OR Date 10/7/2022, laparoscopic cholecystectomy for acute cholecystitis  Procedure Date 10/6/2022, ERCP with stone removal for choledocholithiasis  Atrial fibrillation  CHF  Medical coagulopathy, on Eliquis--currently held  Acute kidney injury    Plan:  1. Tolerating diet, encourage PO intake as nutrition critical for wound healing  2. Increase activity, pulmonary toilet, incentive spirometry, wean oxygen as able  3. Antibiotics, can stop at discharge from surgical standpoint  4. IVF, electrolytes per nephrology  5. Anemia, hemoglobin stable  6. LFT's reassuring, elevated bilirubin slightly likely from blood breakdown, asymptomatic  7. Pain control, transition to PO and minimize narcotics  8. Defer management of remainder of medical comorbidities to primary and consulting teams  9. Discharge planning, possible discharge to ECF today, may discharge from surgical standpoint and follow in office 2 weeks    Ernesto Allen MD, FACS  10/12/2022  2:24 PM

## 2022-10-12 NOTE — PROGRESS NOTES
Report called to [facility] at this time. All questions answered. Callback number provided to staff. Pickup time scheduled for 1900.

## 2022-10-12 NOTE — DISCHARGE SUMMARY
Hospital Medicine Discharge Summary    Patient: Haritha Simmons     Gender: female  : 3/16/1931   Age: 80 y.o. MRN: [id number]    Admitting Physician: Jayesh Boland MD  Discharge Physician: Carl Elizondo MD     Code Status: Full Code     Admit Date: 10/4/2022   Discharge Date:   10/12/022    Disposition:  Home  Time spent arranging discharge: 35 minutes    Discharge Diagnoses: Active Hospital Problems    Diagnosis Date Noted    Acute cholecystitis [K81.0] 10/04/2022     Priority: Medium         Condition at Discharge:  Stable    Hospital Course:   Mid to hospital acute cholecystitis underwent cholecystectomy developed hypoxia after cholecystectomy. Patient was given incentive spirometry and stable on 2 L oxygen hopefully able to wean off oxygen with increased movement patient had JOHN resolved with IV fluids. Patient did have anemia post op which has been stable. Discharge Exam:    /62   Pulse 82   Temp 97.7 °F (36.5 °C) (Oral)   Resp 16   Ht 5' 7\" (1.702 m)   Wt 163 lb 8 oz (74.2 kg)   LMP 1962 (Within Years)   SpO2 96%   BMI 25.61 kg/m²   General appearance:  Appears comfortable. AAOx3  HEENT: atraumatic, Pupils equal, muscous membranes moist, no masses appreciated  Cardiovascular: Regular rate and rhythm no murmurs appreciated  Respiratory: CTAB no wheezing  Gastrointestinal: Abdomen soft, non-tender, BS+  EXT: no edema  Neurology: no gross focal deficts  Psychiatry: Appropriate affect. Not agitated  Skin: Warm, dry, no rashes appreciated    Discharge Medications:   Current Discharge Medication List        START taking these medications    Details   oxyCODONE-acetaminophen (PERCOCET) 5-325 MG per tablet Take 1 tablet by mouth every 6 hours as needed for Pain for up to 7 days. Take lowest dose possible to manage pain; may stop taking sooner than 7 days if pain is able to be controlled with non-narcotic analgesics and therapies.   Qty: 10 tablet, Refills: 0    Comments: Reduce doses taken as pain becomes manageable  Associated Diagnoses: Acute cholecystitis           Current Discharge Medication List        CONTINUE these medications which have CHANGED    Details   amLODIPine (NORVASC) 10 MG tablet Take 1 tablet by mouth daily  Qty: 30 tablet, Refills: 3           Current Discharge Medication List        CONTINUE these medications which have NOT CHANGED    Details   Apixaban (ELIQUIS PO) Take 5 mg by mouth in the morning and at bedtime      atorvastatin (LIPITOR) 10 MG tablet Take 1 tablet by mouth at bedtime      metoprolol succinate (TOPROL XL) 25 MG extended release tablet Take 1 tablet by mouth daily           Current Discharge Medication List        STOP taking these medications       lisinopril (PRINIVIL;ZESTRIL) 20 MG tablet Comments:   Reason for Stopping:               Labs:  For convenience and continuity at follow-up the following most recent labs are provided:    Lab Results   Component Value Date/Time    WBC 9.8 10/12/2022 05:36 AM    HGB 7.8 10/12/2022 05:36 AM    HCT 23.6 10/12/2022 05:36 AM    MCV 87.9 10/12/2022 05:36 AM     10/12/2022 05:36 AM     10/12/2022 05:36 AM    K 3.6 10/12/2022 05:36 AM     10/12/2022 05:36 AM    CO2 22 10/12/2022 05:36 AM    BUN 14 10/12/2022 05:36 AM    CREATININE 0.5 10/12/2022 05:36 AM    CALCIUM 8.7 10/12/2022 05:36 AM    PHOS 2.9 10/05/2022 06:07 AM    ALKPHOS 162 10/12/2022 05:36 AM    ALT 83 10/12/2022 05:36 AM    AST 27 10/12/2022 05:36 AM    BILITOT 1.6 10/12/2022 05:36 AM    BILIDIR 0.9 10/08/2022 07:13 AM    LABALBU 2.6 10/12/2022 05:36 AM     Lab Results   Component Value Date    INR 1.31 (H) 10/06/2022    INR 1.57 (H) 10/05/2022    INR 1.59 (H) 10/04/2022       Radiology:  XR CHEST (2 VW)    Result Date: 10/11/2022  EXAMINATION: TWO XRAY VIEWS OF THE CHEST 10/11/2022 2:36 pm COMPARISON: 10/08/2022 HISTORY: ORDERING SYSTEM PROVIDED HISTORY: dypsnea TECHNOLOGIST PROVIDED HISTORY: Reason for exam:->dypsnea Reason for Exam: dypsnea FINDINGS: The heart is normal.  The pulmonary vessels are less prominent. There is some hazy right basilar opacity and small right pleural effusion which is less prominent. There is some mild left basilar opacity and a small left pleural effusion which is more apparent. The bones are intact. Resolving central pulmonary congestion Slowly resolving right basilar atelectasis or infiltrate and a resolving pleural effusion Mild left basilar atelectasis or developing infiltrate and a small left pleural effusion which is more apparent     CT ABDOMEN PELVIS W IV CONTRAST Additional Contrast? None    Result Date: 10/5/2022  EXAMINATION: CT OF THE ABDOMEN AND PELVIS WITH CONTRAST 10/5/2022 12:02 am TECHNIQUE: CT of the abdomen and pelvis was performed with the administration of intravenous contrast. Multiplanar reformatted images are provided for review. Automated exposure control, iterative reconstruction, and/or weight based adjustment of the mA/kV was utilized to reduce the radiation dose to as low as reasonably achievable. COMPARISON: Right upper quadrant ultrasound 10/04/2022 HISTORY: ORDERING SYSTEM PROVIDED HISTORY: abdomina pain, abnormal ultrasound TECHNOLOGIST PROVIDED HISTORY: Reason for exam:->abdomina pain, abnormal ultrasound Additional Contrast?->None Decision Support Exception - unselect if not a suspected or confirmed emergency medical condition->Emergency Medical Condition (MA) Reason for Exam: abdomina pain, abnormal ultrasound Relevant Medical/Surgical History: Abnormal CT (Abd pain and had a ct scan today showed gallbladder issues. Had a ekg, blood work, chest x ray) FINDINGS: Lower Chest: Mild chronic atelectasis in the middle. Lung bases are otherwise clear. There is a lower lobe calcified granuloma. The heart size is normal.  There is three-vessel calcific coronary artery disease. Organs: There are faint gallstones.   The gallbladder wall is diffusely thickened and there is pericholecystic fat stranding and trace fluid. There is mild intrahepatic bile dilatation and mild to moderate common bile duct dilatation extending to the ampulla. No definite evidence of choledocholithiasis although the gallstones noted are only faintly radiopaque. The liver, spleen, pancreas, adrenal glands and kidneys are normal. Bilateral renal calcifications likely represent renal artery calcifications. There are a few bilateral renal cortical and parapelvic cysts. No hydronephrosis. GI/Bowel: The appendix is normal.  Sigmoid colon diverticulosis with no evidence of diverticulitis. No bowel obstruction. Pelvis: Urinary bladder was not well distended but appeared grossly normal. There are calcified uterine fibroids. There is a 2.5 cm left ovarian cyst. Peritoneum/Retroperitoneum: There is no adenopathy, free air or free fluid. Calcific aorto iliac atherosclerotic disease with no abdominal aortic aneurysm. Bones/Soft Tissues: No acute bone finding. Cholelithiasis. Gallbladder wall thickening, pericholecystic fat stranding and trace fluid suggest acute cholecystitis. Mild to moderate common bile duct dilatation extending to the ampulla. Choledocholithiasis must be considered. Follow-up MRCP would be helpful in further evaluation. Sigmoid colon diverticulosis. No evidence of diverticulitis. 2.5 cm left ovarian cyst.  Follow-up recommendation as below. RECOMMENDATIONS: Managing Incidental Adnexal Cystic Mass by CT or MR Late postmenopausal < or equal to 3 cm: no follow up >3 cm: US promptly Reference: Bharathi Boyer al. Managing Incidental Findings on Abdominal CT: White Paper of the ACR Incidental Findings Committee.  J Am Lo Radiol 2010;7:754-773     FL ERCP BILIARY AND PANCREATIC S&I    Result Date: 10/6/2022  EXAMINATION: 6 SPOT IMAGES FROM AN ERCP COMPARISON: None FLUOROSCOPY DOSE OR TIME/IMAGES: 2 minutes 24 seconds fluoroscopy 6 spot films HISTORY: ORDERING SYSTEM PROVIDED HISTORY: in ENDO TECHNOLOGIST PROVIDED HISTORY: Reason for exam:->in ENDO Reason for Exam:  ERCP BILIARY AND PANCREATIC FINDINGS: Endoscopy and cannulation of the major papilla was performed by the gastroenterology service. Spot views are presented for interpretation. Contrast is injected in bile ducts. Following this, balloon sweep is performed     ERCP images as above. .  Please refer to the procedure report for further details. US GALLBLADDER RUQ    Result Date: 10/4/2022  EXAMINATION: RIGHT UPPER QUADRANT ULTRASOUND 10/4/2022 9:36 pm COMPARISON: None. HISTORY: ORDERING SYSTEM PROVIDED HISTORY: RUQ PAIN TECHNOLOGIST PROVIDED HISTORY: Reason for exam:->RUQ PAIN FINDINGS: LIVER:  The liver demonstrates normal echogenicity without evidence of intrahepatic biliary ductal dilatation. BILIARY SYSTEM:  Stones are noted within the gallbladder. Negative sonographic Portillo's sign per the ultrasound technologist.  Gallbladder wall thickening is seen. In the region of the gallbladder fundus, there is non dependent echogenic masslike structure seen with posterior acoustic shadowing. Common bile duct is within normal limits measuring 11.2 mm. RIGHT KIDNEY: There are 2 separate cystic structures associated with the right kidney though no definite solid mass. There is also questionable hydronephrosis. Renal length as measured is 8.9 cm, though this may be technique related. PANCREAS:  Visualized portions of the pancreas are unremarkable. OTHER: No evidence of right upper quadrant ascites. 1. Cholelithiasis is identified, with minimal pericholecystic fluid. There is also a focal area of masslike thickening and increased echogenicity as well as posterior q.6 shadowing in a non dependent location involving the fundus of the gallbladder. A gallbladder mass cannot be excluded. Recommend further evaluation with dedicated CT imaging of the abdomen and pelvis with contrast. 2. Dilation of the common bile duct.   This can be assessed on CT imaging as well. 3. There are cysts associated with the right kidney, as well as questionable hydronephrosis. This can be assessed on CT imaging. XR CHEST PORTABLE    Result Date: 10/8/2022  EXAMINATION: ONE XRAY VIEW OF THE CHEST 10/8/2022 12:50 pm COMPARISON: None. HISTORY: ORDERING SYSTEM PROVIDED HISTORY: shortness of breath TECHNOLOGIST PROVIDED HISTORY: Reason for exam:->shortness of breath Reason for exam:->hypoxia Reason for Exam: sob FINDINGS: The cardiac silhouette is normal.  There is a moderate-sized right pleural effusion with basilar atelectasis. There is mild left basilar atelectasis. No pneumothorax. Moderate-sized right basilar pleural effusion with atelectasis. Mild left basilar atelectasis. MRI ABDOMEN W WO CONTRAST MRCP    Result Date: 10/5/2022  EXAMINATION: MRI OF THE ABDOMEN WITH AND WITHOUT CONTRAST AND MRCP 10/5/2022 11:54 am TECHNIQUE: Multiplanar multisequence MRI of the abdomen was performed with and without the administration of intravenous contrast.  After initial T2 axial and coronal images, thick slab, thin slab and 3D coronal MRCP sequences were obtained without the administration of intravenous contrast.  MIP images are provided for review. COMPARISON: Recent CT. Recent ultrasound HISTORY: ORDERING SYSTEM PROVIDED HISTORY: obstructive jaundice. possible GB cancer per prior imaging. TECHNOLOGIST PROVIDED HISTORY: Reason for exam:->obstructive jaundice. possible GB cancer per prior imaging. Reason for Exam: possible GB caner per prior imaging FINDINGS: Motion artifact degrades the study. This decreases sensitivity and specificity trace pleural fluid is seen. Gallbladder: Gallbladder is contracted. Multiple gallstones are seen. . There is trace pericholecystic fluid. There is gallbladder wall thickening. A definite gallbladder mass is not seen as suggested on prior ultrasound images Bile Ducts: Extrahepatic common duct measures 10 mm.   Small filling defect is seen in distal common duct measuring 3 mm., On coronal T2 weighted images. This is however not seen on MIP images. Pancreatic Duct: No pancreatic ductal dilatation Right adrenal gland is normal.  Left adrenal gland is normal. No hydronephrosis on right. No hydronephrosis on left T2 hyperintense foci seen in the right and left kidney, measuring 1.8 cm in size or less, likely cyst. No retroperitoneal adenopathy. Atherosclerotic change is seen in aorta No significant small or large bowel distention noted. Scattered colonic diverticula are seen Other:     Findings suggestive of acute cholecystitis. No suspicious gallbladder mass seen as suggested on prior ultrasound. Multiple gallstones are seen. There is a small filling defect seen in the distal common duct, either small amount of sludge or tiny stone.          Signed:    Gloria Seip, MD   10/12/2022 none

## 2022-10-12 NOTE — CARE COORDINATION
Discharge Plan:   Patient discharged to: [facility]   [place], [address]  Phone: [phone] - ask for Rehab Nurse  Fax: [phone]    SW/DC Planner faxed, [address] and AVS   Narcotic Prescriptions faxed were:  RN: Kimber Zavala will call report       Medical Transport with: [facility] [phone]  [facility] 1900  Family advised of discharge?:  yes- Christina 21?:  [facility], [facility] ID  [id number]    All discharge needs met per case management.

## 2022-10-12 NOTE — PROGRESS NOTES
Lap cholecystectomy 10/7/22.  4 abdominal scope sites with dermabond, ERNIE, no redness, edema, or drainage noted, well approximated. No dc orders at present. PT/OT recommend SNF placement. Up to BR with joelle UOP. Zosyn IV q8h. O2 @ 2L NC. Sinus kyle on monitor.

## 2022-10-12 NOTE — PROGRESS NOTES
[facility]   Nephrology progress Note  [phone]   [facility]           Reason for Consult:  JOHN , date of admission 10/4/2022  The patient is a 80 y.o. female with significant past medical history of hypertension, diastolic CHF, atrial fibrillation, gallstones removed by ERCP on 10/6/2022 followed by laparoscopic cholecystectomy on 10/7/2022,      She has no history of kidney disease kidney stone disease. Her serum creatinine on 10/8/2022 was 1 with a bun of 31 today the serum creatinine was elevated at 1.9 with a bun of 46 bicarb is 19. She is on antibiotic Zosyn, she is not on a ACE inhibitor/ARB or NSAID  Per records and nurse she has not had NSAIDs recently. Prior to admission she is appropriately on ACE inhibitor lisinopril for her blood pressure  There is been no hypotension  She has been on IV fluids,  oral intake has been poor  She did have a CT scan with IV contrast on 10/5/2022, the renal function decline started between 10/7 and 10/8/2022    Interval  History:    10/10/22: Discussed her care with her daughter. Both the patient and the daughter says she has never had congestive heart failure. She had hypertension for many years and this year was the first time she developed atrial fibrillation  Has no dyspnea no cough no short chest pain  10/11/22: feels better, chest X ray ordered  10/12/22: Lying in bed , daughter in the room  Has a non productive cough    PHYSICAL EXAM:    Vitals:    /62   Pulse 81   Temp 97.7 °F (36.5 °C) (Oral)   Resp 16   Ht 5' 7\" (1.702 m)   Wt 163 lb 8 oz (74.2 kg)   LMP 09/01/1962 (Within Years)   SpO2 96%   BMI 25.61 kg/m²   I/O last 3 completed shifts: In: 61 [P.O.:60]  Out: 2200 [Urine:2200]  No intake/output data recorded. Physical Exam:  Gen:  alert, oriented x 3, frail   PERRL , EOM +  Pallor +, No icterus  JVP not raised   CV: RRR no murmur or rub .   Lungs:B/ L air entry, Normal breath sounds , no dullness to percussion either bases  Abd: soft, bowel sounds + , No organomegaly , post op ( laproscopic )  Ext: No edema, no cyanosis  Skin: Warm.   No rash  Neuro: no focal defiict      DATA:    CBC with Differential:    Lab Results   Component Value Date/Time    WBC 9.8 10/12/2022 05:36 AM    RBC 2.69 10/12/2022 05:36 AM    HGB 7.8 10/12/2022 05:36 AM    HCT 23.6 10/12/2022 05:36 AM     10/12/2022 05:36 AM    MCV 87.9 10/12/2022 05:36 AM    MCH 28.9 10/12/2022 05:36 AM    MCHC 32.8 10/12/2022 05:36 AM    RDW 15.1 10/12/2022 05:36 AM    NRBC 1 10/12/2022 05:36 AM    LYMPHOPCT 14.0 10/12/2022 05:36 AM    MONOPCT 4.0 10/12/2022 05:36 AM    BASOPCT 0.0 10/12/2022 05:36 AM    MONOSABS 0.4 10/12/2022 05:36 AM    LYMPHSABS 1.4 10/12/2022 05:36 AM    EOSABS 0.2 10/12/2022 05:36 AM    BASOSABS 0.0 10/12/2022 05:36 AM     CMP:    Lab Results   Component Value Date/Time     10/12/2022 05:36 AM    K 3.6 10/12/2022 05:36 AM     10/12/2022 05:36 AM    CO2 22 10/12/2022 05:36 AM    BUN 14 10/12/2022 05:36 AM    CREATININE 0.5 10/12/2022 05:36 AM    GFRAA >60 10/12/2022 05:36 AM    AGRATIO 1.0 10/12/2022 05:36 AM    LABGLOM >60 10/12/2022 05:36 AM    GLUCOSE 135 10/12/2022 05:36 AM    PROT 5.1 10/12/2022 05:36 AM    LABALBU 2.6 10/12/2022 05:36 AM    CALCIUM 8.7 10/12/2022 05:36 AM    BILITOT 1.6 10/12/2022 05:36 AM    ALKPHOS 162 10/12/2022 05:36 AM    AST 27 10/12/2022 05:36 AM    ALT 83 10/12/2022 05:36 AM     Magnesium:    Lab Results   Component Value Date/Time    MG 1.80 10/05/2022 06:07 AM     Phosphorus:    Lab Results   Component Value Date/Time    PHOS 2.9 10/05/2022 06:07 AM     Uric Acid:  No results found for: LABURIC, URICACID  Last 3 Troponin:  No results found for: TROPONINI  U/A:    Lab Results   Component Value Date/Time    COLORU DARK YELLOW 10/10/2022 12:05 AM    PROTEINU TRACE 10/10/2022 12:05 AM    PHUR 5.0 10/10/2022 12:05 AM    WBCUA 7 10/10/2022 12:05 AM    RBCUA 1 10/10/2022 12:05 AM

## 2022-10-12 NOTE — PROGRESS NOTES
Data- discharge order received, pt or and niece (appointed legal authority) verbalized agreement to discharge, disposition to [address] reviewed and signed by physician. Action- AVS prepared, JENNIFER completed/ reported faxed by case management/. Discharge instruction summary: Diet- regular, Activity- up with cane, immunizations reviewed, medications prescriptions to be filled at receiving facility except for the controlled prescriptions to be sent: via paper script, Transfer code status: Full Code, LDAs to remain with discharge: N/A.   DME used: cane. Response- Bedside RN to call report to receiving facility. Pt belongings gathered, peripheral IV and cardiac monitoring removed. Disposition to Discharged via cart/stretcher and via ambulance to skilled nursing by EMS transportation, no complications reported. 1. WEIGHT: Admit Weight: 148 lb 2.4 oz (67.2 kg) (10/05/22 0133)        Today  Weight: 163 lb 8 oz (74.2 kg) (10/12/22 0830)       2.  O2 SAT.: SpO2: 94 % (10/12/22 1622)

## 2023-01-10 ENCOUNTER — OFFICE VISIT (OUTPATIENT)
Dept: SURGERY | Age: 88
End: 2023-01-10

## 2023-01-10 VITALS — DIASTOLIC BLOOD PRESSURE: 64 MMHG | SYSTOLIC BLOOD PRESSURE: 118 MMHG | WEIGHT: 142 LBS | BODY MASS INDEX: 22.24 KG/M2

## 2023-01-10 DIAGNOSIS — Z09 SURGERY FOLLOW-UP: Primary | ICD-10-CM

## 2023-01-10 PROCEDURE — 99024 POSTOP FOLLOW-UP VISIT: CPT | Performed by: SURGERY

## 2023-01-10 NOTE — PROGRESS NOTES
[facility]  SUBJECTIVE:    Zari Seth   3/16/1931   80 y.o. female presents for routine postoperative followup after   OR Date 10/7/2022, laparoscopic cholecystectomy for acute cholecystitis  Procedure Date 10/6/2022, ERCP with stone removal for choledocholithiasis. Delayed postoperative visit as patient had COVID and prolonged viral illness. Now is feeling well, tolerated diet, bowels normal, ambulating with improvement and is without complaints    Past Medical History:   Diagnosis Date    Atrial fibrillation (HCC)     Diastolic CHF ([facility] [address]     Hypertension      Past Surgical History:   Procedure Laterality Date    CHOLECYSTECTOMY, LAPAROSCOPIC N/A 10/7/2022    LAPAROSCOPIC CHOLECYSTECTOMY WITH INTRAOPERATIVE CHOLANGIOGRAM performed by Winnie Gillette MD at [address]    ERCP N/A 10/6/2022    ERCP STONE REMOVAL performed by Amanda Ramon MD at [address] History     Socioeconomic History    Marital status: Unknown     Spouse name: Not on file    Number of children: Not on file    Years of education: Not on file    Highest education level: Not on file   Occupational History    Not on file   Tobacco Use    Smoking status: Never    Smokeless tobacco: Not on file   Substance and Sexual Activity    Alcohol use: Not Currently    Drug use: Never    Sexual activity: Not on file   Other Topics Concern    Not on file   Social History Narrative    Not on file     Social Determinants of Health     Financial Resource Strain: Not on file   Food Insecurity: Not on file   Transportation Needs: Not on file   Physical Activity: Not on file   Stress: Not on file   Social Connections: Not on file   Intimate Partner Violence: Not on file   Housing Stability: Not on file      No family history on file.   Current Outpatient Medications   Medication Sig Dispense Refill    amLODIPine (NORVASC) 10 MG tablet Take 1 tablet by mouth daily 30 tablet 3    atorvastatin (LIPITOR) 10 MG tablet Take 1 tablet by mouth at bedtime      metoprolol succinate (TOPROL XL) 25 MG extended release tablet Take 1 tablet by mouth daily      Apixaban (ELIQUIS PO) Take 5 mg by mouth in the morning and at bedtime       No current facility-administered medications for this visit. No Known Allergies     Review of Systems:  Review of systems performed and negative with the exception of the above findings    OBJECTIVE:  /64   Wt 142 lb (64.4 kg)   LMP 09/01/1962 (Within Years)   BMI 22.24 kg/m²      Physical Exam:  General appearance: alert, appears stated age, cooperative, and no distress  Abdomen: soft, non-distended, appropriate incisional tenderness, incisions clean dry and intact    No visits with results within 6 Week(s) from this visit. Latest known visit with results is:   No results displayed because visit has over 200 results. Assessment:  OR Date 10/7/2022, laparoscopic cholecystectomy for acute cholecystitis  Procedure Date 10/6/2022, ERCP with stone removal for choledocholithiasis    Plan:  No restrictions with regard to activity or diet   Follow up with general surgery office as needed    [address] Cramer MD, FACS  1/10/2023  1:29 PM

## (undated) DEVICE — SINGLE USE DISTAL COVER MAJ-2315: Brand: SINGLE USE DISTAL COVER

## (undated) DEVICE — BLOCK BITE 48FR DENT RIM CAREGUARD

## (undated) DEVICE — HYPODERMIC SAFETY NEEDLE: Brand: MAGELLAN

## (undated) DEVICE — TROCARS: Brand: KII® BALLOON BLUNT TIP SYSTEM

## (undated) DEVICE — LAPAROSCOPIC SCISSORS: Brand: EPIX LAPAROSCOPIC SCISSORS

## (undated) DEVICE — TISSUE RETRIEVAL SYSTEM: Brand: INZII RETRIEVAL SYSTEM

## (undated) DEVICE — BAG U-BAG PLASTIC 10OZ MICROPORE ADHES

## (undated) DEVICE — TROCAR: Brand: KII® SLEEVE

## (undated) DEVICE — ENDOSCOPIC KIT 6X3/16 FT COLON W/ 1.1 OZ 2 GWN W/O BRSH

## (undated) DEVICE — APPLIER CLP M/L SHFT DIA5MM 15 LIG LIGAMAX 5

## (undated) DEVICE — PMI DISPOSABLE PUNCTURE CLOSURE DEVICE / SUTURE GRASPER: Brand: PMI

## (undated) DEVICE — SYRINGE MED 30ML STD CLR PLAS LUERLOCK TIP N CTRL DISP

## (undated) DEVICE — AIR/WATER CLEANING ADAPTER FOR OLYMPUS® GI ENDOSCOPE: Brand: BULLDOG®

## (undated) DEVICE — GOWN,AURORA,NONREINF,RAGLAN,XXL,STERILE: Brand: MEDLINE

## (undated) DEVICE — SHEET,DRAPE,53X77,STERILE: Brand: MEDLINE

## (undated) DEVICE — BW-412T DISP COMBO CLEANING BRUSH: Brand: SINGLE USE COMBINATION CLEANING BRUSH

## (undated) DEVICE — LAPAROSCOPY PACK: Brand: MEDLINE INDUSTRIES, INC.

## (undated) DEVICE — MERCY FAIRFIELD TURNOVER KIT: Brand: MEDLINE INDUSTRIES, INC.

## (undated) DEVICE — SYRINGE MED 50ML LUERLOCK TIP

## (undated) DEVICE — RELOAD STPL H1-2.5X45MM VASC THN TISS WHT 6 ROW B FRM SGL

## (undated) DEVICE — DRAPE,LAP,CHOLE,W/TROUGHS,STERILE: Brand: MEDLINE

## (undated) DEVICE — CUTTER ENDOSCP L340MM LIN ARTC SGL STROKE FIRING ENDOPATH

## (undated) DEVICE — COVER LT HNDL BLU PLAS

## (undated) DEVICE — SYRINGE, LUER LOCK, 10ML: Brand: MEDLINE

## (undated) DEVICE — INDICATED FOR USE DURING OPEN AND LAPAROSCOPIC CHOLECYSTECTOMY PROCEDURES TO INJECT RADIOPAQUE MEDIA THROUGH THE CYSTIC DUCT INTO THE BILIARY TREE.: Brand: AEROSTAT®

## (undated) DEVICE — CONTAINER DENT 8OZ AQUA W/ LID

## (undated) DEVICE — RETRIEVAL BALLOON CATHETER: Brand: EXTRACTOR™ PRO XL

## (undated) DEVICE — TROCAR: Brand: KII FIOS FIRST ENTRY

## (undated) DEVICE — SOLUTION IV IRRIG WATER 500ML POUR BRL ST 2F7113

## (undated) DEVICE — SUTURE VCRL + SZ 0 L27IN ABSRB VLT L26MM UR-6 5/8 CIR VCP603H

## (undated) DEVICE — POSITIONER HD W8XH4XL8.5IN RASPBERRY FOAM SLT

## (undated) DEVICE — ADHESIVE SKIN CLSR 0.7ML TOP DERMBND ADV

## (undated) DEVICE — PENCIL ES L3M BTTN SWCH S STL HEX LOK BLDE ELECTRD HOLSTER

## (undated) DEVICE — STERILE POLYISOPRENE POWDER-FREE SURGICAL GLOVES: Brand: PROTEXIS

## (undated) DEVICE — CORD ES L10FT MPLR LAP

## (undated) DEVICE — CANNULATING SPHINCTEROTOME: Brand: TRUETOME DREAMWIRE 44

## (undated) DEVICE — SUTURE MCRYL + SZ 4-0 L27IN ABSRB UD L19MM PS-2 3/8 CIR MCP426H

## (undated) DEVICE — SUTURE PDS + SZ 0 L36IN ABSRB VLT CT 1 L36MM 1 2 CIR PDP346H

## (undated) DEVICE — GLOVE SURG SZ 8 L12IN FNGR THK79MIL GRN LTX FREE

## (undated) DEVICE — APPLICATOR MEDICATED 26 CC SOLUTION HI LT ORNG CHLORAPREP

## (undated) DEVICE — ELECTRODE PT RET AD L9FT HI MOIST COND ADH HYDRGEL CORDED

## (undated) DEVICE — TROCAR: Brand: KII OPTICAL ACCESS SYSTEM

## (undated) DEVICE — SURGICEL ENDOSCP APPL

## (undated) DEVICE — C-ARM: Brand: UNBRANDED

## (undated) DEVICE — Device: Brand: SINGLE USE SOFT BRUSH

## (undated) DEVICE — SYRINGE MED 10ML TRNSLUC BRL PLUNG BLK MRK POLYPR CTRL

## (undated) DEVICE — Device

## (undated) DEVICE — VALVE SUCTION AIR H2O SET ORCA POD + DISP